# Patient Record
Sex: MALE | Race: WHITE | NOT HISPANIC OR LATINO | Employment: OTHER | URBAN - NONMETROPOLITAN AREA
[De-identification: names, ages, dates, MRNs, and addresses within clinical notes are randomized per-mention and may not be internally consistent; named-entity substitution may affect disease eponyms.]

---

## 2018-07-15 ENCOUNTER — HOSPITAL ENCOUNTER (EMERGENCY)
Facility: HOSPITAL | Age: 43
Discharge: HOME | End: 2018-07-15
Attending: STUDENT IN AN ORGANIZED HEALTH CARE EDUCATION/TRAINING PROGRAM | Admitting: STUDENT IN AN ORGANIZED HEALTH CARE EDUCATION/TRAINING PROGRAM

## 2018-07-15 VITALS
SYSTOLIC BLOOD PRESSURE: 122 MMHG | OXYGEN SATURATION: 100 % | RESPIRATION RATE: 18 BRPM | DIASTOLIC BLOOD PRESSURE: 66 MMHG | WEIGHT: 160.8 LBS | BODY MASS INDEX: 25.84 KG/M2 | TEMPERATURE: 98.7 F | HEIGHT: 66 IN | HEART RATE: 66 BPM

## 2018-07-15 DIAGNOSIS — M70.22 OLECRANON BURSITIS OF LEFT ELBOW: Primary | ICD-10-CM

## 2018-07-15 PROCEDURE — 99283 EMERGENCY DEPT VISIT LOW MDM: CPT

## 2018-07-15 RX ORDER — MELOXICAM 15 MG/1
15 TABLET ORAL DAILY
Qty: 20 TABLET | Refills: 0 | Status: SHIPPED | OUTPATIENT
Start: 2018-07-15 | End: 2019-10-19 | Stop reason: HOSPADM

## 2018-07-15 RX ORDER — CLINDAMYCIN HYDROCHLORIDE 300 MG/1
300 CAPSULE ORAL 4 TIMES DAILY
Qty: 40 CAPSULE | Refills: 0 | Status: SHIPPED | OUTPATIENT
Start: 2018-07-15 | End: 2019-10-19 | Stop reason: SDUPTHER

## 2018-07-15 RX ORDER — MELOXICAM 7.5 MG/1
15 TABLET ORAL DAILY
Status: DISCONTINUED | OUTPATIENT
Start: 2018-07-15 | End: 2018-07-15

## 2018-07-15 RX ORDER — MELOXICAM 7.5 MG/1
15 TABLET ORAL DAILY
Status: DISCONTINUED | OUTPATIENT
Start: 2018-07-15 | End: 2018-07-15 | Stop reason: HOSPADM

## 2018-07-15 RX ORDER — CLINDAMYCIN HYDROCHLORIDE 150 MG/1
600 CAPSULE ORAL ONCE
Status: COMPLETED | OUTPATIENT
Start: 2018-07-15 | End: 2018-07-15

## 2018-07-15 RX ADMIN — CLINDAMYCIN HYDROCHLORIDE 600 MG: 150 CAPSULE ORAL at 03:47

## 2018-07-15 RX ADMIN — MELOXICAM 15 MG: 7.5 TABLET ORAL at 03:47

## 2018-07-15 NOTE — ED PROVIDER NOTES
Subjective   42-year-old male that presents with 2 days of progressively worsening left elbow pain.  States it has redness and swelling since yesterday morning.  He is concerned it was bitten by some form of bug.  Pain is worse with straightening the arm.  Pain is severe, constant, aching.            Review of Systems   All other systems reviewed and are negative.      Past Medical History:   Diagnosis Date   • Lung disease        Allergies   Allergen Reactions   • Ultram [Tramadol Hcl] Hives       Past Surgical History:   Procedure Laterality Date   • LEG SURGERY      broken bone   • NECK SURGERY         History reviewed. No pertinent family history.    Social History     Social History   • Marital status: Single     Social History Main Topics   • Smoking status: Current Every Day Smoker     Packs/day: 0.50     Types: Cigarettes   • Alcohol use No   • Drug use: Yes      Comment: heroin   • Sexual activity: Defer     Other Topics Concern   • Not on file           Objective   Physical Exam   Nursing note and vitals reviewed.    GEN: No acute distress  Head: Normocephalic, atraumatic  Eyes: Pupils equal round reactive to light  ENT: Posterior pharynx normal in appearance, oral mucosa is moist  Chest: Nontender to palpation  Cardiovascular: Regular rate  Lungs: Clear to auscultation bilaterally  Abdomen: Soft, nontender, nondistended, no peritoneal signs  Extremities: Left elbow over the olecranon does seem to be inflamed.  Mildly tender to palpation.  Appearance is consistent with an olecranon bursitis   Neuro: GCS 15  Psych: Mood and affect are appropriate    Procedures           ED Course                  MDM  Number of Diagnoses or Management Options  Olecranon bursitis of left elbow:   Diagnosis management comments: Unsure this is a traumatic bursitis versus inflammatory bursitis versus infectious bursitis.  We will treat with antibiotics.  Given the patient instructions to follow-up with orthopedics in the next  few days for further evaluation.        Final diagnoses:   Olecranon bursitis of left elbow            Shashi Moreau MD  07/16/18 [id number]

## 2019-10-19 ENCOUNTER — HOSPITAL ENCOUNTER (EMERGENCY)
Facility: HOSPITAL | Age: 44
Discharge: HOME | End: 2019-10-19
Attending: STUDENT IN AN ORGANIZED HEALTH CARE EDUCATION/TRAINING PROGRAM | Admitting: STUDENT IN AN ORGANIZED HEALTH CARE EDUCATION/TRAINING PROGRAM

## 2019-10-19 VITALS
HEART RATE: 66 BPM | OXYGEN SATURATION: 96 % | RESPIRATION RATE: 16 BRPM | TEMPERATURE: 97.9 F | WEIGHT: 158.2 LBS | DIASTOLIC BLOOD PRESSURE: 68 MMHG | SYSTOLIC BLOOD PRESSURE: 116 MMHG | BODY MASS INDEX: 24.83 KG/M2 | HEIGHT: 67 IN

## 2019-10-19 DIAGNOSIS — L98.9 SKIN LESIONS: Primary | ICD-10-CM

## 2019-10-19 DIAGNOSIS — F19.10 IV DRUG ABUSE (HCC): ICD-10-CM

## 2019-10-19 PROCEDURE — 99282 EMERGENCY DEPT VISIT SF MDM: CPT

## 2019-10-19 RX ORDER — ALBUTEROL SULFATE 90 UG/1
2 AEROSOL, METERED RESPIRATORY (INHALATION) EVERY 4 HOURS PRN
COMMUNITY

## 2019-10-19 RX ORDER — CLINDAMYCIN HYDROCHLORIDE 300 MG/1
300 CAPSULE ORAL 4 TIMES DAILY
Qty: 40 CAPSULE | Refills: 0 | Status: SHIPPED | OUTPATIENT
Start: 2019-10-19 | End: 2019-11-12 | Stop reason: HOSPADM

## 2019-10-20 NOTE — DISCHARGE INSTRUCTIONS
Use the chlorhexidine wash as directed.  Please take antibiotics as directed.  Follow-up with your doctor in 1 week if not improving.

## 2019-10-20 NOTE — ED PROVIDER NOTES
Subjective   44-year-old male that presents to the hospital with complaints of sores all over his body.  Patient does admit to IV drug abuse.  States he does not have currently any abscesses but he has multiple scabs that are painful.  Patient states pain is severe, constant, aching and worse with touch.            Review of Systems   All other systems reviewed and are negative.      Past Medical History:   Diagnosis Date   • Lung disease        Allergies   Allergen Reactions   • Ultram [Tramadol Hcl] Hives       Past Surgical History:   Procedure Laterality Date   • LEG SURGERY      broken bone   • NECK SURGERY         History reviewed. No pertinent family history.    Social History     Socioeconomic History   • Marital status: Single     Spouse name: Not on file   • Number of children: Not on file   • Years of education: Not on file   • Highest education level: Not on file   Tobacco Use   • Smoking status: Current Every Day Smoker     Packs/day: 0.50     Types: Cigarettes   • Smokeless tobacco: Current User   Substance and Sexual Activity   • Alcohol use: No   • Drug use: Yes     Comment: heroin   • Sexual activity: Defer           Objective   Physical Exam   Nursing note and vitals reviewed.      GEN: No acute distress  Skin: Patient has multiple scabbed areas on upper extremities and lower extremities as well as some on his torso.  There are no abscesses or areas of fluctuance.  None of them are draining currently.  Head: Normocephalic, atraumatic  Eyes: Pupils equal round reactive to light  ENT: Posterior pharynx normal in appearance, oral mucosa is moist  Chest: Nontender to palpation  Cardiovascular: Regular rate  Lungs: Clear to auscultation bilaterally  Abdomen: Soft, nontender, nondistended, no peritoneal signs  Extremities: No edema, normal appearance  Neuro: GCS 15  Psych: Mood and affect are appropriate      Procedures           ED Course                  MDM  Number of Diagnoses or Management  Options  IV drug abuse (CMS/[facility]):   Skin lesions:   Diagnosis management comments: Took chlorhexidine to the patient to give my usual chlorhexidine bath instructions that he was asleep.  He had to wake the patient up to give him a medication.  Will prescribe clindamycin as he has tolerated this in the past.  Did explain to him that part of this was related to his IV drug abuse.       Amount and/or Complexity of Data Reviewed  Decide to obtain previous medical records or to obtain history from someone other than the patient: yes  Obtain history from someone other than the patient: yes  Review and summarize past medical records: yes        Final diagnoses:   Skin lesions   IV drug abuse (CMS/[facility])              Shashi Moreau MD  10/19/19 [id number]

## 2019-10-30 ENCOUNTER — APPOINTMENT (OUTPATIENT)
Dept: CT IMAGING | Facility: HOSPITAL | Age: 44
End: 2019-10-30

## 2019-10-30 ENCOUNTER — HOSPITAL ENCOUNTER (INPATIENT)
Facility: HOSPITAL | Age: 44
LOS: 13 days | Discharge: HOME | End: 2019-11-12
Attending: EMERGENCY MEDICINE | Admitting: INTERNAL MEDICINE

## 2019-10-30 ENCOUNTER — APPOINTMENT (OUTPATIENT)
Dept: GENERAL RADIOLOGY | Facility: HOSPITAL | Age: 44
End: 2019-10-30

## 2019-10-30 DIAGNOSIS — R79.89 ELEVATED PROCALCITONIN: ICD-10-CM

## 2019-10-30 DIAGNOSIS — A41.9 SEPSIS, DUE TO UNSPECIFIED ORGANISM, UNSPECIFIED WHETHER ACUTE ORGAN DYSFUNCTION PRESENT (HCC): ICD-10-CM

## 2019-10-30 DIAGNOSIS — Z78.9 IMPAIRED MOBILITY AND ADLS: ICD-10-CM

## 2019-10-30 DIAGNOSIS — T50.904A POLYSUBSTANCE OVERDOSE, UNDETERMINED INTENT, INITIAL ENCOUNTER: ICD-10-CM

## 2019-10-30 DIAGNOSIS — J96.01 ACUTE RESPIRATORY FAILURE WITH HYPOXIA AND HYPERCAPNIA (HCC): Primary | ICD-10-CM

## 2019-10-30 DIAGNOSIS — J96.02 ACUTE RESPIRATORY FAILURE WITH HYPOXIA AND HYPERCAPNIA (HCC): Primary | ICD-10-CM

## 2019-10-30 DIAGNOSIS — Z74.09 IMPAIRED MOBILITY AND ADLS: ICD-10-CM

## 2019-10-30 DIAGNOSIS — J44.9 CHRONIC OBSTRUCTIVE PULMONARY DISEASE, UNSPECIFIED COPD TYPE (HCC): ICD-10-CM

## 2019-10-30 DIAGNOSIS — J69.0 ASPIRATION PNEUMONIA, UNSPECIFIED ASPIRATION PNEUMONIA TYPE, UNSPECIFIED LATERALITY, UNSPECIFIED PART OF LUNG (HCC): ICD-10-CM

## 2019-10-30 PROBLEM — T50.901A ACCIDENTAL DRUG OVERDOSE: Status: ACTIVE | Noted: 2019-10-30

## 2019-10-30 PROBLEM — F17.200 TOBACCO DEPENDENCE: Status: ACTIVE | Noted: 2019-10-30

## 2019-10-30 PROBLEM — F19.10 DRUG ABUSE (HCC): Status: ACTIVE | Noted: 2019-10-30

## 2019-10-30 PROBLEM — R73.9 HYPERGLYCEMIA: Status: ACTIVE | Noted: 2019-10-30

## 2019-10-30 PROBLEM — G92.9 TOXIC ENCEPHALOPATHY: Status: ACTIVE | Noted: 2019-10-30

## 2019-10-30 LAB
A-A DO2: 443.1 MMHG
ALBUMIN SERPL-MCNC: 3.6 G/DL (ref 3.5–5.2)
ALBUMIN/GLOB SERPL: 0.7 G/DL
ALP SERPL-CCNC: 140 U/L (ref 39–117)
ALT SERPL W P-5'-P-CCNC: 39 U/L (ref 1–41)
AMPHET+METHAMPHET UR QL: POSITIVE
AMPHETAMINES UR QL: POSITIVE
ANION GAP SERPL CALCULATED.3IONS-SCNC: 21.2 MMOL/L (ref 5–15)
ARTERIAL PATENCY WRIST A: POSITIVE
AST SERPL-CCNC: 50 U/L (ref 1–40)
ATMOSPHERIC PRESS: 731 MMHG
BACTERIA UR QL AUTO: ABNORMAL /HPF
BARBITURATES UR QL SCN: NEGATIVE
BASE EXCESS BLDA CALC-SCNC: -4.9 MMOL/L (ref 0–2)
BDY SITE: ABNORMAL
BENZODIAZ UR QL SCN: NEGATIVE
BILIRUB SERPL-MCNC: 0.2 MG/DL (ref 0.2–1.2)
BILIRUB UR QL STRIP: NEGATIVE
BUN BLD-MCNC: 18 MG/DL (ref 6–20)
BUN/CREAT SERPL: 16.5 (ref 7–25)
BUPRENORPHINE SERPL-MCNC: NEGATIVE NG/ML
CALCIUM SPEC-SCNC: 9.3 MG/DL (ref 8.6–10.5)
CANNABINOIDS SERPL QL: NEGATIVE
CHLORIDE SERPL-SCNC: 99 MMOL/L (ref 98–107)
CLARITY UR: CLEAR
CO2 SERPL-SCNC: 18.8 MMOL/L (ref 22–29)
COCAINE UR QL: NEGATIVE
COHGB MFR BLD: 1.4 % (ref 0–2)
COLOR UR: YELLOW
CREAT BLD-MCNC: 1.09 MG/DL (ref 0.76–1.27)
D-LACTATE SERPL-SCNC: 6.6 MMOL/L (ref 0.5–2)
DEPRECATED RDW RBC AUTO: 45.8 FL (ref 37–54)
EOSINOPHIL # BLD MANUAL: 0.44 10*3/MM3 (ref 0–0.4)
EOSINOPHIL NFR BLD MANUAL: 2 % (ref 0.3–6.2)
ERYTHROCYTE [DISTWIDTH] IN BLOOD BY AUTOMATED COUNT: 14.5 % (ref 12.3–15.4)
ETHANOL BLD-MCNC: <10 MG/DL (ref 0–10)
ETHANOL UR QL: <0.01 %
GFR SERPL CREATININE-BSD FRML MDRD: 73 ML/MIN/1.73
GLOBULIN UR ELPH-MCNC: 5.1 GM/DL
GLUCOSE BLD-MCNC: 315 MG/DL (ref 65–99)
GLUCOSE UR STRIP-MCNC: ABNORMAL MG/DL
HBA1C MFR BLD: 6 % (ref 4.8–5.6)
HCO3 BLDA-SCNC: 25.6 MMOL/L (ref 22–28)
HCT VFR BLD AUTO: 42.3 % (ref 37.5–51)
HCT VFR BLD CALC: 39.5 %
HGB BLD-MCNC: 12.3 G/DL (ref 13–17.7)
HGB BLDA-MCNC: 12.9 G/DL (ref 12–18)
HGB UR QL STRIP.AUTO: ABNORMAL
HOROWITZ INDEX BLD+IHG-RTO: 100 %
HYALINE CASTS UR QL AUTO: ABNORMAL /LPF
KETONES UR QL STRIP: NEGATIVE
LEUKOCYTE ESTERASE UR QL STRIP.AUTO: NEGATIVE
LYMPHOCYTES # BLD MANUAL: 12.21 10*3/MM3 (ref 0.7–3.1)
LYMPHOCYTES NFR BLD MANUAL: 2 % (ref 5–12)
LYMPHOCYTES NFR BLD MANUAL: 56 % (ref 19.6–45.3)
MCH RBC QN AUTO: 25.1 PG (ref 26.6–33)
MCHC RBC AUTO-ENTMCNC: 29.1 G/DL (ref 31.5–35.7)
MCV RBC AUTO: 86.2 FL (ref 79–97)
METHADONE UR QL SCN: NEGATIVE
METHGB BLD QL: 1.2 % (ref 0–1.5)
MODALITY: ABNORMAL
MONOCYTES # BLD AUTO: 0.44 10*3/MM3 (ref 0.1–0.9)
MUCOUS THREADS URNS QL MICRO: ABNORMAL /HPF
NEUTROPHILS # BLD AUTO: 8.5 10*3/MM3 (ref 1.7–7)
NEUTROPHILS NFR BLD MANUAL: 35 % (ref 42.7–76)
NEUTS BAND NFR BLD MANUAL: 4 % (ref 0–5)
NITRITE UR QL STRIP: NEGATIVE
NOTE: ABNORMAL
NT-PROBNP SERPL-MCNC: 385.2 PG/ML (ref 5–450)
OPIATES UR QL: POSITIVE
OXYCODONE UR QL SCN: NEGATIVE
OXYHGB MFR BLDV: 95.7 % (ref 94–99)
PCO2 BLDA: 75.2 MM HG (ref 35–45)
PCO2 TEMP ADJ BLD: ABNORMAL MM[HG]
PCP UR QL SCN: NEGATIVE
PEEP RESPIRATORY: 5 CM[H2O]
PH BLDA: 7.14 PH UNITS (ref 7.3–7.5)
PH UR STRIP.AUTO: 6 [PH] (ref 5–8)
PH, TEMP CORRECTED: ABNORMAL
PLATELET # BLD AUTO: 460 10*3/MM3 (ref 140–450)
PMV BLD AUTO: 9.3 FL (ref 6–12)
PO2 BLDA: 166 MM HG (ref 75–100)
PO2 TEMP ADJ BLD: ABNORMAL MM[HG]
POTASSIUM BLD-SCNC: 3.9 MMOL/L (ref 3.5–5.2)
PROCALCITONIN SERPL-MCNC: 8.3 NG/ML (ref 0.1–0.25)
PROPOXYPH UR QL: NEGATIVE
PROT SERPL-MCNC: 8.7 G/DL (ref 6–8.5)
PROT UR QL STRIP: ABNORMAL
RBC # BLD AUTO: 4.91 10*6/MM3 (ref 4.14–5.8)
RBC # UR: ABNORMAL /HPF
RBC MORPH BLD: NORMAL
REF LAB TEST METHOD: ABNORMAL
SALICYLATES SERPL-MCNC: <0.3 MG/DL
SAO2 % BLDCOA: 98.3 % (ref 94–100)
SCAN SLIDE: NORMAL
SET MECH RESP RATE: 16
SMALL PLATELETS BLD QL SMEAR: ADEQUATE
SODIUM BLD-SCNC: 139 MMOL/L (ref 136–145)
SP GR UR STRIP: 1.02 (ref 1–1.03)
SPERM URNS QL MICRO: ABNORMAL /HPF
SQUAMOUS #/AREA URNS HPF: ABNORMAL /HPF
TRANS CELLS #/AREA URNS HPF: ABNORMAL /HPF
TRICYCLICS UR QL SCN: NEGATIVE
TROPONIN T SERPL-MCNC: <0.01 NG/ML (ref 0–0.03)
UROBILINOGEN UR QL STRIP: ABNORMAL
VARIANT LYMPHS NFR BLD MANUAL: 1 % (ref 0–5)
VENTILATOR MODE: AC
VT ON VENT VENT: 600 ML
WBC MORPH BLD: NORMAL
WBC NRBC COR # BLD: 21.8 10*3/MM3 (ref 3.4–10.8)
WBC UR QL AUTO: ABNORMAL /HPF

## 2019-10-30 PROCEDURE — 0BH17EZ INSERTION OF ENDOTRACHEAL AIRWAY INTO TRACHEA, VIA NATURAL OR ARTIFICIAL OPENING: ICD-10-PCS | Performed by: INTERNAL MEDICINE

## 2019-10-30 PROCEDURE — 82375 ASSAY CARBOXYHB QUANT: CPT

## 2019-10-30 PROCEDURE — 25010000002 PROPOFOL 10 MG/ML EMULSION: Performed by: EMERGENCY MEDICINE

## 2019-10-30 PROCEDURE — 87040 BLOOD CULTURE FOR BACTERIA: CPT | Performed by: EMERGENCY MEDICINE

## 2019-10-30 PROCEDURE — 94799 UNLISTED PULMONARY SVC/PX: CPT

## 2019-10-30 PROCEDURE — 93005 ELECTROCARDIOGRAM TRACING: CPT | Performed by: EMERGENCY MEDICINE

## 2019-10-30 PROCEDURE — 25010000002 VANCOMYCIN 5 G RECONSTITUTED SOLUTION 5,000 MG VIAL: Performed by: EMERGENCY MEDICINE

## 2019-10-30 PROCEDURE — 99223 1ST HOSP IP/OBS HIGH 75: CPT | Performed by: INTERNAL MEDICINE

## 2019-10-30 PROCEDURE — 71250 CT THORAX DX C-: CPT

## 2019-10-30 PROCEDURE — 80307 DRUG TEST PRSMV CHEM ANLYZR: CPT | Performed by: EMERGENCY MEDICINE

## 2019-10-30 PROCEDURE — 82805 BLOOD GASES W/O2 SATURATION: CPT

## 2019-10-30 PROCEDURE — 25010000002 MIDAZOLAM PER 1MG: Performed by: EMERGENCY MEDICINE

## 2019-10-30 PROCEDURE — 71045 X-RAY EXAM CHEST 1 VIEW: CPT

## 2019-10-30 PROCEDURE — 85025 COMPLETE CBC W/AUTO DIFF WBC: CPT | Performed by: EMERGENCY MEDICINE

## 2019-10-30 PROCEDURE — 85007 BL SMEAR W/DIFF WBC COUNT: CPT | Performed by: EMERGENCY MEDICINE

## 2019-10-30 PROCEDURE — 80053 COMPREHEN METABOLIC PANEL: CPT | Performed by: EMERGENCY MEDICINE

## 2019-10-30 PROCEDURE — 31500 INSERT EMERGENCY AIRWAY: CPT

## 2019-10-30 PROCEDURE — 5A1955Z RESPIRATORY VENTILATION, GREATER THAN 96 CONSECUTIVE HOURS: ICD-10-PCS | Performed by: INTERNAL MEDICINE

## 2019-10-30 PROCEDURE — 70450 CT HEAD/BRAIN W/O DYE: CPT

## 2019-10-30 PROCEDURE — 36600 WITHDRAWAL OF ARTERIAL BLOOD: CPT

## 2019-10-30 PROCEDURE — 83605 ASSAY OF LACTIC ACID: CPT | Performed by: EMERGENCY MEDICINE

## 2019-10-30 PROCEDURE — 84145 PROCALCITONIN (PCT): CPT | Performed by: EMERGENCY MEDICINE

## 2019-10-30 PROCEDURE — 83050 HGB METHEMOGLOBIN QUAN: CPT

## 2019-10-30 PROCEDURE — 25010000002 MORPHINE PER 10 MG: Performed by: INTERNAL MEDICINE

## 2019-10-30 PROCEDURE — 83880 ASSAY OF NATRIURETIC PEPTIDE: CPT | Performed by: EMERGENCY MEDICINE

## 2019-10-30 PROCEDURE — 25010000002 ENOXAPARIN PER 10 MG: Performed by: INTERNAL MEDICINE

## 2019-10-30 PROCEDURE — 82962 GLUCOSE BLOOD TEST: CPT

## 2019-10-30 PROCEDURE — 25010000002 PROPOFOL 1000 MG/100ML EMULSION

## 2019-10-30 PROCEDURE — 83036 HEMOGLOBIN GLYCOSYLATED A1C: CPT | Performed by: INTERNAL MEDICINE

## 2019-10-30 PROCEDURE — 81001 URINALYSIS AUTO W/SCOPE: CPT | Performed by: EMERGENCY MEDICINE

## 2019-10-30 PROCEDURE — 94002 VENT MGMT INPAT INIT DAY: CPT

## 2019-10-30 PROCEDURE — 99285 EMERGENCY DEPT VISIT HI MDM: CPT

## 2019-10-30 PROCEDURE — 25010000002 PIPERACILLIN SOD-TAZOBACTAM PER 1 G: Performed by: EMERGENCY MEDICINE

## 2019-10-30 PROCEDURE — 84484 ASSAY OF TROPONIN QUANT: CPT | Performed by: EMERGENCY MEDICINE

## 2019-10-30 RX ORDER — ETOMIDATE 2 MG/ML
20 INJECTION INTRAVENOUS ONCE
Status: COMPLETED | OUTPATIENT
Start: 2019-10-30 | End: 2019-10-30

## 2019-10-30 RX ORDER — NALOXONE HYDROCHLORIDE 1 MG/ML
2 INJECTION INTRAMUSCULAR; INTRAVENOUS; SUBCUTANEOUS ONCE
Status: COMPLETED | OUTPATIENT
Start: 2019-10-30 | End: 2019-10-30

## 2019-10-30 RX ORDER — SODIUM CHLORIDE 0.9 % (FLUSH) 0.9 %
10 SYRINGE (ML) INJECTION AS NEEDED
Status: DISCONTINUED | OUTPATIENT
Start: 2019-10-30 | End: 2019-11-12 | Stop reason: HOSPADM

## 2019-10-30 RX ORDER — ONDANSETRON 2 MG/ML
4 INJECTION INTRAMUSCULAR; INTRAVENOUS EVERY 6 HOURS PRN
Status: DISCONTINUED | OUTPATIENT
Start: 2019-10-30 | End: 2019-11-12 | Stop reason: HOSPADM

## 2019-10-30 RX ORDER — ACETAMINOPHEN 650 MG/1
650 SUPPOSITORY RECTAL EVERY 4 HOURS PRN
Status: DISCONTINUED | OUTPATIENT
Start: 2019-10-30 | End: 2019-11-12 | Stop reason: HOSPADM

## 2019-10-30 RX ORDER — MULTIVIT WITH IRON,MINERALS
15 LIQUID (ML) ORAL DAILY
Status: DISCONTINUED | OUTPATIENT
Start: 2019-10-31 | End: 2019-11-11

## 2019-10-30 RX ORDER — DEXTROSE MONOHYDRATE 25 G/50ML
25 INJECTION, SOLUTION INTRAVENOUS
Status: DISCONTINUED | OUTPATIENT
Start: 2019-10-30 | End: 2019-11-11

## 2019-10-30 RX ORDER — ACETAMINOPHEN 160 MG/5ML
650 SOLUTION ORAL EVERY 4 HOURS PRN
Status: DISCONTINUED | OUTPATIENT
Start: 2019-10-30 | End: 2019-11-12 | Stop reason: HOSPADM

## 2019-10-30 RX ORDER — SODIUM CHLORIDE 9 MG/ML
50 INJECTION, SOLUTION INTRAVENOUS CONTINUOUS
Status: DISCONTINUED | OUTPATIENT
Start: 2019-10-30 | End: 2019-11-03

## 2019-10-30 RX ORDER — NICOTINE POLACRILEX 4 MG
1 LOZENGE BUCCAL
Status: DISCONTINUED | OUTPATIENT
Start: 2019-10-30 | End: 2019-11-11

## 2019-10-30 RX ORDER — LABETALOL HYDROCHLORIDE 5 MG/ML
10 INJECTION, SOLUTION INTRAVENOUS EVERY 4 HOURS PRN
Status: DISCONTINUED | OUTPATIENT
Start: 2019-10-30 | End: 2019-11-12 | Stop reason: HOSPADM

## 2019-10-30 RX ORDER — MIDAZOLAM HYDROCHLORIDE 5 MG/ML
INJECTION INTRAMUSCULAR; INTRAVENOUS
Status: DISPENSED
Start: 2019-10-30 | End: 2019-10-31

## 2019-10-30 RX ORDER — L.ACID,PARA/B.BIFIDUM/S.THERM 8B CELL
1 CAPSULE ORAL 2 TIMES DAILY
Status: DISCONTINUED | OUTPATIENT
Start: 2019-10-30 | End: 2019-11-12 | Stop reason: HOSPADM

## 2019-10-30 RX ORDER — CHLORHEXIDINE GLUCONATE 0.12 MG/ML
15 RINSE ORAL EVERY 12 HOURS SCHEDULED
Status: DISCONTINUED | OUTPATIENT
Start: 2019-10-30 | End: 2019-11-07

## 2019-10-30 RX ORDER — MIDAZOLAM HYDROCHLORIDE 2 MG/2ML
2 INJECTION, SOLUTION INTRAMUSCULAR; INTRAVENOUS ONCE
Status: COMPLETED | OUTPATIENT
Start: 2019-10-30 | End: 2019-10-30

## 2019-10-30 RX ORDER — ROCURONIUM BROMIDE 10 MG/ML
100 INJECTION, SOLUTION INTRAVENOUS ONCE
Status: COMPLETED | OUTPATIENT
Start: 2019-10-30 | End: 2019-10-30

## 2019-10-30 RX ORDER — ACETAMINOPHEN 325 MG/1
650 TABLET ORAL EVERY 4 HOURS PRN
Status: DISCONTINUED | OUTPATIENT
Start: 2019-10-30 | End: 2019-11-12 | Stop reason: HOSPADM

## 2019-10-30 RX ORDER — SODIUM CHLORIDE 0.9 % (FLUSH) 0.9 %
10 SYRINGE (ML) INJECTION EVERY 12 HOURS SCHEDULED
Status: DISCONTINUED | OUTPATIENT
Start: 2019-10-30 | End: 2019-11-12 | Stop reason: HOSPADM

## 2019-10-30 RX ORDER — MORPHINE SULFATE 4 MG/ML
4 INJECTION, SOLUTION INTRAMUSCULAR; INTRAVENOUS
Status: DISCONTINUED | OUTPATIENT
Start: 2019-10-30 | End: 2019-11-07 | Stop reason: SDUPTHER

## 2019-10-30 RX ORDER — FAMOTIDINE 10 MG/ML
20 INJECTION, SOLUTION INTRAVENOUS EVERY 12 HOURS SCHEDULED
Status: DISCONTINUED | OUTPATIENT
Start: 2019-10-30 | End: 2019-11-07

## 2019-10-30 RX ORDER — PROPOFOL 10 MG/ML
INJECTION, EMULSION INTRAVENOUS
Status: COMPLETED
Start: 2019-10-30 | End: 2019-10-30

## 2019-10-30 RX ADMIN — TAZOBACTAM SODIUM AND PIPERACILLIN SODIUM 3.38 G: 375; 3 INJECTION, SOLUTION INTRAVENOUS at 21:48

## 2019-10-30 RX ADMIN — VANCOMYCIN HYDROCHLORIDE 1750 MG: 500 INJECTION, POWDER, LYOPHILIZED, FOR SOLUTION INTRAVENOUS at 22:26

## 2019-10-30 RX ADMIN — ENOXAPARIN SODIUM 40 MG: 40 INJECTION SUBCUTANEOUS at 23:58

## 2019-10-30 RX ADMIN — ROCURONIUM BROMIDE 100 MG: 10 INJECTION, SOLUTION INTRAVENOUS at 19:45

## 2019-10-30 RX ADMIN — SODIUM CHLORIDE 150 ML/HR: 9 INJECTION, SOLUTION INTRAVENOUS at 23:38

## 2019-10-30 RX ADMIN — Medication 1 CAPSULE: at 23:58

## 2019-10-30 RX ADMIN — SODIUM CHLORIDE 1000 ML: 9 INJECTION, SOLUTION INTRAVENOUS at 20:13

## 2019-10-30 RX ADMIN — MIDAZOLAM 4 MG/HR: 5 INJECTION INTRAMUSCULAR; INTRAVENOUS at 23:54

## 2019-10-30 RX ADMIN — ETOMIDATE 20 MG: 2 INJECTION, SOLUTION INTRAVENOUS at 19:40

## 2019-10-30 RX ADMIN — NALOXONE HYDROCHLORIDE 2 MG: 1 INJECTION PARENTERAL at 19:39

## 2019-10-30 RX ADMIN — MIDAZOLAM HYDROCHLORIDE 2 MG: 1 INJECTION, SOLUTION INTRAMUSCULAR; INTRAVENOUS at 22:37

## 2019-10-30 RX ADMIN — MIDAZOLAM HYDROCHLORIDE 2 MG: 1 INJECTION, SOLUTION INTRAMUSCULAR; INTRAVENOUS at 19:51

## 2019-10-30 RX ADMIN — PROPOFOL 5 MCG/KG/MIN: 10 INJECTION, EMULSION INTRAVENOUS at 20:04

## 2019-10-30 RX ADMIN — CHLORHEXIDINE GLUCONATE 0.12% ORAL RINSE 15 ML: 1.2 LIQUID ORAL at 23:58

## 2019-10-30 RX ADMIN — SODIUM CHLORIDE 1000 ML: 9 INJECTION, SOLUTION INTRAVENOUS at 21:54

## 2019-10-30 RX ADMIN — FAMOTIDINE 20 MG: 10 INJECTION, SOLUTION INTRAVENOUS at 23:58

## 2019-10-30 RX ADMIN — PROPOFOL 50 MCG/KG/MIN: 10 INJECTION, EMULSION INTRAVENOUS at 23:42

## 2019-10-30 RX ADMIN — MORPHINE SULFATE 4 MG: 4 INJECTION INTRAVENOUS at 23:25

## 2019-10-31 ENCOUNTER — APPOINTMENT (OUTPATIENT)
Dept: GENERAL RADIOLOGY | Facility: HOSPITAL | Age: 44
End: 2019-10-31

## 2019-10-31 LAB
A-A DO2: 446 MMHG
ALBUMIN SERPL-MCNC: 2.6 G/DL (ref 3.5–5.2)
ALBUMIN/GLOB SERPL: 0.7 G/DL
ALP SERPL-CCNC: 100 U/L (ref 39–117)
ALT SERPL W P-5'-P-CCNC: 32 U/L (ref 1–41)
ANION GAP SERPL CALCULATED.3IONS-SCNC: 8.7 MMOL/L (ref 5–15)
ARTERIAL PATENCY WRIST A: ABNORMAL
AST SERPL-CCNC: 38 U/L (ref 1–40)
ATMOSPHERIC PRESS: 728 MMHG
BASE EXCESS BLDA CALC-SCNC: -3.3 MMOL/L (ref 0–2)
BDY SITE: ABNORMAL
BILIRUB SERPL-MCNC: 0.4 MG/DL (ref 0.2–1.2)
BUN BLD-MCNC: 19 MG/DL (ref 6–20)
BUN/CREAT SERPL: 19.4 (ref 7–25)
CALCIUM SPEC-SCNC: 8 MG/DL (ref 8.6–10.5)
CHLORIDE SERPL-SCNC: 109 MMOL/L (ref 98–107)
CO2 SERPL-SCNC: 23.3 MMOL/L (ref 22–29)
COHGB MFR BLD: <0.7 % (ref 0–2)
CREAT BLD-MCNC: 0.98 MG/DL (ref 0.76–1.27)
D-LACTATE SERPL-SCNC: 1.7 MMOL/L (ref 0.5–2)
DEPRECATED RDW RBC AUTO: 44.4 FL (ref 37–54)
EOSINOPHIL # BLD MANUAL: 0.08 10*3/MM3 (ref 0–0.4)
EOSINOPHIL NFR BLD MANUAL: 3 % (ref 0.3–6.2)
ERYTHROCYTE [DISTWIDTH] IN BLOOD BY AUTOMATED COUNT: 14.6 % (ref 12.3–15.4)
GFR SERPL CREATININE-BSD FRML MDRD: 83 ML/MIN/1.73
GLOBULIN UR ELPH-MCNC: 3.9 GM/DL
GLUCOSE BLD-MCNC: 117 MG/DL (ref 65–99)
GLUCOSE BLDC GLUCOMTR-MCNC: 105 MG/DL (ref 70–130)
GLUCOSE BLDC GLUCOMTR-MCNC: 123 MG/DL (ref 70–130)
GLUCOSE BLDC GLUCOMTR-MCNC: 89 MG/DL (ref 70–130)
GLUCOSE BLDC GLUCOMTR-MCNC: 98 MG/DL (ref 70–130)
HCO3 BLDA-SCNC: 24.3 MMOL/L (ref 22–28)
HCT VFR BLD AUTO: 40.3 % (ref 37.5–51)
HCT VFR BLD CALC: 37.1 %
HGB BLD-MCNC: 12 G/DL (ref 13–17.7)
HGB BLDA-MCNC: 12.1 G/DL (ref 12–18)
HOLD SPECIMEN: NORMAL
HOROWITZ INDEX BLD+IHG-RTO: 90 %
LYMPHOCYTES # BLD MANUAL: 0.48 10*3/MM3 (ref 0.7–3.1)
LYMPHOCYTES NFR BLD MANUAL: 11 % (ref 5–12)
LYMPHOCYTES NFR BLD MANUAL: 18 % (ref 19.6–45.3)
MAGNESIUM SERPL-MCNC: 1.7 MG/DL (ref 1.6–2.6)
MCH RBC QN AUTO: 24.8 PG (ref 26.6–33)
MCHC RBC AUTO-ENTMCNC: 29.8 G/DL (ref 31.5–35.7)
MCV RBC AUTO: 83.3 FL (ref 79–97)
METAMYELOCYTES NFR BLD MANUAL: 2 % (ref 0–0)
METHGB BLD QL: 0.9 % (ref 0–1.5)
MODALITY: ABNORMAL
MONOCYTES # BLD AUTO: 0.29 10*3/MM3 (ref 0.1–0.9)
NEUTROPHILS # BLD AUTO: 1.74 10*3/MM3 (ref 1.7–7)
NEUTROPHILS NFR BLD MANUAL: 45 % (ref 42.7–76)
NEUTS BAND NFR BLD MANUAL: 21 % (ref 0–5)
NOTE: ABNORMAL
OXYHGB MFR BLDV: 96.8 % (ref 94–99)
PCO2 BLDA: 54.5 MM HG (ref 35–45)
PCO2 TEMP ADJ BLD: ABNORMAL MM[HG]
PH BLDA: 7.26 PH UNITS (ref 7.3–7.5)
PH, TEMP CORRECTED: ABNORMAL
PHOSPHATE SERPL-MCNC: 2.2 MG/DL (ref 2.5–4.5)
PLATELET # BLD AUTO: 267 10*3/MM3 (ref 140–450)
PMV BLD AUTO: 9.1 FL (ref 6–12)
PO2 BLDA: 111 MM HG (ref 75–100)
PO2 TEMP ADJ BLD: ABNORMAL MM[HG]
POTASSIUM BLD-SCNC: 3.6 MMOL/L (ref 3.5–5.2)
PROT SERPL-MCNC: 6.5 G/DL (ref 6–8.5)
RBC # BLD AUTO: 4.84 10*6/MM3 (ref 4.14–5.8)
RBC MORPH BLD: NORMAL
SAO2 % BLDCOA: 98.2 % (ref 94–100)
SCAN SLIDE: NORMAL
SMALL PLATELETS BLD QL SMEAR: ADEQUATE
SODIUM BLD-SCNC: 141 MMOL/L (ref 136–145)
TROPONIN T SERPL-MCNC: <0.01 NG/ML (ref 0–0.03)
TSH SERPL DL<=0.05 MIU/L-ACNC: 1.01 UIU/ML (ref 0.27–4.2)
VENTILATOR MODE: ABNORMAL
WBC MORPH BLD: NORMAL
WBC NRBC COR # BLD: 2.64 10*3/MM3 (ref 3.4–10.8)

## 2019-10-31 PROCEDURE — 83050 HGB METHEMOGLOBIN QUAN: CPT

## 2019-10-31 PROCEDURE — 94799 UNLISTED PULMONARY SVC/PX: CPT

## 2019-10-31 PROCEDURE — 05HN33Z INSERTION OF INFUSION DEVICE INTO LEFT INTERNAL JUGULAR VEIN, PERCUTANEOUS APPROACH: ICD-10-PCS | Performed by: INTERNAL MEDICINE

## 2019-10-31 PROCEDURE — 85025 COMPLETE CBC W/AUTO DIFF WBC: CPT | Performed by: INTERNAL MEDICINE

## 2019-10-31 PROCEDURE — 25010000002 PROPOFOL 10 MG/ML EMULSION: Performed by: EMERGENCY MEDICINE

## 2019-10-31 PROCEDURE — 25010000002 VANCOMYCIN 5 G RECONSTITUTED SOLUTION 5,000 MG VIAL: Performed by: INTERNAL MEDICINE

## 2019-10-31 PROCEDURE — 25010000002 PIPERACILLIN SOD-TAZOBACTAM PER 1 G: Performed by: INTERNAL MEDICINE

## 2019-10-31 PROCEDURE — 99255 IP/OBS CONSLTJ NEW/EST HI 80: CPT | Performed by: INTERNAL MEDICINE

## 2019-10-31 PROCEDURE — 80053 COMPREHEN METABOLIC PANEL: CPT | Performed by: INTERNAL MEDICINE

## 2019-10-31 PROCEDURE — 84484 ASSAY OF TROPONIN QUANT: CPT | Performed by: INTERNAL MEDICINE

## 2019-10-31 PROCEDURE — 83735 ASSAY OF MAGNESIUM: CPT | Performed by: INTERNAL MEDICINE

## 2019-10-31 PROCEDURE — 82375 ASSAY CARBOXYHB QUANT: CPT

## 2019-10-31 PROCEDURE — 71045 X-RAY EXAM CHEST 1 VIEW: CPT

## 2019-10-31 PROCEDURE — 94003 VENT MGMT INPAT SUBQ DAY: CPT

## 2019-10-31 PROCEDURE — 82962 GLUCOSE BLOOD TEST: CPT

## 2019-10-31 PROCEDURE — 25010000002 MIDAZOLAM 50 MG/10ML SOLUTION 10 ML VIAL: Performed by: INTERNAL MEDICINE

## 2019-10-31 PROCEDURE — 84100 ASSAY OF PHOSPHORUS: CPT | Performed by: INTERNAL MEDICINE

## 2019-10-31 PROCEDURE — 82805 BLOOD GASES W/O2 SATURATION: CPT

## 2019-10-31 PROCEDURE — 36556 INSERT NON-TUNNEL CV CATH: CPT | Performed by: INTERNAL MEDICINE

## 2019-10-31 PROCEDURE — 36600 WITHDRAWAL OF ARTERIAL BLOOD: CPT

## 2019-10-31 PROCEDURE — 87081 CULTURE SCREEN ONLY: CPT | Performed by: INTERNAL MEDICINE

## 2019-10-31 PROCEDURE — 84443 ASSAY THYROID STIM HORMONE: CPT | Performed by: INTERNAL MEDICINE

## 2019-10-31 PROCEDURE — 94770: CPT

## 2019-10-31 PROCEDURE — 99232 SBSQ HOSP IP/OBS MODERATE 35: CPT | Performed by: INTERNAL MEDICINE

## 2019-10-31 PROCEDURE — 76937 US GUIDE VASCULAR ACCESS: CPT | Performed by: INTERNAL MEDICINE

## 2019-10-31 PROCEDURE — 83605 ASSAY OF LACTIC ACID: CPT | Performed by: EMERGENCY MEDICINE

## 2019-10-31 RX ORDER — SODIUM CHLORIDE 0.9 % (FLUSH) 0.9 %
10 SYRINGE (ML) INJECTION EVERY 12 HOURS SCHEDULED
Status: DISCONTINUED | OUTPATIENT
Start: 2019-10-31 | End: 2019-11-10

## 2019-10-31 RX ORDER — SODIUM CHLORIDE 0.9 % (FLUSH) 0.9 %
20 SYRINGE (ML) INJECTION AS NEEDED
Status: DISCONTINUED | OUTPATIENT
Start: 2019-10-31 | End: 2019-11-10

## 2019-10-31 RX ORDER — SODIUM CHLORIDE 0.9 % (FLUSH) 0.9 %
10 SYRINGE (ML) INJECTION AS NEEDED
Status: DISCONTINUED | OUTPATIENT
Start: 2019-10-31 | End: 2019-11-10

## 2019-10-31 RX ORDER — PHENYLEPHRINE HCL IN 0.9% NACL 0.5 MG/5ML
.5-3 SYRINGE (ML) INTRAVENOUS
Status: DISCONTINUED | OUTPATIENT
Start: 2019-10-31 | End: 2019-11-02

## 2019-10-31 RX ADMIN — TAZOBACTAM SODIUM AND PIPERACILLIN SODIUM 3.38 G: 375; 3 INJECTION, SOLUTION INTRAVENOUS at 12:36

## 2019-10-31 RX ADMIN — SODIUM CHLORIDE, PRESERVATIVE FREE 10 ML: 5 INJECTION INTRAVENOUS at 09:58

## 2019-10-31 RX ADMIN — SODIUM CHLORIDE 150 ML/HR: 9 INJECTION, SOLUTION INTRAVENOUS at 10:09

## 2019-10-31 RX ADMIN — SODIUM CHLORIDE, PRESERVATIVE FREE 10 ML: 5 INJECTION INTRAVENOUS at 10:06

## 2019-10-31 RX ADMIN — SODIUM CHLORIDE, PRESERVATIVE FREE 10 ML: 5 INJECTION INTRAVENOUS at 20:57

## 2019-10-31 RX ADMIN — VANCOMYCIN HYDROCHLORIDE 1250 MG: 500 INJECTION, POWDER, LYOPHILIZED, FOR SOLUTION INTRAVENOUS at 21:50

## 2019-10-31 RX ADMIN — MULTIVITAMIN 15 ML: LIQUID ORAL at 10:26

## 2019-10-31 RX ADMIN — Medication 1 CAPSULE: at 10:26

## 2019-10-31 RX ADMIN — Medication 1 CAPSULE: at 20:57

## 2019-10-31 RX ADMIN — PROPOFOL 50 MCG/KG/MIN: 10 INJECTION, EMULSION INTRAVENOUS at 13:49

## 2019-10-31 RX ADMIN — Medication 0.5 MCG/KG/MIN: at 10:58

## 2019-10-31 RX ADMIN — PROPOFOL 50 MCG/KG/MIN: 10 INJECTION, EMULSION INTRAVENOUS at 06:40

## 2019-10-31 RX ADMIN — SODIUM CHLORIDE, PRESERVATIVE FREE 10 ML: 5 INJECTION INTRAVENOUS at 10:05

## 2019-10-31 RX ADMIN — Medication 2 MCG/KG/MIN: at 22:50

## 2019-10-31 RX ADMIN — SODIUM CHLORIDE 150 ML/HR: 9 INJECTION, SOLUTION INTRAVENOUS at 05:44

## 2019-10-31 RX ADMIN — SODIUM CHLORIDE 150 ML/HR: 9 INJECTION, SOLUTION INTRAVENOUS at 17:54

## 2019-10-31 RX ADMIN — FAMOTIDINE 20 MG: 10 INJECTION, SOLUTION INTRAVENOUS at 20:57

## 2019-10-31 RX ADMIN — VANCOMYCIN HYDROCHLORIDE 1250 MG: 500 INJECTION, POWDER, LYOPHILIZED, FOR SOLUTION INTRAVENOUS at 11:00

## 2019-10-31 RX ADMIN — TAZOBACTAM SODIUM AND PIPERACILLIN SODIUM 3.38 G: 375; 3 INJECTION, SOLUTION INTRAVENOUS at 20:56

## 2019-10-31 RX ADMIN — CHLORHEXIDINE GLUCONATE 0.12% ORAL RINSE 15 ML: 1.2 LIQUID ORAL at 20:57

## 2019-10-31 RX ADMIN — CHLORHEXIDINE GLUCONATE 0.12% ORAL RINSE 15 ML: 1.2 LIQUID ORAL at 10:25

## 2019-10-31 RX ADMIN — MIDAZOLAM 3 MG/HR: 5 INJECTION INTRAMUSCULAR; INTRAVENOUS at 10:12

## 2019-10-31 RX ADMIN — Medication 2 MCG/KG/MIN: at 16:43

## 2019-10-31 RX ADMIN — PROPOFOL 50 MCG/KG/MIN: 10 INJECTION, EMULSION INTRAVENOUS at 17:54

## 2019-10-31 RX ADMIN — FAMOTIDINE 20 MG: 10 INJECTION, SOLUTION INTRAVENOUS at 10:26

## 2019-10-31 RX ADMIN — TAZOBACTAM SODIUM AND PIPERACILLIN SODIUM 3.38 G: 375; 3 INJECTION, SOLUTION INTRAVENOUS at 04:42

## 2019-10-31 RX ADMIN — SODIUM CHLORIDE, PRESERVATIVE FREE 10 ML: 5 INJECTION INTRAVENOUS at 04:36

## 2019-10-31 RX ADMIN — PROPOFOL 50 MCG/KG/MIN: 10 INJECTION, EMULSION INTRAVENOUS at 09:44

## 2019-10-31 RX ADMIN — PROPOFOL 50 MCG/KG/MIN: 10 INJECTION, EMULSION INTRAVENOUS at 23:23

## 2019-10-31 RX ADMIN — PROPOFOL 50 MCG/KG/MIN: 10 INJECTION, EMULSION INTRAVENOUS at 02:05

## 2019-11-01 ENCOUNTER — APPOINTMENT (OUTPATIENT)
Dept: GENERAL RADIOLOGY | Facility: HOSPITAL | Age: 44
End: 2019-11-01

## 2019-11-01 LAB
A-A DO2: 200.2 MMHG
A-A DO2: 287.8 MMHG
AMORPH URATE CRY URNS QL MICRO: ABNORMAL /HPF
ANION GAP SERPL CALCULATED.3IONS-SCNC: 6.6 MMOL/L (ref 5–15)
ANION GAP SERPL CALCULATED.3IONS-SCNC: 6.6 MMOL/L (ref 5–15)
ANISOCYTOSIS BLD QL: ABNORMAL
ARTERIAL PATENCY WRIST A: ABNORMAL
ARTERIAL PATENCY WRIST A: POSITIVE
ATMOSPHERIC PRESS: 741 MMHG
ATMOSPHERIC PRESS: 742 MMHG
BACTERIA UR QL AUTO: ABNORMAL /HPF
BASE EXCESS BLDA CALC-SCNC: -5.8 MMOL/L (ref 0–2)
BASE EXCESS BLDA CALC-SCNC: -6.7 MMOL/L (ref 0–2)
BASOPHILS # BLD MANUAL: 0.16 10*3/MM3 (ref 0–0.2)
BASOPHILS NFR BLD AUTO: 1 % (ref 0–1.5)
BDY SITE: ABNORMAL
BDY SITE: ABNORMAL
BILIRUB UR QL STRIP: NEGATIVE
BUN BLD-MCNC: 30 MG/DL (ref 6–20)
BUN BLD-MCNC: 32 MG/DL (ref 6–20)
BUN/CREAT SERPL: 23.9 (ref 7–25)
BUN/CREAT SERPL: 26.8 (ref 7–25)
CALCIUM SPEC-SCNC: 7.7 MG/DL (ref 8.6–10.5)
CALCIUM SPEC-SCNC: 7.8 MG/DL (ref 8.6–10.5)
CHLORIDE SERPL-SCNC: 112 MMOL/L (ref 98–107)
CHLORIDE SERPL-SCNC: 113 MMOL/L (ref 98–107)
CLARITY UR: ABNORMAL
CO2 SERPL-SCNC: 21.4 MMOL/L (ref 22–29)
CO2 SERPL-SCNC: 22.4 MMOL/L (ref 22–29)
COHGB MFR BLD: <0.6 % (ref 0–2)
COHGB MFR BLD: <0.6 % (ref 0–2)
COLOR UR: YELLOW
CREAT BLD-MCNC: 1.12 MG/DL (ref 0.76–1.27)
CREAT BLD-MCNC: 1.34 MG/DL (ref 0.76–1.27)
DEPRECATED RDW RBC AUTO: 50.4 FL (ref 37–54)
EOSINOPHIL # BLD MANUAL: 0.33 10*3/MM3 (ref 0–0.4)
EOSINOPHIL NFR BLD MANUAL: 2 % (ref 0.3–6.2)
ERYTHROCYTE [DISTWIDTH] IN BLOOD BY AUTOMATED COUNT: 15.7 % (ref 12.3–15.4)
GFR SERPL CREATININE-BSD FRML MDRD: 58 ML/MIN/1.73
GFR SERPL CREATININE-BSD FRML MDRD: 71 ML/MIN/1.73
GLUCOSE BLD-MCNC: 101 MG/DL (ref 65–99)
GLUCOSE BLD-MCNC: 113 MG/DL (ref 65–99)
GLUCOSE BLDC GLUCOMTR-MCNC: 118 MG/DL (ref 70–130)
GLUCOSE BLDC GLUCOMTR-MCNC: 121 MG/DL (ref 70–130)
GLUCOSE BLDC GLUCOMTR-MCNC: 94 MG/DL (ref 70–130)
GLUCOSE BLDC GLUCOMTR-MCNC: 98 MG/DL (ref 70–130)
GLUCOSE UR STRIP-MCNC: NEGATIVE MG/DL
HCO3 BLDA-SCNC: 22.1 MMOL/L (ref 22–28)
HCO3 BLDA-SCNC: 22.7 MMOL/L (ref 22–28)
HCT VFR BLD AUTO: 37.5 % (ref 37.5–51)
HCT VFR BLD CALC: 35.3 %
HCT VFR BLD CALC: 35.5 %
HGB BLD-MCNC: 10.8 G/DL (ref 13–17.7)
HGB BLDA-MCNC: 11.5 G/DL (ref 12–18)
HGB BLDA-MCNC: 11.6 G/DL (ref 12–18)
HGB UR QL STRIP.AUTO: NEGATIVE
HOROWITZ INDEX BLD+IHG-RTO: 60 %
HOROWITZ INDEX BLD+IHG-RTO: 80 %
HYALINE CASTS UR QL AUTO: ABNORMAL /LPF
HYPOCHROMIA BLD QL: ABNORMAL
KETONES UR QL STRIP: NEGATIVE
LEUKOCYTE ESTERASE UR QL STRIP.AUTO: NEGATIVE
LYMPHOCYTES # BLD MANUAL: 1.96 10*3/MM3 (ref 0.7–3.1)
LYMPHOCYTES NFR BLD MANUAL: 12 % (ref 19.6–45.3)
LYMPHOCYTES NFR BLD MANUAL: 2 % (ref 5–12)
MCH RBC QN AUTO: 25.1 PG (ref 26.6–33)
MCHC RBC AUTO-ENTMCNC: 28.8 G/DL (ref 31.5–35.7)
MCV RBC AUTO: 87 FL (ref 79–97)
METAMYELOCYTES NFR BLD MANUAL: 1 % (ref 0–0)
METHGB BLD QL: 1.4 % (ref 0–1.5)
METHGB BLD QL: 1.6 % (ref 0–1.5)
MODALITY: ABNORMAL
MODALITY: ABNORMAL
MONOCYTES # BLD AUTO: 0.33 10*3/MM3 (ref 0.1–0.9)
MRSA SPEC QL CULT: NORMAL
MUCOUS THREADS URNS QL MICRO: ABNORMAL /HPF
NEUTROPHILS # BLD AUTO: 13.25 10*3/MM3 (ref 1.7–7)
NEUTROPHILS NFR BLD MANUAL: 44 % (ref 42.7–76)
NEUTS BAND NFR BLD MANUAL: 37 % (ref 0–5)
NITRITE UR QL STRIP: NEGATIVE
NOTE: ABNORMAL
NOTE: ABNORMAL
OXYHGB MFR BLDV: 97.6 % (ref 94–99)
OXYHGB MFR BLDV: 97.8 % (ref 94–99)
PCO2 BLDA: 53 MM HG (ref 35–45)
PCO2 BLDA: 64.4 MM HG (ref 35–45)
PCO2 TEMP ADJ BLD: ABNORMAL MM[HG]
PCO2 TEMP ADJ BLD: ABNORMAL MM[HG]
PEEP RESPIRATORY: 6 CM[H2O]
PEEP RESPIRATORY: 6 CM[H2O]
PH BLDA: 7.16 PH UNITS (ref 7.3–7.5)
PH BLDA: 7.23 PH UNITS (ref 7.3–7.5)
PH UR STRIP.AUTO: <=5 [PH] (ref 5–8)
PH, TEMP CORRECTED: ABNORMAL
PH, TEMP CORRECTED: ABNORMAL
PLATELET # BLD AUTO: 308 10*3/MM3 (ref 140–450)
PMV BLD AUTO: 9.9 FL (ref 6–12)
PO2 BLDA: 160 MM HG (ref 75–100)
PO2 BLDA: 201 MM HG (ref 75–100)
PO2 TEMP ADJ BLD: ABNORMAL MM[HG]
PO2 TEMP ADJ BLD: ABNORMAL MM[HG]
POTASSIUM BLD-SCNC: 4.4 MMOL/L (ref 3.5–5.2)
POTASSIUM BLD-SCNC: 5.6 MMOL/L (ref 3.5–5.2)
PROT UR QL STRIP: ABNORMAL
RBC # BLD AUTO: 4.31 10*6/MM3 (ref 4.14–5.8)
RBC # UR: ABNORMAL /HPF
REF LAB TEST METHOD: ABNORMAL
SAO2 % BLDCOA: 99.2 % (ref 94–100)
SAO2 % BLDCOA: 99.2 % (ref 94–100)
SCAN SLIDE: NORMAL
SET MECH RESP RATE: 20
SET MECH RESP RATE: 24
SMALL PLATELETS BLD QL SMEAR: ADEQUATE
SODIUM BLD-SCNC: 140 MMOL/L (ref 136–145)
SODIUM BLD-SCNC: 142 MMOL/L (ref 136–145)
SODIUM UR-SCNC: 25 MMOL/L
SP GR UR STRIP: 1.02 (ref 1–1.03)
SQUAMOUS #/AREA URNS HPF: ABNORMAL /HPF
URATE CRY URNS QL MICRO: ABNORMAL /HPF
UROBILINOGEN UR QL STRIP: ABNORMAL
VANCOMYCIN TROUGH SERPL-MCNC: 16 MCG/ML (ref 5–20)
VARIANT LYMPHS NFR BLD MANUAL: 1 % (ref 0–5)
VENTILATOR MODE: AC
VENTILATOR MODE: AC
VT ON VENT VENT: 550 ML
VT ON VENT VENT: 550 ML
WBC MORPH BLD: NORMAL
WBC NRBC COR # BLD: 16.36 10*3/MM3 (ref 3.4–10.8)
WBC UR QL AUTO: ABNORMAL /HPF

## 2019-11-01 PROCEDURE — 25010000002 VANCOMYCIN 5 G RECONSTITUTED SOLUTION 5,000 MG VIAL: Performed by: INTERNAL MEDICINE

## 2019-11-01 PROCEDURE — 25010000002 PROPOFOL 10 MG/ML EMULSION: Performed by: EMERGENCY MEDICINE

## 2019-11-01 PROCEDURE — 74018 RADEX ABDOMEN 1 VIEW: CPT

## 2019-11-01 PROCEDURE — 94003 VENT MGMT INPAT SUBQ DAY: CPT

## 2019-11-01 PROCEDURE — 94799 UNLISTED PULMONARY SVC/PX: CPT

## 2019-11-01 PROCEDURE — 82375 ASSAY CARBOXYHB QUANT: CPT

## 2019-11-01 PROCEDURE — 81001 URINALYSIS AUTO W/SCOPE: CPT | Performed by: INTERNAL MEDICINE

## 2019-11-01 PROCEDURE — 80202 ASSAY OF VANCOMYCIN: CPT | Performed by: INTERNAL MEDICINE

## 2019-11-01 PROCEDURE — 99291 CRITICAL CARE FIRST HOUR: CPT | Performed by: INTERNAL MEDICINE

## 2019-11-01 PROCEDURE — 80048 BASIC METABOLIC PNL TOTAL CA: CPT | Performed by: INTERNAL MEDICINE

## 2019-11-01 PROCEDURE — 82805 BLOOD GASES W/O2 SATURATION: CPT

## 2019-11-01 PROCEDURE — 36600 WITHDRAWAL OF ARTERIAL BLOOD: CPT

## 2019-11-01 PROCEDURE — 84300 ASSAY OF URINE SODIUM: CPT | Performed by: INTERNAL MEDICINE

## 2019-11-01 PROCEDURE — 99232 SBSQ HOSP IP/OBS MODERATE 35: CPT | Performed by: INTERNAL MEDICINE

## 2019-11-01 PROCEDURE — 82962 GLUCOSE BLOOD TEST: CPT

## 2019-11-01 PROCEDURE — 25010000002 ENOXAPARIN PER 10 MG: Performed by: INTERNAL MEDICINE

## 2019-11-01 PROCEDURE — 85025 COMPLETE CBC W/AUTO DIFF WBC: CPT | Performed by: INTERNAL MEDICINE

## 2019-11-01 PROCEDURE — 85007 BL SMEAR W/DIFF WBC COUNT: CPT | Performed by: INTERNAL MEDICINE

## 2019-11-01 PROCEDURE — 71045 X-RAY EXAM CHEST 1 VIEW: CPT

## 2019-11-01 PROCEDURE — 25010000002 MIDAZOLAM 50 MG/10ML SOLUTION 10 ML VIAL: Performed by: INTERNAL MEDICINE

## 2019-11-01 PROCEDURE — 83050 HGB METHEMOGLOBIN QUAN: CPT

## 2019-11-01 PROCEDURE — 25010000002 PIPERACILLIN SOD-TAZOBACTAM PER 1 G: Performed by: INTERNAL MEDICINE

## 2019-11-01 RX ADMIN — Medication 1 CAPSULE: at 20:32

## 2019-11-01 RX ADMIN — TAZOBACTAM SODIUM AND PIPERACILLIN SODIUM 3.38 G: 375; 3 INJECTION, SOLUTION INTRAVENOUS at 11:13

## 2019-11-01 RX ADMIN — SODIUM CHLORIDE, PRESERVATIVE FREE 10 ML: 5 INJECTION INTRAVENOUS at 10:21

## 2019-11-01 RX ADMIN — SODIUM CHLORIDE, PRESERVATIVE FREE 10 ML: 5 INJECTION INTRAVENOUS at 21:00

## 2019-11-01 RX ADMIN — SODIUM CHLORIDE, PRESERVATIVE FREE 10 ML: 5 INJECTION INTRAVENOUS at 20:32

## 2019-11-01 RX ADMIN — CHLORHEXIDINE GLUCONATE 0.12% ORAL RINSE 15 ML: 1.2 LIQUID ORAL at 09:03

## 2019-11-01 RX ADMIN — SODIUM CHLORIDE, PRESERVATIVE FREE 10 ML: 5 INJECTION INTRAVENOUS at 00:26

## 2019-11-01 RX ADMIN — FAMOTIDINE 20 MG: 10 INJECTION, SOLUTION INTRAVENOUS at 09:04

## 2019-11-01 RX ADMIN — FAMOTIDINE 20 MG: 10 INJECTION, SOLUTION INTRAVENOUS at 20:32

## 2019-11-01 RX ADMIN — SODIUM CHLORIDE, PRESERVATIVE FREE 10 ML: 5 INJECTION INTRAVENOUS at 10:20

## 2019-11-01 RX ADMIN — SODIUM CHLORIDE, PRESERVATIVE FREE 10 ML: 5 INJECTION INTRAVENOUS at 00:25

## 2019-11-01 RX ADMIN — PROPOFOL 40 MCG/KG/MIN: 10 INJECTION, EMULSION INTRAVENOUS at 10:17

## 2019-11-01 RX ADMIN — MIDAZOLAM 1 MG/HR: 5 INJECTION INTRAMUSCULAR; INTRAVENOUS at 18:38

## 2019-11-01 RX ADMIN — CHLORHEXIDINE GLUCONATE 0.12% ORAL RINSE 15 ML: 1.2 LIQUID ORAL at 20:33

## 2019-11-01 RX ADMIN — SODIUM CHLORIDE 150 ML/HR: 9 INJECTION, SOLUTION INTRAVENOUS at 08:52

## 2019-11-01 RX ADMIN — SODIUM CHLORIDE 150 ML/HR: 9 INJECTION, SOLUTION INTRAVENOUS at 01:39

## 2019-11-01 RX ADMIN — ENOXAPARIN SODIUM 40 MG: 40 INJECTION SUBCUTANEOUS at 00:25

## 2019-11-01 RX ADMIN — VANCOMYCIN HYDROCHLORIDE 1250 MG: 500 INJECTION, POWDER, LYOPHILIZED, FOR SOLUTION INTRAVENOUS at 22:51

## 2019-11-01 RX ADMIN — PROPOFOL 50 MCG/KG/MIN: 10 INJECTION, EMULSION INTRAVENOUS at 02:54

## 2019-11-01 RX ADMIN — TAZOBACTAM SODIUM AND PIPERACILLIN SODIUM 3.38 G: 375; 3 INJECTION, SOLUTION INTRAVENOUS at 20:32

## 2019-11-01 RX ADMIN — MULTIVITAMIN 15 ML: LIQUID ORAL at 09:04

## 2019-11-01 RX ADMIN — TAZOBACTAM SODIUM AND PIPERACILLIN SODIUM 3.38 G: 375; 3 INJECTION, SOLUTION INTRAVENOUS at 04:53

## 2019-11-01 RX ADMIN — VANCOMYCIN HYDROCHLORIDE 1250 MG: 500 INJECTION, POWDER, LYOPHILIZED, FOR SOLUTION INTRAVENOUS at 11:13

## 2019-11-01 RX ADMIN — Medication 2 MCG/KG/MIN: at 04:52

## 2019-11-01 RX ADMIN — PROPOFOL 35 MCG/KG/MIN: 10 INJECTION, EMULSION INTRAVENOUS at 22:22

## 2019-11-01 RX ADMIN — Medication 1 CAPSULE: at 09:04

## 2019-11-01 RX ADMIN — SODIUM CHLORIDE, PRESERVATIVE FREE 10 ML: 5 INJECTION INTRAVENOUS at 10:22

## 2019-11-01 RX ADMIN — SODIUM CHLORIDE, PRESERVATIVE FREE 10 ML: 5 INJECTION INTRAVENOUS at 09:04

## 2019-11-01 RX ADMIN — PROPOFOL 40 MCG/KG/MIN: 10 INJECTION, EMULSION INTRAVENOUS at 15:33

## 2019-11-01 NOTE — PROGRESS NOTES
[facility]    Name:  Eddie Prieto   Age:  44 y.o.  Sex:  male  :  1975  MRN:  [id number]   Visit Number:  [id number]  Admission Date:  10/30/2019  Date Of Service:  19  Primary Care Physician:  Shivani Canales APRN     LOS: 2 days :  Patient Care Team:  Shivani Canales APRN as PCP - General (Family Medicine):    Chief Complaint:      Patient seen today in follow-up for septic shock and drug overdose    Subjective / Interval History:     Patient remains intubated and sedated this morning.  Family was present at bedside.  No events reported from nursing overnight.  Have been able to discontinue phenylephrine for blood pressure support.  No subjective history was able to be obtained due to sedation.  Leg was updated and questions answered.    Review of Systems:     Unable to obtain    Vital Signs:    Temp:  [98.4 °F (36.9 °C)-99.7 °F (37.6 °C)] 98.8 °F (37.1 °C)  Heart Rate:  [89-97] 94  Resp:  [20-24] 24  BP: ()/(51-75) 118/65  FiO2 (%):  [60 %-80 %] 60 %    Intake and output:    I/O last 3 completed shifts:  In: 7943 [I.V.:6062; Other:200; IV Piggyback:1681]  Out:  [Urine:]  No intake/output data recorded.    Physical Examination:    General Appearance:   Intubated and sedated   Head:  Atraumatic and normocephalic, without obvious abnormality.   Eyes:          Pinpoint pupils, conjunctivae and sclerae normal, no Icterus. No pallor.    Neck: Supple, trachea midline,    Lungs:   Chest shape is normal. Breath sounds heard bilaterally equally.  No crackles or wheezing.    Heart:  Normal S1 and S2, no murmur,  No JVD   Abdomen:   Normal bowel sounds, Soft, non-tender, non-distended,    Extremities: no edema, no cyanosis, no clubbing.   Skin: No bleeding, multiple skin wounds from IV drug use.   Neurologic:  Sedated and unable to follow commands.     Laboratory results:    Results from last 7 days   Lab Units 19  0414  10/31/19  0500 10/30/19  1957   SODIUM mmol/L 142 141 139   POTASSIUM mmol/L 5.6* 3.6 3.9   CHLORIDE mmol/L 113* 109* 99   CO2 mmol/L 22.4 23.3 18.8*   BUN mg/dL 32* 19 18   CREATININE mg/dL 1.34* 0.98 1.09   CALCIUM mg/dL 7.7* 8.0* 9.3   BILIRUBIN mg/dL  --  0.4 0.2   ALK PHOS U/L  --  100 140*   ALT (SGPT) U/L  --  32 39   AST (SGOT) U/L  --  38 50*   GLUCOSE mg/dL 101* 117* 315*     Results from last 7 days   Lab Units 11/01/19  0411 10/31/19  0500 10/30/19  1957   WBC 10*3/mm3 16.36* 2.64* 21.80*   HEMOGLOBIN g/dL 10.8* 12.0* 12.3*   HEMATOCRIT % 37.5 40.3 42.3   PLATELETS 10*3/mm3 308 267 460*         Results from last 7 days   Lab Units 10/31/19  0500 10/30/19  1957   TROPONIN T ng/mL <0.010 <0.010     Results from last 7 days   Lab Units 10/30/19  2137 10/30/19  2127   BLOODCX  No growth at 24 hours No growth at 24 hours       I have reviewed the patient's laboratory results.    Radiology results:    Imaging Results (Last 24 Hours)     Procedure Component Value Units Date/Time    XR Chest 1 View [[id number]] Collected:  11/01/19 0836     Updated:  11/01/19 0839    Narrative:       PROCEDURE: XR CHEST 1 VW-     HISTORY: Resp Failure.; J96.01-Acute respiratory failure with hypoxia;  J96.02-Acute respiratory failure with hypercapnia; R79.89-Other  specified abnormal findings of blood chemistry; J69.0-Pneumonitis due to  inhalation of food and vomit; T50.904A-Poisoning by unspecified drugs,  medicaments and biological substances, undetermined, initial encounter;  A41.9-Sepsis, unspecified organism     COMPARISON: 10/31/2019.     FINDINGS: The support tubes and lines are appropriately positioned. The  heart is normal in size. The mediastinum is unremarkable. There is  continued worsening of the patient's bilateral airspace disease. No  significant effusions are evident. There is no pneumothorax.  There are  no acute osseous abnormalities.       Impression:       Worsening bilateral airspace disease with the  differential  including ARDS and pneumonia.     Continued followup is recommended.     This report was finalized on 11/1/2019 8:37 AM by Chris Patricio M.D..          I have reviewed the patient's radiology reports.    Medication Review:     I have reviewed the patients active and prn medications.       Acute respiratory failure with hypoxia and hypercapnia (CMS/HCC)    Accidental drug overdose    Toxic encephalopathy    Sepsis (CMS/HCC)    Aspiration pneumonia of both lower lobes due to vomit (CMS/HCC)    Tobacco dependence    Drug abuse (CMS/HCC)    Hyperglycemia      Assessment:    Acute toxic encephalopathy, present on admission.  Acute hypoxic and hypercapnic respiratory failure secondary to #2, present on admission.  Acute drug overdose with opiates and methamphetamine, present on admission.  Septic Shock secondary to pna, present on admission.  Bilateral lower lobe aspiration pneumonia, present on admission.  Acute kidney injury likely secondary to ATN from hypotension due to septic shock  Hyperglycemia, likely secondary to diabetes, present on admission. - resolved  Hypertensive urgency, present on admission - resolved  Twitching of all 4 extremities, suspect anoxic encephalopathy.  Nicotine dependence.  Recreational drug abuse.  Multiple skin ulcers, present on admission.    Plan:    He remains intubated and sedated with propofol and Versed.  FiO2 of 60%.  Chest x-ray done this morning showed worsening bilateral airspace disease.  Will not attempt weaning trial today.  Dr. Tamayo consulted and appreciate his recommendations.  Was able to successfully titrate off of phenylephrine.  Blood pressure has remained stable.  Goal map greater than 65.  If pressors are needed will prefer levophed now that sinus tachycardia has resolved.  Renal function significantly worsened today.  Creatinine yesterday was 0.98 and increased to 1.34 today.  SPECT this is all secondary to hypotension and septic shock now causing  ATN.  Will continue with maintenance fluids at current rate of 150 mL's per hour normal saline.  Will repeat BMP in the morning.  Dr. Arora has been consulted and appreciate his recommendations.  Continue with vancomycin and Zosyn.  MRSA screen still pending.  Blood cultures are no growth at 24 hours.  NG tube placed and awaiting KUB for confirmation.  Will start tube feeds today.  Family present this morning and have been updated regarding patient's critical condition.      Stephan Parker DO  11/01/19  11:16 AM    Dictated utilizing Dragon dictation.

## 2019-11-01 NOTE — PLAN OF CARE
Problem: Patient Care Overview  Goal: Plan of Care Review  Outcome: Ongoing (interventions implemented as appropriate)   11/01/19 0152   Plan of Care Review   Progress no change   Coping/Psychosocial   Plan of Care Reviewed With patient;family       Problem: Skin Injury Risk (Adult)  Goal: Identify Related Risk Factors and Signs and Symptoms  Outcome: Outcome(s) achieved Date Met: 11/01/19 11/01/19 0152   Skin Injury Risk (Adult)   Related Risk Factors (Skin Injury Risk) cognitive impairment;critical care admission;fluid intake inadequate;infection;mechanical forces;medical devices;medication;mobility impaired;moisture;nutritional deficiencies;skeletal deformities;tissue perfusion altered       Problem: Pneumonia (Adult)  Goal: Signs and Symptoms of Listed Potential Problems Will be Absent, Minimized or Managed (Pneumonia)  Outcome: Ongoing (interventions implemented as appropriate)   11/01/19 0152   Goal/Outcome Evaluation   Problems Assessed (Pneumonia) all   Problems Present (Pneumonia) fluid/electrolyte imbalance;infection progression;respiratory compromise  (Continues with mod. ETT secretions. FIO2 80 %)       Problem: Restraint, Nonbehavioral (Nonviolent)  Goal: Rationale and Justification  Outcome: Ongoing (interventions implemented as appropriate)   11/01/19 0152   Restraint, Nonbehavioral (Nonviolent)   Rationale and Justification prevent line/tube removal     Goal: Nonbehavioral (Nonviolent) Restraint: Absence of Injury/Harm  Outcome: Ongoing (interventions implemented as appropriate)   11/01/19 0152   Restraint, Nonbehavioral (Nonviolent)   Nonbehavioral (Nonviolent) Restraint: Absence of Injury/Harm met  (no injury seen )     Goal: Nonbehavioral (Nonviolent) Restraint: Achievement of Discontinuation Criteria   11/01/19 0152   Restraint, Nonbehavioral (Nonviolent)   Nonbehavioral (Nonviolent) Restraint: Achievement of Discontinuation Criteria not met  (Continues to rewuire restraints to maintain  airway.)     Goal: Nonbehavioral (Nonviolent) Restraint: Preservation of Dignity and Wellbeing  Outcome: Ongoing (interventions implemented as appropriate)   11/01/19 0152   Restraint, Nonbehavioral (Nonviolent)   Nonbehavioral (Nonviolent) Restraint: Preservation of Dignity and Wellbeing met  (Care provided preserving pts dignity and well being)       Problem: Ventilation, Mechanical Invasive (Adult)  Goal: Signs and Symptoms of Listed Potential Problems Will be Absent, Minimized or Managed (Ventilation, Mechanical Invasive)  Outcome: Ongoing (interventions implemented as appropriate)   11/01/19 0152   Goal/Outcome Evaluation   Problems Assessed (Mechanical Ventilation, Invasive) all   Problems Present (Mech Vent, Invasive) immobility;inability to wean  (continues to require 80 % FIO2.)

## 2019-11-01 NOTE — PLAN OF CARE
Problem: Ventilation, Mechanical Invasive (Adult)  Goal: Signs and Symptoms of Listed Potential Problems Will be Absent, Minimized or Managed (Ventilation, Mechanical Invasive)  Outcome: Ongoing (interventions implemented as appropriate)   11/01/19 1371   Goal/Outcome Evaluation   Problems Assessed (Mechanical Ventilation, Invasive) all   Problems Present (Mech Vent, Invasive) none

## 2019-11-01 NOTE — PROGRESS NOTES
"  CC: Acute Respiratory Failure.     S: Intubated and sedated.     ROS: Could not be obtained as the patient is on mechanical ventilator.    O:Vital signs reviewed.  FiO2: 60 %. CVP Line. Day # 2. Vent Day # 2  /65   Pulse 90   Temp 98.8 °F (37.1 °C) (Oral)   Resp 24   Ht 170.2 cm (67.01\")   Wt 73 kg (161 lb)   SpO2 99%   BMI 25.21 kg/m²     Temp (24hrs), Av.1 °F (37.3 °C), Min:98.4 °F (36.9 °C), Max:99.7 °F (37.6 °C)      I & Os reviewed.   Intake/Output       10/31/19 0700 - 19 0659    Intake (ml) 5352    Output (ml) 525    Net (ml) 4827    Last Weight  73 kg (161 lb)          General/Constitutional: Intubated. Sedated  Eyes: PERRL.   Neck: Supple without JVD. No obvious masses noted. Left IJ CVP line in place.   Cardiovascular: S1 + S2. Regular.    Lungs/Respiratory: Transmitted Breath sounds noted. No wheezing heard. Right > Left crackles noted.  GI/Abdomen: Soft. Bowel sounds positive. No organomegaly noted.  Musculoskeletal/Extremities: Trace edema noted. Gait could not be assessed at this time, as the patient was laying in bed.   Neurologic: Sedated. Detailed exam couldn't be performed due to sedation. As per RN, he became agitated on sedation vacation.    Psych: Could not be performed as he is currently sedated.   Skin: Appeared with diffuse scattered ulcerations over upper and lower extremities.     Labs: Reviewed.   Results from last 7 days   Lab Units 19  0411 10/31/19  0500 10/30/19  1957   SODIUM mmol/L 142 141 139   POTASSIUM mmol/L 5.6* 3.6 3.9   CHLORIDE mmol/L 113* 109* 99   CO2 mmol/L 22.4 23.3 18.8*   BUN mg/dL 32* 19 18   CREATININE mg/dL 1.34* 0.98 1.09   CALCIUM mg/dL 7.7* 8.0* 9.3   BILIRUBIN mg/dL  --  0.4 0.2   ALK PHOS U/L  --  100 140*   ALT (SGPT) U/L  --  32 39   AST (SGOT) U/L  --  38 50*   GLUCOSE mg/dL 101* 117* 315*       Results from last 7 days   Lab Units 10/31/19  0500   MAGNESIUM mg/dL 1.7   PHOSPHORUS mg/dL 2.2*       Results from last 7 days   Lab " Units 11/01/19  0411 10/31/19  0500 10/30/19  1957   WBC 10*3/mm3 16.36* 2.64* 21.80*   HEMOGLOBIN g/dL 10.8* 12.0* 12.3*   HEMATOCRIT % 37.5 40.3 42.3   PLATELETS 10*3/mm3 308 267 460*             Lab Results   Component Value Date    PROCALCITO 8.30 (H) 10/30/2019       Lab Results   Component Value Date    PROBNP 385.2 10/30/2019         midazolam (VERSED) infusion 1 mg/hr Last Rate: Stopped (11/01/19 0729)   Pharmacy to dose vancomycin     Pharmacy to Dose Zosyn     phenylephrine 0.5-3 mcg/kg/min Last Rate: 0.5 mcg/kg/min (11/01/19 0810)   propofol 5-50 mcg/kg/min Last Rate: 50 mcg/kg/min (11/01/19 0810)   sodium chloride 150 mL/hr Last Rate: 150 mL/hr (11/01/19 0852)       ABG: Reviewed.  Lab Results   Component Value Date    PHART 7.228 (C) 11/01/2019    PDH8RSP 53.0 (H) 11/01/2019    PO2ART 160.0 (H) 11/01/2019    HGBBG 11.6 (L) 11/01/2019    N6YRVVBU 99.2 11/01/2019    CARBOXYHGB <0.6 11/01/2019         CXRay: Latest imaging study was reviewed personally.   Imaging Results (Last 24 Hours)     Procedure Component Value Units Date/Time    XR Chest 1 View [[id number]] Collected:  11/01/19 0836     Updated:  11/01/19 0839    Narrative:       PROCEDURE: XR CHEST 1 VW-     HISTORY: Resp Failure.; J96.01-Acute respiratory failure with hypoxia;  J96.02-Acute respiratory failure with hypercapnia; R79.89-Other  specified abnormal findings of blood chemistry; J69.0-Pneumonitis due to  inhalation of food and vomit; T50.904A-Poisoning by unspecified drugs,  medicaments and biological substances, undetermined, initial encounter;  A41.9-Sepsis, unspecified organism     COMPARISON: 10/31/2019.     FINDINGS: The support tubes and lines are appropriately positioned. The  heart is normal in size. The mediastinum is unremarkable. There is  continued worsening of the patient's bilateral airspace disease. No  significant effusions are evident. There is no pneumothorax.  There are  no acute osseous abnormalities.       Impression:        Worsening bilateral airspace disease with the differential  including ARDS and pneumonia.     Continued followup is recommended.     This report was finalized on 11/1/2019 8:37 AM by Chris Patricio M.D..    XR Chest 1 View [[id number]] Collected:  10/31/19 1043     Updated:  10/31/19 1047    Narrative:       PROCEDURE: XR CHEST 1 VW-     HISTORY: Central line placement; J96.01-Acute respiratory failure with  hypoxia; J96.02-Acute respiratory failure with hypercapnia; R79.89-Other  specified abnormal findings of blood chemistry; J69.0-Pneumonitis due to  inhalation of food and vomit; T50.904A-Poisoning by unspecified drugs,  medicaments and biological substances, undetermined, initial encounter;  A41.9-Sepsis, unspecified organism     COMPARISON: 10/30/2019.     FINDINGS: There has been interval placement of a left internal jugular  central venous catheter with the tip in the SVC. Endotracheal and  nasogastric tubes are well-positioned. The heart is normal in size. The  mediastinum is unremarkable. There has been interval worsening of the  patient's perihilar airspace disease. There is no pneumothorax.  There  are no acute osseous abnormalities.       Impression:       1. Placement of a left internal jugular central venous catheter with no  evidence of pneumothorax.  2. Worsening bilateral airspace disease.     Continued followup is recommended.     This report was finalized on 10/31/2019 10:45 AM by Chris Patricio M.D..            Assessment & Recommendations/Plan:   1.  Acute Respiratory Failure  Continue mechanical ventilation  Adjustments were made due to continued respiratory acidosis  We will repeat chest x-ray and ABG in the morning  Due to extensive pulmonary opacities and FiO2 of around 60%, we will continue mechanical ventilation for another 24-48 hours.  I recommended continued sedation vacation attempts.  He may need to be started on other infusions, such as fentanyl, given his history of  heroin and other opiate overdose.  Extubation may be somewhat challenging in this patient because of drug overdose.    2.  Left sided ?aspiration? Pneumonia.   Worsening bilateral airspace disease likely from extensive pneumonia  ARDS remains in the differential diagnosis although his oxygenation has improved significantly which goes against this possibility.  We will repeat procalcitonin level on 3 November.    3.  Respiratory acidosis  Improved, after adjustments were made to the ventilator.    4.  Septic Shock.  Currently off pressors.    5.  Drug overdose  Toxicology screen was positive for multiple agents.    6.  Acute kidney injury  Likely from septic shock.  Urine output extremely poor over the past 24 hours.  Will advise close follow-up.  May consider nephrology consultation    7.  Nutrition  Will start Jevity.     Critical Care time spent in direct patient care: 40 minutes including high complexity decision making to assess, manipulate, and support vital organ system failure in this individual who has impairment of one or more vital organ systems such that there is a high probability of imminent or life threatening deterioration in the patient’s condition. This time excludes other billable procedures. Time does include preparation of documents, review of old records, and direct bedside care.    We have reviewed patient's current orders and changes, if any, have been suggested to primary care team. Plan was also discussed with nursing staff, as necessary.     We have updated the admitting attending and nursing staff, as appropriate, on the patient's current status and plan. I will be going off shift tonight and will be unavailable.     This document was electronically signed by Bria Tamayo MD on 11/01/19 at 9:30 AM      Dictated utilizing Dragon dictation.

## 2019-11-01 NOTE — CONSULTS
[facility]      Nephrology Consultation    Referring Provider:   No ref. provider found    Reason for Consultation:  Acute Kidney Injury and associated problems.    Subjective:  Chief complaint   Chief Complaint   Patient presents with   • Drug Overdose     History of present illness:    Patient is 44-year-old  male was brought into the emergency room by the EMS after they were called by someone who was driving the patient around that he became unresponsive.  Patient was given 2 mg of IV Narcan with some improvement in his mental status he was agitated and confused.  He was brought into the emergency department and subsequently intubated, it appears that he vomited and aspirated.  His lactic acid was 6.6 and procalcitonin was 8.3 noted to be in septic shock requiring IV fluids and pressors.  He is still intubated and sedated.  He has worsening of his renal function and I was consulted for further evaluation and treatment.  During all this process the lowest blood pressure recorded is 70 systolic.  I have reviewed labs/imaging/records from this hospitalization, including ER staff and admitting/attending physicians H/P's and progress notes to establish a comprehensive understanding of this patient's clinical hospital course, as well as to establish plan of care appropriately.   Past Medical History:   Diagnosis Date   • Lung disease        Past Surgical History:   Procedure Laterality Date   • LEG SURGERY      broken bone   • NECK SURGERY       History reviewed. No pertinent family history.      Social History     Tobacco Use   • Smoking status: Current Every Day Smoker     Packs/day: 0.50     Types: Cigarettes   • Smokeless tobacco: Current User   Substance Use Topics   • Alcohol use: No   • Drug use: Yes     Comment: heroin     Home medications:   Prior to Admission Medications     Prescriptions Last Dose Informant Patient Reported? Taking?    albuterol sulfate  (90 Base) MCG/ACT  inhaler Unknown  Yes No    Inhale 2 puffs Every 4 (Four) Hours As Needed for Wheezing.    clindamycin (CLEOCIN) 300 MG capsule Unknown  No No    Take 1 capsule by mouth 4 (Four) Times a Day.    Patient not taking:  Reported on 10/31/2019        Emergency department medications: Medications   propofol (DIPRIVAN) infusion 10 mg/mL 100 mL (50 mcg/kg/min × 71.8 kg Intravenous Restarted 11/1/19 0810)   dextrose (GLUTOSE) oral gel 1 tube (not administered)   dextrose (D50W) 25 g/ 50mL Intravenous Solution 25 g (not administered)   glucagon (human recombinant) (GLUCAGEN DIAGNOSTIC) injection 1 mg (not administered)   chlorhexidine (PERIDEX) 0.12 % solution 15 mL (15 mL Mouth/Throat Given 11/1/19 0903)   sodium chloride 0.9 % flush 10 mL (10 mL Intravenous Given 11/1/19 0026)   sodium chloride 0.9 % flush 10 mL (not administered)   acetaminophen (TYLENOL) tablet 650 mg (not administered)     Or   acetaminophen (TYLENOL) 160 MG/5ML solution 650 mg (not administered)     Or   acetaminophen (TYLENOL) suppository 650 mg (not administered)   ondansetron (ZOFRAN) injection 4 mg (not administered)   enoxaparin (LOVENOX) syringe 40 mg (40 mg Subcutaneous Given 11/1/19 0025)   famotidine (PEPCID) injection 20 mg (20 mg Intravenous Given 11/1/19 0904)   sodium chloride 0.9 % infusion (150 mL/hr Intravenous New Bag 11/1/19 0852)   insulin regular (humuLIN R,novoLIN R) injection 0-7 Units (0 Units Subcutaneous Not Given 11/1/19 0629)   multivitamin with iron-minerals liquid 15 mL (15 mL Oral Given 11/1/19 0904)   Pharmacy to Dose Zosyn (not administered)   Pharmacy to dose vancomycin (not administered)   lactobacillus acidophilus (RISAQUAD) capsule 1 capsule (1 capsule Oral Given 11/1/19 0904)   labetalol (NORMODYNE,TRANDATE) injection 10 mg (not administered)   midazolam (VERSED) 5 MG/ML injection  - ADS Override Pull (  Not Given 10/30/19 7207)   Morphine sulfate (PF) injection 4 mg (4 mg Intravenous Given 10/30/19 6245)   midazolam  (VERSED) 0.5 mg/mL in sodium chloride 0.9 % 100 mL infusion (0 mg/hr Intravenous Stopped 11/1/19 0729)   piperacillin-tazobactam (ZOSYN) 3.375 g in iso-osmotic dextrose 50 ml (premix) (3.375 g Intravenous New Bag 11/1/19 0453)   vancomycin 1250 mg in sodium chloride 0.9% 250 mL IVPB (1,250 mg Intravenous New Bag 10/31/19 2150)   Vancomycin trough 11/1/2019 @ 0930; hold next dose for level greater than 20 mcg/ml (not administered)   sodium chloride 0.9 % flush 10 mL (10 mL Intravenous Given 11/1/19 0025)   sodium chloride 0.9 % flush 10 mL (10 mL Intravenous Given 11/1/19 0025)   sodium chloride 0.9 % flush 10 mL (10 mL Intravenous Given 11/1/19 0904)   sodium chloride 0.9 % flush 10 mL (not administered)   sodium chloride 0.9 % flush 20 mL (not administered)   phenylephrine (VIOLET-SYNEPHRINE) 50 mg/250 mL (0.2 mg/mL) in 0.9% NS  infusion (0.5 mcg/kg/min × 71.4 kg Intravenous Rate/Dose Change 11/1/19 0810)   etomidate (AMIDATE) injection 20 mg (20 mg Intravenous Given 10/30/19 1940)   rocuronium (ZEMURON) injection 100 mg (100 mg Intravenous Given 10/30/19 1945)   Midazolam HCl (PF) (VERSED) injection 2 mg (2 mg Intravenous Given 10/30/19 1951)   Naloxone HCl (NARCAN) injection 2 mg (2 mg Intravenous Given 10/30/19 1939)   sodium chloride 0.9 % bolus 1,000 mL (0 mL Intravenous Stopped 10/30/19 2153)   vancomycin 1750 mg in sodium chloride 0.9% 500 mL IVPB (1,750 mg Intravenous New Bag 10/30/19 2226)   piperacillin-tazobactam (ZOSYN) 3.375 g in iso-osmotic dextrose 50 ml (premix) (0 g Intravenous Stopped 10/30/19 2224)   sodium chloride 0.9 % bolus 1,000 mL (1,000 mL Intravenous New Bag 10/30/19 2154)   Midazolam HCl (PF) (VERSED) injection 2 mg (2 mg Intravenous Given 10/30/19 2237)       Allergies:  Ultram [tramadol hcl]    Review of Systems  1. No meaningful review of system possible, patient is intubated and sedated.    Objective:  Vital Signs  /65   Pulse 90   Temp 98.8 °F (37.1 °C) (Oral)   Resp 24   Ht  "170.2 cm (67.01\")   Wt 73 kg (161 lb)   SpO2 99%   BMI 25.21 kg/m²          No intake/output data recorded.    Intake/Output Summary (Last 24 hours) at 11/1/2019 0932  Last data filed at 11/1/2019 0500  Gross per 24 hour   Intake 5352 ml   Output 525 ml   Net 4827 ml       Physical Exam:  General Appearance:   In no acute distress.     Head:   Normocephalic, without obvious abnormality, atraumatic.     Eyes:      Normal, conjunctivae and sclerae, no icterus, no pallor, corneas clear, PERRLA        Throat:   Oral mucosa dry      Neck:  No adenopathy, supple, trachea midline, no thyromegaly, no carotid bruit, no JVD      Back:   No CVA tenderness on Percussion.     Lungs:    Clear to auscultation and fair air movement noted with occasional coarse breath sounds.      Heart::   Regular rhythm and normal rate, normal S1 and S2.       Abdomen:   Obese. Normal bowel sounds, no masses, no organomegaly, soft non-tender, non-distended, no guarding, no rebound tenderness      Genital urinary:   No urinary bladder palpable      Extremities:  Moves all extremities, no edema, no cyanosis, no redness.     Pulses:  Pulses palpable and equal bilaterally but weak.     Skin:  No bleeding, bruising or rash        Neurologic:   Intubated and sedated           Lab Results (last 7 days)     Procedure Component Value Units Date/Time    Blood Gas, Arterial With Co-Ox [[id number]]  (Abnormal) Collected:  11/01/19 0738    Specimen:  Arterial Blood Updated:  11/01/19 0740     Site Right Radial     Fran's Test Positive     pH, Arterial 7.228 pH units      Comment: 84 Value below reference range        pCO2, Arterial 53.0 mm Hg      Comment: 83 Value above reference range        pO2, Arterial 160.0 mm Hg      Comment: 83 Value above reference range        HCO3, Arterial 22.1 mmol/L      Base Excess, Arterial -5.8 mmol/L      Comment: 84 Value below reference range        O2 Saturation, Arterial 99.2 %      Comment: 83 Value above reference " range        Hemoglobin, Blood Gas 11.6 g/dL      Comment: 84 Value below reference range        Hematocrit, Blood Gas 35.5 %      Comment: 84 Value below reference range        Oxyhemoglobin 97.8 %      Methemoglobin 1.40 %      Carboxyhemoglobin <0.6 %      Comment: 94 Value below reportable range < 0.6        A-a Gradiant 200.2 mmHg      Barometric Pressure for Blood Gas 742 mmHg      Modality Ventilator     FIO2 60 %      Ventilator Mode AC     Set Tidal Volume 550     Set Mech Resp Rate 24.0     Comment: Meter: [id number]     :  [id number]        PEEP 6.0     Note --     pH, Temp Corrected --     pCO2, Temperature Corrected --     pO2, Temperature Corrected --    POC Glucose Once [[id number]]  (Normal) Collected:  11/01/19 0609    Specimen:  Blood Updated:  11/01/19 0615     Glucose 98 mg/dL      Comment: Serial Number: [id number]:  [id number]       Basic Metabolic Panel [[id number]]  (Abnormal) Collected:  11/01/19 0411    Specimen:  Blood Updated:  11/01/19 0534     Glucose 101 mg/dL      BUN 32 mg/dL      Creatinine 1.34 mg/dL      Sodium 142 mmol/L      Potassium 5.6 mmol/L      Chloride 113 mmol/L      CO2 22.4 mmol/L      Calcium 7.7 mg/dL      eGFR Non African Amer 58 mL/min/1.73      BUN/Creatinine Ratio 23.9     Anion Gap 6.6 mmol/L     Narrative:       GFR Normal >60  Chronic Kidney Disease <60  Kidney Failure <15    CBC & Differential [[id number]] Collected:  11/01/19 0411    Specimen:  Blood Updated:  11/01/19 0534    Narrative:       The following orders were created for panel order CBC & Differential.  Procedure                               Abnormality         Status                     ---------                               -----------         ------                     CBC Auto Differential[[id number]]        Abnormal            Final result                 Please view results for these tests on the individual orders.    CBC Auto Differential [[id number]]  (Abnormal) Collected:   11/01/19 0411    Specimen:  Blood Updated:  11/01/19 0534     WBC 16.36 10*3/mm3      RBC 4.31 10*6/mm3      Hemoglobin 10.8 g/dL      Hematocrit 37.5 %      MCV 87.0 fL      MCH 25.1 pg      MCHC 28.8 g/dL      RDW 15.7 %      RDW-SD 50.4 fl      MPV 9.9 fL      Platelets 308 10*3/mm3     Scan Slide [[id number]] Collected:  11/01/19 0411    Specimen:  Blood Updated:  11/01/19 0534     Scan Slide --     Comment: See Manual Differential Results       Manual Differential [[id number]]  (Abnormal) Collected:  11/01/19 0411    Specimen:  Blood Updated:  11/01/19 0534     Neutrophil % 44.0 %      Lymphocyte % 12.0 %      Monocyte % 2.0 %      Eosinophil % 2.0 %      Basophil % 1.0 %      Bands %  37.0 %      Metamyelocyte % 1.0 %      Atypical Lymphocyte % 1.0 %      Neutrophils Absolute 13.25 10*3/mm3      Lymphocytes Absolute 1.96 10*3/mm3      Monocytes Absolute 0.33 10*3/mm3      Eosinophils Absolute 0.33 10*3/mm3      Basophils Absolute 0.16 10*3/mm3      Anisocytosis Slight/1+     Hypochromia Slight/1+     WBC Morphology Normal     Platelet Estimate Adequate    Blood Gas, Arterial With Co-Ox [[id number]]  (Abnormal) Collected:  11/01/19 0504    Specimen:  Arterial Blood Updated:  11/01/19 0504     Site Right Brachial     Fran's Test N/A     pH, Arterial 7.157 pH units      Comment: 85 Value below critical limit        pCO2, Arterial 64.4 mm Hg      Comment: 83 Value above reference range        pO2, Arterial 201.0 mm Hg      Comment: 83 Value above reference range        HCO3, Arterial 22.7 mmol/L      Base Excess, Arterial -6.7 mmol/L      Comment: 84 Value below reference range        O2 Saturation, Arterial 99.2 %      Comment: 83 Value above reference range        Hemoglobin, Blood Gas 11.5 g/dL      Comment: 84 Value below reference range        Hematocrit, Blood Gas 35.3 %      Comment: 84 Value below reference range        Oxyhemoglobin 97.6 %      Methemoglobin 1.60 %      Carboxyhemoglobin <0.6 %       Comment: 94 Value below reportable range < 0.6        A-a Gradiant 287.8 mmHg      Barometric Pressure for Blood Gas 741 mmHg      Modality Ventilator     FIO2 80 %      Ventilator Mode AC     Set Tidal Volume 550     Set Mech Resp Rate 20.0     Comment: Meter: [id number]     :  [id number]        PEEP 6.0     Note --     pH, Temp Corrected --     pCO2, Temperature Corrected --     pO2, Temperature Corrected --    POC Glucose Once [[id number]]  (Normal) Collected:  11/01/19 0017    Specimen:  Blood Updated:  11/01/19 0020     Glucose 94 mg/dL      Comment: Serial Number: [id number]:  [id number]       Blood Culture With JEANNINE - Blood, Arm, Left [[id number]] Collected:  10/30/19 2137    Specimen:  Blood from Arm, Left Updated:  10/31/19 2209     Blood Culture No growth at 24 hours    Blood Culture With JEANNINE - Blood, Arm, Left [[id number]] Collected:  10/30/19 2127    Specimen:  Blood from Arm, Left Updated:  10/31/19 2209     Blood Culture No growth at 24 hours    POC Glucose Once [[id number]]  (Normal) Collected:  10/31/19 1740    Specimen:  Blood Updated:  10/31/19 1745     Glucose 89 mg/dL      Comment: Serial Number: [id number]:  [id number]       MRSA Screen Culture - Swab, Nares [[id number]] Collected:  10/31/19 1027    Specimen:  Swab from Nares Updated:  10/31/19 1330    Blood Gas, Arterial With Co-Ox [[id number]]  (Abnormal) Collected:  10/31/19 1211    Specimen:  Arterial Blood Updated:  10/31/19 1214     Site Left Brachial     Fran's Test N/A     pH, Arterial 7.258 pH units      Comment: 84 Value below reference range        pCO2, Arterial 54.5 mm Hg      Comment: 83 Value above reference range        pO2, Arterial 111.0 mm Hg      Comment: 83 Value above reference range        HCO3, Arterial 24.3 mmol/L      Base Excess, Arterial -3.3 mmol/L      Comment: 84 Value below reference range        O2 Saturation, Arterial 98.2 %      Hemoglobin, Blood Gas 12.1 g/dL      Comment: 84 Value below  reference range        Hematocrit, Blood Gas 37.1 %      Comment: 84 Value below reference range        Oxyhemoglobin 96.8 %      Methemoglobin 0.90 %      Carboxyhemoglobin <0.7 %      Comment: 94 Value below reportable range < 0.7        A-a Gradiant 446.0 mmHg      Barometric Pressure for Blood Gas 728 mmHg      Modality Ventilator     FIO2 90 %      Ventilator Mode NA     Comment: Meter: [id number]     :  [id number]        Note --     pH, Temp Corrected --     pCO2, Temperature Corrected --     pO2, Temperature Corrected --    POC Glucose Once [[id number]]  (Normal) Collected:  10/31/19 1148    Specimen:  Blood Updated:  10/31/19 1200     Glucose 105 mg/dL      Comment: Serial Number: [id number]:  [id number]       Comprehensive Metabolic Panel [[id number]]  (Abnormal) Collected:  10/31/19 0500    Specimen:  Blood Updated:  10/31/19 0614     Glucose 117 mg/dL      BUN 19 mg/dL      Creatinine 0.98 mg/dL      Sodium 141 mmol/L      Potassium 3.6 mmol/L      Chloride 109 mmol/L      CO2 23.3 mmol/L      Calcium 8.0 mg/dL      Total Protein 6.5 g/dL      Albumin 2.60 g/dL      ALT (SGPT) 32 U/L      AST (SGOT) 38 U/L      Alkaline Phosphatase 100 U/L      Total Bilirubin 0.4 mg/dL      eGFR Non African Amer 83 mL/min/1.73      Globulin 3.9 gm/dL      A/G Ratio 0.7 g/dL      BUN/Creatinine Ratio 19.4     Anion Gap 8.7 mmol/L     Narrative:       GFR Normal >60  Chronic Kidney Disease <60  Kidney Failure <15    Scan Slide [[id number]] Collected:  10/31/19 0500    Specimen:  Blood Updated:  10/31/19 0607     Scan Slide --     Comment: See Manual Differential Results       CBC Auto Differential [[id number]]  (Abnormal) Collected:  10/31/19 0500    Specimen:  Blood Updated:  10/31/19 0607     WBC 2.64 10*3/mm3      RBC 4.84 10*6/mm3      Hemoglobin 12.0 g/dL      Hematocrit 40.3 %      MCV 83.3 fL      MCH 24.8 pg      MCHC 29.8 g/dL      RDW 14.6 %      RDW-SD 44.4 fl      MPV 9.1 fL      Platelets 267  10*3/mm3     Manual Differential [[id number]]  (Abnormal) Collected:  10/31/19 0500    Specimen:  Blood Updated:  10/31/19 0607     Neutrophil % 45.0 %      Lymphocyte % 18.0 %      Monocyte % 11.0 %      Eosinophil % 3.0 %      Bands %  21.0 %      Metamyelocyte % 2.0 %      Neutrophils Absolute 1.74 10*3/mm3      Lymphocytes Absolute 0.48 10*3/mm3      Monocytes Absolute 0.29 10*3/mm3      Eosinophils Absolute 0.08 10*3/mm3      RBC Morphology Normal     WBC Morphology Normal     Platelet Estimate Adequate    POC Glucose Once [[id number]]  (Normal) Collected:  10/31/19 0558    Specimen:  Blood Updated:  10/31/19 0601     Glucose 98 mg/dL      Comment: Serial Number: [id number]:  [id number]       Troponin [[id number]]  (Normal) Collected:  10/31/19 0500    Specimen:  Blood Updated:  10/31/19 0600     Troponin T <0.010 ng/mL     Narrative:       Troponin T Reference Range:  <= 0.03 ng/mL-   Negative for AMI  >0.03 ng/mL-     Abnormal for myocardial necrosis.  Clinicians would have to utilize clinical acumen, EKG, Troponin and serial changes to determine if it is an Acute Myocardial Infarction or myocardial injury due to an underlying chronic condition.     TSH [[id number]]  (Normal) Collected:  10/31/19 0500    Specimen:  Blood Updated:  10/31/19 0600     TSH 1.010 uIU/mL     Magnesium [[id number]]  (Normal) Collected:  10/31/19 0500    Specimen:  Blood Updated:  10/31/19 0548     Magnesium 1.7 mg/dL     Phosphorus [[id number]]  (Abnormal) Collected:  10/31/19 0500    Specimen:  Blood Updated:  10/31/19 0548     Phosphorus 2.2 mg/dL     Lactic Acid, Reflex [[id number]]  (Normal) Collected:  10/31/19 0112    Specimen:  Blood Updated:  10/31/19 0157     Lactate 1.7 mmol/L     Lactic Acid, Reflex Timer (This will reflex a repeat order 3-3:15 hours after ordered.) [[id number]] Collected:  10/30/19 1957    Specimen:  Blood Updated:  10/31/19 0015     Extra Tube Hold for add-ons.     Comment: Auto resulted.       POC Glucose  Once [[id number]]  (Normal) Collected:  10/30/19 2353    Specimen:  Blood Updated:  10/31/19 0000     Glucose 123 mg/dL      Comment: Serial Number: [id number]:  [id number]       Hemoglobin A1c [[id number]]  (Abnormal) Collected:  10/30/19 1957    Specimen:  Blood Updated:  10/30/19 2214     Hemoglobin A1C 6.00 %     Narrative:       Hemoglobin A1C Ranges:    Increased Risk for Diabetes  5.7% to 6.4%  Diabetes                     >= 6.5%  Diabetic Goal                < 7.0%    Lactic Acid, Plasma [[id number]]  (Abnormal) Collected:  10/30/19 1957    Specimen:  Blood Updated:  10/30/19 2103     Lactate 6.6 mmol/L     Blood Gas, Arterial With Co-Ox [[id number]]  (Abnormal) Collected:  10/30/19 2045    Specimen:  Arterial Blood Updated:  10/30/19 2047     Site Right Radial     Fran's Test Positive     pH, Arterial 7.141 pH units      Comment: 85 Value below critical limit        pCO2, Arterial 75.2 mm Hg      Comment: 86 Value above critical limit        pO2, Arterial 166.0 mm Hg      Comment: 83 Value above reference range        HCO3, Arterial 25.6 mmol/L      Base Excess, Arterial -4.9 mmol/L      Comment: 84 Value below reference range        O2 Saturation, Arterial 98.3 %      Hemoglobin, Blood Gas 12.9 g/dL      Comment: 84 Value below reference range        Hematocrit, Blood Gas 39.5 %      Oxyhemoglobin 95.7 %      Methemoglobin 1.20 %      Carboxyhemoglobin 1.4 %      A-a Gradiant 443.1 mmHg      Barometric Pressure for Blood Gas 731 mmHg      Modality Ventilator     FIO2 100 %      Ventilator Mode AC     Set Tidal Volume 600     Set Mec Resp Rate 16.0     Comment: Meter: [id number]     :  [id number]        PEEP 5.0     Note --     pH, Temp Corrected --     pCO2, Temperature Corrected --     pO2, Temperature Corrected --    Salicylate Level [[id number]]  (Normal) Collected:  10/30/19 1957    Specimen:  Blood Updated:  10/30/19 2044     Salicylate <0.3 mg/dL     Comprehensive Metabolic Panel  [[id number]]  (Abnormal) Collected:  10/30/19 1957    Specimen:  Blood Updated:  10/30/19 2044     Glucose 315 mg/dL      Comment: Glucose >180, Hemoglobin A1C recommended.        BUN 18 mg/dL      Creatinine 1.09 mg/dL      Sodium 139 mmol/L      Potassium 3.9 mmol/L      Chloride 99 mmol/L      CO2 18.8 mmol/L      Calcium 9.3 mg/dL      Total Protein 8.7 g/dL      Albumin 3.60 g/dL      ALT (SGPT) 39 U/L      AST (SGOT) 50 U/L      Alkaline Phosphatase 140 U/L      Total Bilirubin 0.2 mg/dL      eGFR Non African Amer 73 mL/min/1.73      Globulin 5.1 gm/dL      A/G Ratio 0.7 g/dL      BUN/Creatinine Ratio 16.5     Anion Gap 21.2 mmol/L     Narrative:       GFR Normal >60  Chronic Kidney Disease <60  Kidney Failure <15    CBC & Differential [[id number]] Collected:  10/30/19 1957    Specimen:  Blood Updated:  10/30/19 2039    Narrative:       The following orders were created for panel order CBC & Differential.  Procedure                               Abnormality         Status                     ---------                               -----------         ------                     CBC Auto Differential[[id number]]        Abnormal            Final result                 Please view results for these tests on the individual orders.    CBC Auto Differential [[id number]]  (Abnormal) Collected:  10/30/19 1957    Specimen:  Blood Updated:  10/30/19 2039     WBC 21.80 10*3/mm3      RBC 4.91 10*6/mm3      Hemoglobin 12.3 g/dL      Hematocrit 42.3 %      MCV 86.2 fL      MCH 25.1 pg      MCHC 29.1 g/dL      RDW 14.5 %      RDW-SD 45.8 fl      MPV 9.3 fL      Platelets 460 10*3/mm3     Scan Slide [[id number]] Collected:  10/30/19 1957    Specimen:  Blood Updated:  10/30/19 2039     Scan Slide --     Comment: See Manual Differential Results       Manual Differential [[id number]]  (Abnormal) Collected:  10/30/19 1957    Specimen:  Blood Updated:  10/30/19 2039     Neutrophil % 35.0 %      Lymphocyte % 56.0 %      Monocyte % 2.0 %   "    Eosinophil % 2.0 %      Bands %  4.0 %      Atypical Lymphocyte % 1.0 %      Neutrophils Absolute 8.50 10*3/mm3      Lymphocytes Absolute 12.21 10*3/mm3      Monocytes Absolute 0.44 10*3/mm3      Eosinophils Absolute 0.44 10*3/mm3      RBC Morphology Normal     WBC Morphology Normal     Platelet Estimate Adequate    Troponin [[id number]]  (Normal) Collected:  10/30/19 1957    Specimen:  Blood Updated:  10/30/19 2035     Troponin T <0.010 ng/mL     Narrative:       Troponin T Reference Range:  <= 0.03 ng/mL-   Negative for AMI  >0.03 ng/mL-     Abnormal for myocardial necrosis.  Clinicians would have to utilize clinical acumen, EKG, Troponin and serial changes to determine if it is an Acute Myocardial Infarction or myocardial injury due to an underlying chronic condition.     Procalcitonin [[id number]]  (Abnormal) Collected:  10/30/19 1957    Specimen:  Blood Updated:  10/30/19 2033     Procalcitonin 8.30 ng/mL     Narrative:       As a Marker for Sepsis (Non-Neonates):   1. <0.5 ng/mL represents a low risk of severe sepsis and/or septic shock.  1. >2 ng/mL represents a high risk of severe sepsis and/or septic shock.    As a Marker for Lower Respiratory Tract Infections that require antibiotic therapy:  PCT on Admission     Antibiotic Therapy             6-12 Hrs later  > 0.5                Strongly Recommended            >0.25 - <0.5         Recommended  0.1 - 0.25           Discouraged                   Remeasure/reassess PCT  <0.1                 Strongly Discouraged          Remeasure/reassess PCT      As 28 day mortality risk marker: \"Change in Procalcitonin Result\" (> 80 % or <=80 %) if Day 0 (or Day 1) and Day 4 values are available. Refer to http://[email or web address]-pct-calculator.com/   Change in PCT <=80 %   A decrease of PCT levels below or equal to 80 % defines a positive change in PCT test result representing a higher risk for 28-day all-cause mortality of patients diagnosed with severe sepsis or septic " shock.  Change in PCT > 80 %   A decrease of PCT levels of more than 80 % defines a negative change in PCT result representing a lower risk for 28-day all-cause mortality of patients diagnosed with severe sepsis or septic shock.                BNP [[id number]]  (Normal) Collected:  10/30/19 1957    Specimen:  Blood Updated:  10/30/19 2033     proBNP 385.2 pg/mL     Narrative:       Among patients with dyspnea, NT-proBNP is highly sensitive for the detection of acute congestive heart failure. In addition NT-proBNP of <300 pg/ml effectively rules out acute congestive heart failure with 99% negative predictive value.    Urinalysis, Microscopic Only - Urine, Clean Catch [[id number]]  (Abnormal) Collected:  10/30/19 2011    Specimen:  Urine, Clean Catch Updated:  10/30/19 2031     RBC, UA 3-5 /HPF      WBC, UA 3-5 /HPF      Bacteria, UA Trace /HPF      Squamous Epithelial Cells, UA None Seen /HPF      Transitional Epithelial Cells, UA 0-2 /HPF      Hyaline Casts, UA None Seen /LPF      Sperm, UA Moderate/2+ /HPF      Mucus, UA Trace /HPF      Methodology Manual Light Microscopy    Urine Drug Screen - Urine, Clean Catch [[id number]]  (Abnormal) Collected:  10/30/19 2011    Specimen:  Urine, Clean Catch Updated:  10/30/19 2030     THC, Screen, Urine Negative     Phencyclidine (PCP), Urine Negative     Cocaine Screen, Urine Negative     Methamphetamine, Ur Positive     Opiate Screen Positive     Amphetamine Screen, Urine Positive     Benzodiazepine Screen, Urine Negative     Tricyclic Antidepressants Screen Negative     Methadone Screen, Urine Negative     Barbiturates Screen, Urine Negative     Oxycodone Screen, Urine Negative     Propoxyphene Screen Negative     Buprenorphine, Screen, Urine Negative    Narrative:       Limitations of this procedure include the possibility of false positives due to interfering substances in the urine sample. Clinical data should be correlated with any questionable result. Positive results  should be considered Presumptive Positive until results are confirmed with another methodology such as HPLC or GCMS.    Urinalysis With Microscopic If Indicated (No Culture) - Urine, Clean Catch [[id number]]  (Abnormal) Collected:  10/30/19 2011    Specimen:  Urine, Clean Catch Updated:  10/30/19 2019     Color, UA Yellow     Appearance, UA Clear     pH, UA 6.0     Specific Gravity, UA 1.018     Glucose,  mg/dL (2+)     Ketones, UA Negative     Bilirubin, UA Negative     Blood, UA Trace     Protein,  mg/dL (2+)     Leuk Esterase, UA Negative     Nitrite, UA Negative     Urobilinogen, UA 0.2 E.U./dL    Ethanol [[id number]] Collected:  10/30/19 1957    Specimen:  Blood Updated:  10/30/19 2019     Ethanol <10 mg/dL      Ethanol % <0.010 %     Narrative:       This result is for medical use only and should not be used for forensic purposes.        Imaging Results (Last 72 Hours)     Procedure Component Value Units Date/Time    XR Chest 1 View [[id number]] Collected:  11/01/19 0836     Updated:  11/01/19 0839    Narrative:       PROCEDURE: XR CHEST 1 VW-     HISTORY: Resp Failure.; J96.01-Acute respiratory failure with hypoxia;  J96.02-Acute respiratory failure with hypercapnia; R79.89-Other  specified abnormal findings of blood chemistry; J69.0-Pneumonitis due to  inhalation of food and vomit; T50.904A-Poisoning by unspecified drugs,  medicaments and biological substances, undetermined, initial encounter;  A41.9-Sepsis, unspecified organism     COMPARISON: 10/31/2019.     FINDINGS: The support tubes and lines are appropriately positioned. The  heart is normal in size. The mediastinum is unremarkable. There is  continued worsening of the patient's bilateral airspace disease. No  significant effusions are evident. There is no pneumothorax.  There are  no acute osseous abnormalities.       Impression:       Worsening bilateral airspace disease with the differential  including ARDS and pneumonia.     Continued  followup is recommended.     This report was finalized on 11/1/2019 8:37 AM by Chris Patricio M.D..    XR Chest 1 View [[id number]] Collected:  10/31/19 1043     Updated:  10/31/19 1047    Narrative:       PROCEDURE: XR CHEST 1 VW-     HISTORY: Central line placement; J96.01-Acute respiratory failure with  hypoxia; J96.02-Acute respiratory failure with hypercapnia; R79.89-Other  specified abnormal findings of blood chemistry; J69.0-Pneumonitis due to  inhalation of food and vomit; T50.904A-Poisoning by unspecified drugs,  medicaments and biological substances, undetermined, initial encounter;  A41.9-Sepsis, unspecified organism     COMPARISON: 10/30/2019.     FINDINGS: There has been interval placement of a left internal jugular  central venous catheter with the tip in the SVC. Endotracheal and  nasogastric tubes are well-positioned. The heart is normal in size. The  mediastinum is unremarkable. There has been interval worsening of the  patient's perihilar airspace disease. There is no pneumothorax.  There  are no acute osseous abnormalities.       Impression:       1. Placement of a left internal jugular central venous catheter with no  evidence of pneumothorax.  2. Worsening bilateral airspace disease.     Continued followup is recommended.     This report was finalized on 10/31/2019 10:45 AM by Crhis Patricio M.D..    XR Chest 1 View [[id number]] Collected:  10/31/19 0725     Updated:  10/31/19 0728    Narrative:       PROCEDURE: XR CHEST 1 VW-     HISTORY: post intubation     COMPARISON: 07/01/2011.     FINDINGS: Endotracheal and nasogastric tubes are in place and are  well-positioned. The heart is normal in size. The mediastinum is  unremarkable. There is left greater than right perihilar opacities  likely reflecting a pneumonia. No significant effusions are evident.  There is no pneumothorax.  There are no acute osseous abnormalities.       Impression:       1. Support tubes appropriately  positioned.  2. Left greater than right perihilar airspace disease consistent with a  pneumonia.     Continued followup is recommended.     This report was finalized on 10/31/2019 7:26 AM by Chris Patricio M.D..    CT Chest Without Contrast [[id number]] Collected:  10/30/19 2208     Updated:  10/30/19 2209    Narrative:       FINAL REPORT    TECHNIQUE:  Routine axial images were obtained from the lung apices to below  the diaphragm without contrast.    CLINICAL HISTORY:  acute resp failure, concern for aspiration/ards    FINDINGS:  There is moderate upper lobe bullous emphysema.  There is  airspace consolidation in the left greater than right lower  lobes, consistent with pneumonia.  Patchy groundglass airspace  opacities are seen elsewhere, consistent with pneumonia as well.  There is no convincing pleural effusion.  There is no  pneumothorax.      Impression:       Bilateral lung opacities, worrisome for pneumonia    Authenticated by Nasir Bañuelos M.D. on 10/30/2019 10:08:54 PM    CT Head Without Contrast [[id number]] Collected:  10/30/19 2208     Updated:  10/30/19 2209    Narrative:       FINAL REPORT    TECHNIQUE:  Routine axial images through the head were obtained without  contrast.    CLINICAL HISTORY:  suspect anoxic brain injury    FINDINGS:  The ventricles are normal.  There is no mass or other abnormal  hypodensity.  There is no shift of midline structures.  There is  no intracranial hemorrhage.  No acute sinus or osseous  abnormality is seen.      Impression:       Unremarkable.    Authenticated by Nasir Bañuelos M.D. on 10/30/2019 10:08:52 PM        No results found for: UTPCR    chlorhexidine 15 mL Mouth/Throat Q12H   enoxaparin 40 mg Subcutaneous Q24H   famotidine 20 mg Intravenous Q12H   insulin regular 0-7 Units Subcutaneous Q6H   lactobacillus acidophilus 1 capsule Oral BID   multivitamin with iron-minerals 15 mL Oral Daily   piperacillin-tazobactam 3.375 g Intravenous Q8H   sodium chloride 10 mL  Intravenous Q12H   sodium chloride 10 mL Intravenous Q12H   sodium chloride 10 mL Intravenous Q12H   sodium chloride 10 mL Intravenous Q12H   vancomycin 1,250 mg Intravenous Q12H   Pharmacy Consult  Does not apply Once       midazolam (VERSED) infusion 1 mg/hr Last Rate: Stopped (11/01/19 0729)   Pharmacy to dose vancomycin     Pharmacy to Dose Zosyn     phenylephrine 0.5-3 mcg/kg/min Last Rate: 0.5 mcg/kg/min (11/01/19 0810)   propofol 5-50 mcg/kg/min Last Rate: 50 mcg/kg/min (11/01/19 0810)   sodium chloride 150 mL/hr Last Rate: 150 mL/hr (11/01/19 0852)       Assessment/Plan:    1. Acute renal failure:  2. Hyperkalemia:  3. Acute respiratory failure with hypoxia and hypercapnia (CMS/[facility])  4. Accidental drug overdose  5. Toxic encephalopathy  6. Sepsis (CMS/[facility])  7. Aspiration pneumonia of both lower lobes due to vomit (CMS/[facility])  8. Tobacco dependence  9. Drug abuse (CMS/[facility])  10. Hyperglycemia      Plan:  · His renal function has been normal, UA does not show any signs of ATN likely a prerenal component.  I will go ahead and recheck a UA and a urine sodium.  · He has fairly good urine output and likely will recover from acute kidney injury as well as hyperkalemia as a volume status is already better and increased urine output.  · Since he has been on pressors blood pressure has been much better and stable.  · Continue with the current treatment plan.  · Recheck BMP around 4 PM.  · Surveillance labs.  · Details were also discussed with the hospitalist service.   · Further recommendations will depend on clinical course of the patient during the current hospitalization.    · I also discussed the details with the nursing staff.  · Rest as ordered.    In closing, I sincerely appreciate opportunity to participate in care of this patient. If I can be of any further assistance with the management of this patient, please don’t hesitate to contact me.    Alex Arora MD, FASRICHARD  11/01/19  9:32 AM    Dictated using  Ct.           No

## 2019-11-02 LAB
ALBUMIN SERPL-MCNC: 2 G/DL (ref 3.5–5.2)
ALBUMIN/GLOB SERPL: 0.5 G/DL
ALP SERPL-CCNC: 135 U/L (ref 39–117)
ALT SERPL W P-5'-P-CCNC: 17 U/L (ref 1–41)
ANION GAP SERPL CALCULATED.3IONS-SCNC: 5.4 MMOL/L (ref 5–15)
ANISOCYTOSIS BLD QL: ABNORMAL
AST SERPL-CCNC: 21 U/L (ref 1–40)
BILIRUB SERPL-MCNC: 0.3 MG/DL (ref 0.2–1.2)
BUN BLD-MCNC: 27 MG/DL (ref 6–20)
BUN/CREAT SERPL: 32.5 (ref 7–25)
CALCIUM SPEC-SCNC: 8.2 MG/DL (ref 8.6–10.5)
CHLORIDE SERPL-SCNC: 114 MMOL/L (ref 98–107)
CO2 SERPL-SCNC: 22.6 MMOL/L (ref 22–29)
CREAT BLD-MCNC: 0.83 MG/DL (ref 0.76–1.27)
DEPRECATED RDW RBC AUTO: 47.8 FL (ref 37–54)
EOSINOPHIL # BLD MANUAL: 0.21 10*3/MM3 (ref 0–0.4)
EOSINOPHIL NFR BLD MANUAL: 2 % (ref 0.3–6.2)
ERYTHROCYTE [DISTWIDTH] IN BLOOD BY AUTOMATED COUNT: 15.8 % (ref 12.3–15.4)
GFR SERPL CREATININE-BSD FRML MDRD: 100 ML/MIN/1.73
GLOBULIN UR ELPH-MCNC: 3.8 GM/DL
GLUCOSE BLD-MCNC: 128 MG/DL (ref 65–99)
GLUCOSE BLDC GLUCOMTR-MCNC: 112 MG/DL (ref 70–130)
GLUCOSE BLDC GLUCOMTR-MCNC: 117 MG/DL (ref 70–130)
GLUCOSE BLDC GLUCOMTR-MCNC: 122 MG/DL (ref 70–130)
HCT VFR BLD AUTO: 30.6 % (ref 37.5–51)
HGB BLD-MCNC: 9.1 G/DL (ref 13–17.7)
LYMPHOCYTES # BLD MANUAL: 1.35 10*3/MM3 (ref 0.7–3.1)
LYMPHOCYTES NFR BLD MANUAL: 13 % (ref 19.6–45.3)
LYMPHOCYTES NFR BLD MANUAL: 4 % (ref 5–12)
MCH RBC QN AUTO: 24.5 PG (ref 26.6–33)
MCHC RBC AUTO-ENTMCNC: 29.7 G/DL (ref 31.5–35.7)
MCV RBC AUTO: 82.5 FL (ref 79–97)
MONOCYTES # BLD AUTO: 0.42 10*3/MM3 (ref 0.1–0.9)
NEUTROPHILS # BLD AUTO: 8.41 10*3/MM3 (ref 1.7–7)
NEUTROPHILS NFR BLD MANUAL: 70 % (ref 42.7–76)
NEUTS BAND NFR BLD MANUAL: 11 % (ref 0–5)
PLATELET # BLD AUTO: 235 10*3/MM3 (ref 140–450)
PMV BLD AUTO: 9.7 FL (ref 6–12)
POTASSIUM BLD-SCNC: 3.9 MMOL/L (ref 3.5–5.2)
PROT SERPL-MCNC: 5.8 G/DL (ref 6–8.5)
RBC # BLD AUTO: 3.71 10*6/MM3 (ref 4.14–5.8)
SCAN SLIDE: NORMAL
SMALL PLATELETS BLD QL SMEAR: ADEQUATE
SODIUM BLD-SCNC: 142 MMOL/L (ref 136–145)
SODIUM UR-SCNC: 57 MMOL/L
WBC MORPH BLD: NORMAL
WBC NRBC COR # BLD: 10.38 10*3/MM3 (ref 3.4–10.8)

## 2019-11-02 PROCEDURE — 94003 VENT MGMT INPAT SUBQ DAY: CPT

## 2019-11-02 PROCEDURE — 99232 SBSQ HOSP IP/OBS MODERATE 35: CPT | Performed by: INTERNAL MEDICINE

## 2019-11-02 PROCEDURE — 25010000002 PROPOFOL 10 MG/ML EMULSION: Performed by: EMERGENCY MEDICINE

## 2019-11-02 PROCEDURE — 80053 COMPREHEN METABOLIC PANEL: CPT | Performed by: INTERNAL MEDICINE

## 2019-11-02 PROCEDURE — 25010000002 ENOXAPARIN PER 10 MG: Performed by: INTERNAL MEDICINE

## 2019-11-02 PROCEDURE — 82962 GLUCOSE BLOOD TEST: CPT

## 2019-11-02 PROCEDURE — 25010000002 PIPERACILLIN SOD-TAZOBACTAM PER 1 G: Performed by: INTERNAL MEDICINE

## 2019-11-02 PROCEDURE — 85007 BL SMEAR W/DIFF WBC COUNT: CPT | Performed by: INTERNAL MEDICINE

## 2019-11-02 PROCEDURE — 84300 ASSAY OF URINE SODIUM: CPT | Performed by: INTERNAL MEDICINE

## 2019-11-02 PROCEDURE — 85025 COMPLETE CBC W/AUTO DIFF WBC: CPT | Performed by: INTERNAL MEDICINE

## 2019-11-02 PROCEDURE — 94799 UNLISTED PULMONARY SVC/PX: CPT

## 2019-11-02 RX ADMIN — ACETAMINOPHEN 650 MG: 650 SOLUTION ORAL at 00:02

## 2019-11-02 RX ADMIN — SODIUM CHLORIDE 150 ML/HR: 9 INJECTION, SOLUTION INTRAVENOUS at 02:21

## 2019-11-02 RX ADMIN — PROPOFOL 30 MCG/KG/MIN: 10 INJECTION, EMULSION INTRAVENOUS at 11:18

## 2019-11-02 RX ADMIN — SODIUM CHLORIDE, PRESERVATIVE FREE 10 ML: 5 INJECTION INTRAVENOUS at 20:47

## 2019-11-02 RX ADMIN — SODIUM CHLORIDE, PRESERVATIVE FREE 10 ML: 5 INJECTION INTRAVENOUS at 21:01

## 2019-11-02 RX ADMIN — SODIUM CHLORIDE, PRESERVATIVE FREE 10 ML: 5 INJECTION INTRAVENOUS at 08:46

## 2019-11-02 RX ADMIN — CHLORHEXIDINE GLUCONATE 0.12% ORAL RINSE 15 ML: 1.2 LIQUID ORAL at 08:44

## 2019-11-02 RX ADMIN — CHLORHEXIDINE GLUCONATE 0.12% ORAL RINSE 15 ML: 1.2 LIQUID ORAL at 20:46

## 2019-11-02 RX ADMIN — PROPOFOL 30 MCG/KG/MIN: 10 INJECTION, EMULSION INTRAVENOUS at 17:01

## 2019-11-02 RX ADMIN — FAMOTIDINE 20 MG: 10 INJECTION, SOLUTION INTRAVENOUS at 08:45

## 2019-11-02 RX ADMIN — SODIUM CHLORIDE, PRESERVATIVE FREE 10 ML: 5 INJECTION INTRAVENOUS at 08:47

## 2019-11-02 RX ADMIN — PROPOFOL 35 MCG/KG/MIN: 10 INJECTION, EMULSION INTRAVENOUS at 04:49

## 2019-11-02 RX ADMIN — SODIUM CHLORIDE, PRESERVATIVE FREE 10 ML: 5 INJECTION INTRAVENOUS at 21:00

## 2019-11-02 RX ADMIN — ENOXAPARIN SODIUM 40 MG: 40 INJECTION SUBCUTANEOUS at 00:13

## 2019-11-02 RX ADMIN — Medication 1 CAPSULE: at 20:46

## 2019-11-02 RX ADMIN — TAZOBACTAM SODIUM AND PIPERACILLIN SODIUM 3.38 G: 375; 3 INJECTION, SOLUTION INTRAVENOUS at 20:46

## 2019-11-02 RX ADMIN — FAMOTIDINE 20 MG: 10 INJECTION, SOLUTION INTRAVENOUS at 20:46

## 2019-11-02 RX ADMIN — Medication 1 CAPSULE: at 08:45

## 2019-11-02 RX ADMIN — TAZOBACTAM SODIUM AND PIPERACILLIN SODIUM 3.38 G: 375; 3 INJECTION, SOLUTION INTRAVENOUS at 11:17

## 2019-11-02 RX ADMIN — TAZOBACTAM SODIUM AND PIPERACILLIN SODIUM 3.38 G: 375; 3 INJECTION, SOLUTION INTRAVENOUS at 03:31

## 2019-11-02 RX ADMIN — MULTIVITAMIN 15 ML: LIQUID ORAL at 08:45

## 2019-11-02 NOTE — PLAN OF CARE
Problem: Skin Injury Risk (Adult)  Goal: Skin Health and Integrity  Outcome: Ongoing (interventions implemented as appropriate)      Problem: Fall Risk (Adult)  Goal: Absence of Fall  Outcome: Outcome(s) achieved Date Met: 11/02/19

## 2019-11-02 NOTE — PROGRESS NOTES
"Nephrology Progress Note.    LOS: 3 days    Patient Care Team:  Shivani Canales APRN as PCP - General (Family Medicine)    Chief Complaint:    Chief Complaint   Patient presents with   • Drug Overdose       Subjective:   Follow up for HOLLY and related issues.  Interval History:   Patient Complaints: none  Patient seen and examined this morning.  Events from last night noted.  Patient still intubated and sedated, increased urine output noted.  Patient's mother is in the room details discussed with her as well.    Objective:    Vital Signs  /76 (BP Location: Left arm, Patient Position: Lying)   Pulse 87   Temp 98.6 °F (37 °C) (Oral)   Resp 24   Ht 170.2 cm (67.01\")   Wt 77.9 kg (171 lb 12.8 oz)   SpO2 95%   BMI 26.90 kg/m²     No intake/output data recorded.    Intake/Output Summary (Last 24 hours) at 11/2/2019 0819  Last data filed at 11/2/2019 0556  Gross per 24 hour   Intake 4532 ml   Output 1800 ml   Net 2732 ml       Physical Exam:  General Appearance: Intubated, no acute distress,   HEENT: Oral mucosa dry, extra occular movements intact. Sclera clear.  Skin: Warm and dry  Neck: supple, no JVD, trachea midline  Lungs:Chest shape is normal. Breath sounds heard bilaterally equally. No crackles, No wheezing.   Heart: regular rate and rhythm. normal S1 and S2, no S3, no rub, peripheral pulses weak but palpable.  Abdomen: Obese, soft, non-tender,  present bowel sounds to auscultation  : no palpable bladder.  Extremities: Trace edema, no cyanosis or clubbing.   Neuro: grossly non focal.     Results Review:   Results from last 7 days   Lab Units 11/02/19  0421 11/01/19  1559 11/01/19  0411 10/31/19  0500 10/30/19  1957   SODIUM mmol/L 142 140 142 141 139   POTASSIUM mmol/L 3.9 4.4 5.6* 3.6 3.9   CHLORIDE mmol/L 114* 112* 113* 109* 99   CO2 mmol/L 22.6 21.4* 22.4 23.3 18.8*   BUN mg/dL 27* 30* 32* 19 18   CREATININE mg/dL 0.83 1.12 1.34* 0.98 1.09   CALCIUM mg/dL 8.2* 7.8* 7.7* 8.0* 9.3   ALBUMIN g/dL " 2.00*  --   --  2.60* 3.60   BILIRUBIN mg/dL 0.3  --   --  0.4 0.2   ALK PHOS U/L 135*  --   --  100 140*   ALT (SGPT) U/L 17  --   --  32 39   AST (SGOT) U/L 21  --   --  38 50*   GLUCOSE mg/dL 128* 113* 101* 117* 315*     Estimated Creatinine Clearance: 123.8 mL/min (by C-G formula based on SCr of 0.83 mg/dL).  Results from last 7 days   Lab Units 10/31/19  0500   MAGNESIUM mg/dL 1.7   PHOSPHORUS mg/dL 2.2*         Results from last 7 days   Lab Units 11/02/19  0421 11/01/19  0411 10/31/19  0500 10/30/19  1957   WBC 10*3/mm3 10.38 16.36* 2.64* 21.80*   HEMOGLOBIN g/dL 9.1* 10.8* 12.0* 12.3*   PLATELETS 10*3/mm3 235 308 267 460*         Brief Urine Lab Results  (Last result in the past 365 days)      Color   Clarity   Blood   Leuk Est   Nitrite   Protein   CREAT   Urine HCG        11/01/19 1522 Yellow Cloudy Negative Negative Negative 30 mg/dL (1+)             No results found for: UTPCR  Imaging Results (Last 24 Hours)     Procedure Component Value Units Date/Time    XR Abdomen KUB [[id number]] Collected:  11/01/19 1239     Updated:  11/01/19 1243    Narrative:       PROCEDURE: XR ABDOMEN KUB-     HISTORY: NG verification so can start tube feedings; J96.01-Acute  respiratory failure with hypoxia; J96.02-Acute respiratory failure with  hypercapnia; R79.89-Other specified abnormal findings of blood  chemistry; J69.0-Pneumonitis due to inhalation of food and vomit;  T50.904A-Poisoning by unspecified drugs, medicaments and biological  substances, undetermined, initial encounter; A41.9-Sepsis, unspecified  or     COMPARISON: None.     FINDINGS: An AP view of the abdomen and pelvis demonstrates a  nasogastric tube with the tip in the antrum of the stomach. The bowel  gas pattern is unremarkable with no evidence of obstruction.       Impression:       Nasogastric tube with the tip in the antrum of the stomach.           This report was finalized on 11/1/2019 12:41 PM by Chris Patricio M.D..    XR Chest 1 View  [[id number]] Collected:  11/01/19 0836     Updated:  11/01/19 0839    Narrative:       PROCEDURE: XR CHEST 1 VW-     HISTORY: Resp Failure.; J96.01-Acute respiratory failure with hypoxia;  J96.02-Acute respiratory failure with hypercapnia; R79.89-Other  specified abnormal findings of blood chemistry; J69.0-Pneumonitis due to  inhalation of food and vomit; T50.904A-Poisoning by unspecified drugs,  medicaments and biological substances, undetermined, initial encounter;  A41.9-Sepsis, unspecified organism     COMPARISON: 10/31/2019.     FINDINGS: The support tubes and lines are appropriately positioned. The  heart is normal in size. The mediastinum is unremarkable. There is  continued worsening of the patient's bilateral airspace disease. No  significant effusions are evident. There is no pneumothorax.  There are  no acute osseous abnormalities.       Impression:       Worsening bilateral airspace disease with the differential  including ARDS and pneumonia.     Continued followup is recommended.     This report was finalized on 11/1/2019 8:37 AM by Chris Patricio M.D..          chlorhexidine 15 mL Mouth/Throat Q12H   enoxaparin 40 mg Subcutaneous Q24H   famotidine 20 mg Intravenous Q12H   insulin regular 0-7 Units Subcutaneous Q6H   lactobacillus acidophilus 1 capsule Oral BID   multivitamin with iron-minerals 15 mL Oral Daily   piperacillin-tazobactam 3.375 g Intravenous Q8H   sodium chloride 10 mL Intravenous Q12H   sodium chloride 10 mL Intravenous Q12H   sodium chloride 10 mL Intravenous Q12H   sodium chloride 10 mL Intravenous Q12H       midazolam (VERSED) infusion 1 mg/hr Last Rate: 1 mg/hr (11/01/19 1838)   Pharmacy to Dose Zosyn     propofol 5-50 mcg/kg/min Last Rate: 35 mcg/kg/min (11/02/19 0449)   sodium chloride 100 mL/hr Last Rate: 150 mL/hr (11/02/19 0221)         Medication Review:   Current Facility-Administered Medications   Medication Dose Route Frequency Provider Last Rate Last Dose   •  acetaminophen (TYLENOL) tablet 650 mg  650 mg Oral Q4H PRN Prasanna Segal MD        Or   • acetaminophen (TYLENOL) 160 MG/5ML solution 650 mg  650 mg Oral Q4H PRN Prasanna Segal MD   650 mg at 11/02/19 0002    Or   • acetaminophen (TYLENOL) suppository 650 mg  650 mg Rectal Q4H PRN Prasanna Segal MD       • chlorhexidine (PERIDEX) 0.12 % solution 15 mL  15 mL Mouth/Throat Q12H Prasanna Segal MD   15 mL at 11/01/19 2033   • dextrose (D50W) 25 g/ 50mL Intravenous Solution 25 g  25 g Intravenous Q15 Min PRN Prasanna Segal MD       • dextrose (GLUTOSE) oral gel 1 tube  1 tube Oral Q15 Min PRN Prasanna Segal MD       • enoxaparin (LOVENOX) syringe 40 mg  40 mg Subcutaneous Q24H Prasanna Segal MD   40 mg at 11/02/19 0013   • famotidine (PEPCID) injection 20 mg  20 mg Intravenous Q12H Prasanna Segal MD   20 mg at 11/01/19 2032   • glucagon (human recombinant) (GLUCAGEN DIAGNOSTIC) injection 1 mg  1 mg Subcutaneous PRN Prasanna Segal MD       • insulin regular (humuLIN R,novoLIN R) injection 0-7 Units  0-7 Units Subcutaneous Q6H Prasanna Segal MD       • labetalol (NORMODYNE,TRANDATE) injection 10 mg  10 mg Intravenous Q4H PRN Prasanna Segal MD       • lactobacillus acidophilus (RISAQUAD) capsule 1 capsule  1 capsule Oral BID Prasanna Segal MD   1 capsule at 11/01/19 2032   • midazolam (VERSED) 0.5 mg/mL in sodium chloride 0.9 % 100 mL infusion  1 mg/hr Intravenous Titrated Prasanna Segal MD 2 mL/hr at 11/01/19 1838 1 mg/hr at 11/01/19 1838   • Morphine sulfate (PF) injection 4 mg  4 mg Intravenous Q2H PRN Prasanna Segal MD   4 mg at 10/30/19 2325   • multivitamin with iron-minerals liquid 15 mL  15 mL Oral Daily Prasanna Segal MD   15 mL at 11/01/19 0904   • ondansetron (ZOFRAN) injection 4 mg  4 mg Intravenous Q6H PRN Prasanna Segal MD       • Pharmacy to Dose Zosyn   Does not apply Continuous PRN Prasanna Segal MD       • piperacillin-tazobactam (ZOSYN) 3.375 g in iso-osmotic dextrose 50 ml (premix)  3.375 g Intravenous Q8H Prasanna Segal,  MD   3.375 g at 11/02/19 0331   • propofol (DIPRIVAN) infusion 10 mg/mL 100 mL  5-50 mcg/kg/min Intravenous Titrated Smooth Powers, DO 15.08 mL/hr at 11/02/19 0449 35 mcg/kg/min at 11/02/19 0449   • sodium chloride 0.9 % flush 10 mL  10 mL Intravenous Q12H Prasanna Segal MD   10 mL at 11/01/19 2100   • sodium chloride 0.9 % flush 10 mL  10 mL Intravenous PRN Prasanna Segal MD       • sodium chloride 0.9 % flush 10 mL  10 mL Intravenous Q12H Bria Tamayo MD   10 mL at 11/01/19 2100   • sodium chloride 0.9 % flush 10 mL  10 mL Intravenous Q12H Bria Tamayo MD   10 mL at 11/01/19 2100   • sodium chloride 0.9 % flush 10 mL  10 mL Intravenous Q12H Bria Tamayo MD   10 mL at 11/01/19 2032   • sodium chloride 0.9 % flush 10 mL  10 mL Intravenous PRN Bria Tamayo MD       • sodium chloride 0.9 % flush 20 mL  20 mL Intravenous PRN Bira Tamayo MD       • sodium chloride 0.9 % infusion  100 mL/hr Intravenous Continuous Stephan Parker,  mL/hr at 11/02/19 0221 150 mL/hr at 11/02/19 0221       Assessment/Plan:    1. Acute renal failure:  2. Hyperkalemia:  3. Acute respiratory failure with hypoxia and hypercapnia (CMS/HCC)  4. Accidental drug overdose  5. Toxic encephalopathy  6. Sepsis (CMS/HCC)  7. Aspiration pneumonia of both lower lobes due to vomit (CMS/HCC)  8. Tobacco dependence  9. Drug abuse (CMS/HCC)  10. Hyperglycemia    Plan:  · Continue with the current treatment plan.  · I will go ahead and decrease the IV fluids to 50 cc an hour blood pressure is much better.  · He has been off the pressors, likely extubation planned in the next 24 hours.  · Details were discussed with the  family in the room.    · Details were also discussed with the hospitalist service.   · Surveillance labs.  · Further recommendations will depend on clinical course of the patient during the current hospitalization.    · I also discussed the details with the nursing staff.  · Rest as ordered.    Alex  MD Ulysses, TOMMYN  11/02/19  8:19 AM    Dictated utilizing Dragon dictation.

## 2019-11-02 NOTE — PROGRESS NOTES
[facility]    [facility]    Name:  Eddie Prieto   Age:  44 y.o.  Sex:  male  :  1975  MRN:  [id number]   Visit Number:  [id number]  Admission Date:  10/30/2019  Date Of Service:  19  Primary Care Physician:  Shivani Canales APRN     LOS: 3 days :  Patient Care Team:  Shivani Canales APRN as PCP - General (Family Medicine):    Chief Complaint:      Patient seen in follow-up for septic shock and drug overdose    Subjective / Interval History:     Patient remains intubated and sedated in the ICU.  He is currently on Versed and propofol.  No pressors.  Sister is at bedside.  No acute events reported from nursing overnight.  Unable to obtain subjective history as patient is sedated.  Sister was updated on patient's condition.  Questions answered.    Review of Systems:     Unable to contribute    Vital Signs:    Temp:  [98.6 °F (37 °C)-100.5 °F (38.1 °C)] 98.6 °F (37 °C)  Heart Rate:  [] 87  Resp:  [24] 24  BP: ()/(50-87) 114/76  FiO2 (%):  [35 %-60 %] 35 %    Intake and output:    I/O last 3 completed shifts:  In: 6991 [I.V.:6031; Other:227; NG/GT:102; IV Piggyback:631]  Out: 1925 [Urine:1925]  No intake/output data recorded.    Physical Examination:    General Appearance:   Intubated and sedated no acute distress   Head:  Atraumatic and normocephalic, without obvious abnormality.   Eyes:          Pinpoint pupils, conjunctivae and sclerae normal, no Icterus. No pallor.    Neck: Supple, trachea midline, no thyromegaly, no carotid bruit.   Lungs:   Chest shape is normal. Breath sounds heard bilaterally equally.  No crackles or wheezing.    Heart:  Normal S1 and S2, no murmur. No JVD   Abdomen:   Normal bowel sounds, no masses, no organomegaly. Soft, non-tender, non-distended, no guarding, no rebound tenderness.   Extremities: no edema, no cyanosis, no clubbing.   Skin:  Multiple chronic sores and ulcers on lower extremities likely from IV drug use    Neurologic:  Intubated and sedated.  Unable to follow commands     Laboratory results:    Results from last 7 days   Lab Units 11/02/19  0421 11/01/19  1559 11/01/19 0411 10/31/19  0500 10/30/19  1957   SODIUM mmol/L 142 140 142 141 139   POTASSIUM mmol/L 3.9 4.4 5.6* 3.6 3.9   CHLORIDE mmol/L 114* 112* 113* 109* 99   CO2 mmol/L 22.6 21.4* 22.4 23.3 18.8*   BUN mg/dL 27* 30* 32* 19 18   CREATININE mg/dL 0.83 1.12 1.34* 0.98 1.09   CALCIUM mg/dL 8.2* 7.8* 7.7* 8.0* 9.3   BILIRUBIN mg/dL 0.3  --   --  0.4 0.2   ALK PHOS U/L 135*  --   --  100 140*   ALT (SGPT) U/L 17  --   --  32 39   AST (SGOT) U/L 21  --   --  38 50*   GLUCOSE mg/dL 128* 113* 101* 117* 315*     Results from last 7 days   Lab Units 11/02/19  0421 11/01/19  0411 10/31/19  0500   WBC 10*3/mm3 10.38 16.36* 2.64*   HEMOGLOBIN g/dL 9.1* 10.8* 12.0*   HEMATOCRIT % 30.6* 37.5 40.3   PLATELETS 10*3/mm3 235 308 267         Results from last 7 days   Lab Units 10/31/19  0500 10/30/19  1957   TROPONIN T ng/mL <0.010 <0.010     Results from last 7 days   Lab Units 10/31/19  1027 10/30/19  2137 10/30/19  2127   BLOODCX   --  No growth at 2 days No growth at 2 days   MRSA SCREEN CX  No Methicillin Resistant Staphylococcus aureus isolated  --   --        I have reviewed the patient's laboratory results.    Radiology results:    Imaging Results (Last 24 Hours)     Procedure Component Value Units Date/Time    XR Abdomen KUB [[id number]] Collected:  11/01/19 1239     Updated:  11/01/19 1243    Narrative:       PROCEDURE: XR ABDOMEN KUB-     HISTORY: NG verification so can start tube feedings; J96.01-Acute  respiratory failure with hypoxia; J96.02-Acute respiratory failure with  hypercapnia; R79.89-Other specified abnormal findings of blood  chemistry; J69.0-Pneumonitis due to inhalation of food and vomit;  T50.904A-Poisoning by unspecified drugs, medicaments and biological  substances, undetermined, initial encounter; A41.9-Sepsis, unspecified  or     COMPARISON:  None.     FINDINGS: An AP view of the abdomen and pelvis demonstrates a  nasogastric tube with the tip in the antrum of the stomach. The bowel  gas pattern is unremarkable with no evidence of obstruction.       Impression:       Nasogastric tube with the tip in the antrum of the stomach.           This report was finalized on 11/1/2019 12:41 PM by Chris Patricio M.D..    XR Chest 1 View [[id number]] Collected:  11/01/19 0836     Updated:  11/01/19 0839    Narrative:       PROCEDURE: XR CHEST 1 VW-     HISTORY: Resp Failure.; J96.01-Acute respiratory failure with hypoxia;  J96.02-Acute respiratory failure with hypercapnia; R79.89-Other  specified abnormal findings of blood chemistry; J69.0-Pneumonitis due to  inhalation of food and vomit; T50.904A-Poisoning by unspecified drugs,  medicaments and biological substances, undetermined, initial encounter;  A41.9-Sepsis, unspecified organism     COMPARISON: 10/31/2019.     FINDINGS: The support tubes and lines are appropriately positioned. The  heart is normal in size. The mediastinum is unremarkable. There is  continued worsening of the patient's bilateral airspace disease. No  significant effusions are evident. There is no pneumothorax.  There are  no acute osseous abnormalities.       Impression:       Worsening bilateral airspace disease with the differential  including ARDS and pneumonia.     Continued followup is recommended.     This report was finalized on 11/1/2019 8:37 AM by Chris Patricio M.D..          I have reviewed the patient's radiology reports.    Medication Review:     I have reviewed the patients active and prn medications.       Acute respiratory failure with hypoxia and hypercapnia (CMS/HCC)    Accidental drug overdose    Toxic encephalopathy    Sepsis (CMS/HCC)    Aspiration pneumonia of both lower lobes due to vomit (CMS/HCC)    Tobacco dependence    Drug abuse (CMS/HCC)    Hyperglycemia      Assessment:    Acute toxic encephalopathy,  present on admission.  Acute hypoxic and hypercapnic respiratory failure secondary to #2, present on admission.  Acute drug overdose with opiates and methamphetamine, present on admission.  Septic Shock secondary to pna, present on admission.  Bilateral lower lobe aspiration pneumonia, present on admission.  Acute kidney injury likely secondary to ATN from hypotension due to septic shock  Hyperglycemia, likely secondary to diabetes, present on admission. - resolved  Hypertensive urgency, present on admission - resolved  Twitching of all 4 extremities, suspect anoxic encephalopathy.  Nicotine dependence.  Recreational drug abuse.  Multiple skin ulcers, present on admission.    Plan:    He remains intubated and sedated with propofol and Versed.  FiO2 has been able to be titrated down significantly to 35%.  Dr. Tamayo consulted and following.  Attempt weaning trial tomorrow.  MRSA surveillance screen was negative.  Have discontinued vancomycin.  We will continue with Zosyn.  Blood cultures have been negative at 48 hours.  Patient has remained off of vasopressors.  BP stable. Goal map greater than 65.  If pressors are needed will prefer levophed now that sinus tachycardia has resolved.  Creatinine has trended down to within normal limits at 0.83.  I suspected I think that this was secondary to ATN from hypotension and septic shock.  We will continue to monitor.  Will decrease maintenance fluids to rate of 100 mL/hour of normal saline.   Continue with tube feeds.    Stephan Parker DO  11/02/19  8:00 AM    Dictated utilizing Dragon dictation.

## 2019-11-02 NOTE — PROGRESS NOTES
Adult Nutrition  Assessment/PES    Patient Name:  Eddie Prieto  YOB: 1975  MRN: [id number]  Admit Date:  10/30/2019    Assessment Date:  11/2/2019    Comments:    Recommend:  1. Continue current NPO diet order while pt is receiving EN.  2. MD managing EN. Pt currently receiving EN formula Isosource 1.5 via NG. Pt is at goal rate of 40 mL/hr providing 1320 kcals, 59.8 gm protein, and 672 mL tube feeding water. Free water flushes 100 mL Q4 hours or six times daily. Pt receiving propofol at 15.08 mL/hr to provide an additional ~398 kcals.   3. Continue with MVI with Iron.     RD to follow pt and available PRN.        Reason for Assessment     Row Name 11/02/19 1058          Reason for Assessment    Reason For Assessment  diagnosis/disease state;TF/PN     Diagnosis  substance use/abuse;metabolic state;infection/sepsis;pulmonary disease;other (see comments) Recreational drug use, Toxic encephalopathy, Sepsis, PNA, Multiple ulcers           Anthropometrics     Row Name 11/02/19 0400          Anthropometrics    Weight  77.9 kg (171 lb 12.8 oz)         Labs/Tests/Procedures/Meds     Row Name 11/02/19 1058          Labs/Procedures/Meds    Lab Results Reviewed  reviewed, pertinent     Lab Results Comments  Low: Alb, Phos High: Cl-, BUN        Medications    Pertinent Medications Reviewed  reviewed             Nutrition Prescription Ordered     Row Name 11/02/19 1059          Nutrition Prescription PO    Current PO Diet  NPO Pt on vent and receiving EN        Nutrition Prescription EN    Enteral Route  NG     Product  Isosource 1.5 (Jevity 1.5)     TF Delivery Method  Continuous     Continuous TF Goal Rate (mL/hr)  40 mL/hr     Continuous TF Current Rate (mL/hr)  20 mL/hr     Continuous TF Goal Volume (mL)  880 mL     Continuous TF Current Volume (mL)  440 mL     Water flush (mL)   100 mL     Water Flush Frequency  Every 4 hours        Propofol Considerations    Propofol (mL/hr)  15.08 mL/hr     Propofol  (Kcal/day)  398.11 Kcal/day         Evaluation of Received Nutrient/Fluid Intake     Row Name 11/02/19 1101          PO Evaluation    Number of Days PO Intake Evaluated  Insufficient Data Pt NPO while receiving EN and on vent        EN Evaluation    Number of Days EN Intake Evaluated  2 days     EN Average Volume Delivered (mL/day)  102 mL/day     % Goal Volume   11.6 %               Problem/Interventions:  Problem 1     Row Name 11/02/19 1102          Nutrition Diagnoses Problem 1    Problem 1  Increased Nutrient Needs     Macronutrient  Kcal;Fluid;Protein     Etiology (related to)  Medical Diagnosis     Infectious Disease  Sepsis     Signs/Symptoms (evidenced by)  Other (comment) Infection         Problem 2     Row Name 11/02/19 1102          Nutrition Diagnoses Problem 2    Problem 2  Inadequate Intake/Infusion     Inadequate Intake Type  Oral     Macronutrient  Kcal;Fluid;Fiber;Protein;Fat;Carbohydrate     Micronutrient  Vitamin;Mineral     Etiology (related to)  MNT for Treatment/Condition     Signs/Symptoms (evidenced by)  NPO     Resolved?  Yes EN started 11/1             Intervention Goal     Row Name 11/02/19 1103          Intervention Goal    General  Meet nutritional needs for age/condition     PO  Other (comment) Continue NPO diet order while pt receives EN and is on vent     TF/PN  Other (comment) MD to manage     Transition  TF to PO     Weight  Maintain weight         Nutrition Intervention     Row Name 11/02/19 1103          Nutrition Intervention    RD/Tech Action  Follow Tx progress;Await begin PO         Nutrition Prescription     Row Name 11/02/19 1103          Nutrition Prescription PO    PO Prescription  Other (comment) Continue NPO diet order while pt receives EN     New PO Prescription Ordered?  No, recommended        Other Orders    Obtain Weight  Daily     Obtain Weight Ordered?  No, recommended     Supplement  Vitamin mineral supplement Continue with MVI     Supplement Ordered?  No,  recommended         Education/Evaluation     Row Name 11/02/19 1104          Education    Education  Education not appropriate at this time     Please explain  Patient intubated        Monitor/Evaluation    Monitor  Per protocol;I&O;Pertinent labs;TF delivery/tolerance;Weight;Skin status           Electronically signed by:  Vida Hill RD  11/02/19 11:04 AM

## 2019-11-03 ENCOUNTER — APPOINTMENT (OUTPATIENT)
Dept: GENERAL RADIOLOGY | Facility: HOSPITAL | Age: 44
End: 2019-11-03

## 2019-11-03 LAB
A-A DO2: 119.3 MMHG
ANION GAP SERPL CALCULATED.3IONS-SCNC: 6.7 MMOL/L (ref 5–15)
ANISOCYTOSIS BLD QL: NORMAL
ARTERIAL PATENCY WRIST A: ABNORMAL
ATMOSPHERIC PRESS: 740 MMHG
BASE EXCESS BLDA CALC-SCNC: 2.5 MMOL/L (ref 0–2)
BASOPHILS # BLD AUTO: 0.07 10*3/MM3 (ref 0–0.2)
BASOPHILS NFR BLD AUTO: 0.5 % (ref 0–1.5)
BDY SITE: ABNORMAL
BUN BLD-MCNC: 21 MG/DL (ref 6–20)
BUN/CREAT SERPL: 31.8 (ref 7–25)
CALCIUM SPEC-SCNC: 8.7 MG/DL (ref 8.6–10.5)
CHLORIDE SERPL-SCNC: 113 MMOL/L (ref 98–107)
CO2 SERPL-SCNC: 25.3 MMOL/L (ref 22–29)
COHGB MFR BLD: 0.7 % (ref 0–2)
CREAT BLD-MCNC: 0.66 MG/DL (ref 0.76–1.27)
DEPRECATED RDW RBC AUTO: 46.1 FL (ref 37–54)
EOSINOPHIL # BLD AUTO: 0.18 10*3/MM3 (ref 0–0.4)
EOSINOPHIL NFR BLD AUTO: 1.2 % (ref 0.3–6.2)
ERYTHROCYTE [DISTWIDTH] IN BLOOD BY AUTOMATED COUNT: 15.6 % (ref 12.3–15.4)
GFR SERPL CREATININE-BSD FRML MDRD: 131 ML/MIN/1.73
GLUCOSE BLD-MCNC: 130 MG/DL (ref 65–99)
GLUCOSE BLDC GLUCOMTR-MCNC: 100 MG/DL (ref 70–130)
GLUCOSE BLDC GLUCOMTR-MCNC: 110 MG/DL (ref 70–130)
GLUCOSE BLDC GLUCOMTR-MCNC: 124 MG/DL (ref 70–130)
GLUCOSE BLDC GLUCOMTR-MCNC: 128 MG/DL (ref 70–130)
HCO3 BLDA-SCNC: 27.7 MMOL/L (ref 22–28)
HCT VFR BLD AUTO: 31.2 % (ref 37.5–51)
HCT VFR BLD CALC: 31 %
HGB BLD-MCNC: 9.6 G/DL (ref 13–17.7)
HGB BLDA-MCNC: 10.1 G/DL (ref 12–18)
HOROWITZ INDEX BLD+IHG-RTO: 35 %
IMM GRANULOCYTES # BLD AUTO: 0.04 10*3/MM3 (ref 0–0.05)
IMM GRANULOCYTES NFR BLD AUTO: 0.3 % (ref 0–0.5)
LYMPHOCYTES # BLD AUTO: 1.45 10*3/MM3 (ref 0.7–3.1)
LYMPHOCYTES NFR BLD AUTO: 9.9 % (ref 19.6–45.3)
MCH RBC QN AUTO: 24.8 PG (ref 26.6–33)
MCHC RBC AUTO-ENTMCNC: 30.8 G/DL (ref 31.5–35.7)
MCV RBC AUTO: 80.6 FL (ref 79–97)
METHGB BLD QL: 0 % (ref 0–1.5)
MODALITY: ABNORMAL
MONOCYTES # BLD AUTO: 0.79 10*3/MM3 (ref 0.1–0.9)
MONOCYTES NFR BLD AUTO: 5.4 % (ref 5–12)
NEUTROPHILS # BLD AUTO: 12.09 10*3/MM3 (ref 1.7–7)
NEUTROPHILS NFR BLD AUTO: 82.7 % (ref 42.7–76)
NOTE: ABNORMAL
NRBC BLD AUTO-RTO: 0 /100 WBC (ref 0–0.2)
OXYHGB MFR BLDV: 95.6 % (ref 94–99)
PCO2 BLDA: 44.7 MM HG (ref 35–45)
PCO2 TEMP ADJ BLD: ABNORMAL MM[HG]
PEEP RESPIRATORY: 6 CM[H2O]
PH BLDA: 7.4 PH UNITS (ref 7.3–7.5)
PH, TEMP CORRECTED: ABNORMAL
PLATELET # BLD AUTO: 273 10*3/MM3 (ref 140–450)
PMV BLD AUTO: 9.7 FL (ref 6–12)
PO2 BLDA: 73.6 MM HG (ref 75–100)
PO2 TEMP ADJ BLD: ABNORMAL MM[HG]
POTASSIUM BLD-SCNC: 3.4 MMOL/L (ref 3.5–5.2)
PROCALCITONIN SERPL-MCNC: 7.46 NG/ML (ref 0.1–0.25)
RBC # BLD AUTO: 3.87 10*6/MM3 (ref 4.14–5.8)
SAO2 % BLDCOA: 96.3 % (ref 94–100)
SET MECH RESP RATE: 24
SMALL PLATELETS BLD QL SMEAR: ADEQUATE
SODIUM BLD-SCNC: 145 MMOL/L (ref 136–145)
SODIUM UR-SCNC: 73 MMOL/L
VENTILATOR MODE: AC
VT ON VENT VENT: 550 ML
WBC MORPH BLD: NORMAL
WBC NRBC COR # BLD: 14.62 10*3/MM3 (ref 3.4–10.8)

## 2019-11-03 PROCEDURE — 94003 VENT MGMT INPAT SUBQ DAY: CPT

## 2019-11-03 PROCEDURE — 85007 BL SMEAR W/DIFF WBC COUNT: CPT | Performed by: INTERNAL MEDICINE

## 2019-11-03 PROCEDURE — 87070 CULTURE OTHR SPECIMN AEROBIC: CPT | Performed by: INTERNAL MEDICINE

## 2019-11-03 PROCEDURE — 36600 WITHDRAWAL OF ARTERIAL BLOOD: CPT

## 2019-11-03 PROCEDURE — 25010000003 POTASSIUM CHLORIDE PER 2 MEQ: Performed by: INTERNAL MEDICINE

## 2019-11-03 PROCEDURE — 25010000002 MORPHINE PER 10 MG: Performed by: INTERNAL MEDICINE

## 2019-11-03 PROCEDURE — 82962 GLUCOSE BLOOD TEST: CPT

## 2019-11-03 PROCEDURE — 99232 SBSQ HOSP IP/OBS MODERATE 35: CPT | Performed by: INTERNAL MEDICINE

## 2019-11-03 PROCEDURE — 85025 COMPLETE CBC W/AUTO DIFF WBC: CPT | Performed by: INTERNAL MEDICINE

## 2019-11-03 PROCEDURE — 25010000002 PROPOFOL 10 MG/ML EMULSION: Performed by: EMERGENCY MEDICINE

## 2019-11-03 PROCEDURE — 25010000002 MIDAZOLAM 50 MG/10ML SOLUTION 10 ML VIAL: Performed by: INTERNAL MEDICINE

## 2019-11-03 PROCEDURE — 87205 SMEAR GRAM STAIN: CPT | Performed by: INTERNAL MEDICINE

## 2019-11-03 PROCEDURE — 84145 PROCALCITONIN (PCT): CPT | Performed by: INTERNAL MEDICINE

## 2019-11-03 PROCEDURE — 83050 HGB METHEMOGLOBIN QUAN: CPT

## 2019-11-03 PROCEDURE — 87077 CULTURE AEROBIC IDENTIFY: CPT | Performed by: INTERNAL MEDICINE

## 2019-11-03 PROCEDURE — 82375 ASSAY CARBOXYHB QUANT: CPT

## 2019-11-03 PROCEDURE — 25010000002 PIPERACILLIN SOD-TAZOBACTAM PER 1 G: Performed by: INTERNAL MEDICINE

## 2019-11-03 PROCEDURE — 87186 SC STD MICRODIL/AGAR DIL: CPT | Performed by: INTERNAL MEDICINE

## 2019-11-03 PROCEDURE — 71045 X-RAY EXAM CHEST 1 VIEW: CPT

## 2019-11-03 PROCEDURE — 80048 BASIC METABOLIC PNL TOTAL CA: CPT | Performed by: INTERNAL MEDICINE

## 2019-11-03 PROCEDURE — 84300 ASSAY OF URINE SODIUM: CPT | Performed by: INTERNAL MEDICINE

## 2019-11-03 PROCEDURE — 94799 UNLISTED PULMONARY SVC/PX: CPT

## 2019-11-03 PROCEDURE — 25010000002 ENOXAPARIN PER 10 MG: Performed by: INTERNAL MEDICINE

## 2019-11-03 PROCEDURE — 82805 BLOOD GASES W/O2 SATURATION: CPT

## 2019-11-03 PROCEDURE — 99291 CRITICAL CARE FIRST HOUR: CPT | Performed by: INTERNAL MEDICINE

## 2019-11-03 RX ORDER — SPIRONOLACTONE 25 MG/1
25 TABLET ORAL ONCE
Status: COMPLETED | OUTPATIENT
Start: 2019-11-03 | End: 2019-11-03

## 2019-11-03 RX ORDER — MORPHINE SULFATE 4 MG/ML
4 INJECTION, SOLUTION INTRAMUSCULAR; INTRAVENOUS EVERY 4 HOURS PRN
Status: DISCONTINUED | OUTPATIENT
Start: 2019-11-03 | End: 2019-11-05

## 2019-11-03 RX ORDER — POTASSIUM CHLORIDE 400 MEQ/1000ML
20 INJECTION, SOLUTION INTRAVENOUS ONCE
Status: COMPLETED | OUTPATIENT
Start: 2019-11-03 | End: 2019-11-03

## 2019-11-03 RX ADMIN — MIDAZOLAM 1 MG/HR: 5 INJECTION INTRAMUSCULAR; INTRAVENOUS at 22:30

## 2019-11-03 RX ADMIN — PROPOFOL 35 MCG/KG/MIN: 10 INJECTION, EMULSION INTRAVENOUS at 00:22

## 2019-11-03 RX ADMIN — Medication 1 CAPSULE: at 20:32

## 2019-11-03 RX ADMIN — MORPHINE SULFATE 4 MG: 4 INJECTION INTRAVENOUS at 11:21

## 2019-11-03 RX ADMIN — ENOXAPARIN SODIUM 40 MG: 40 INJECTION SUBCUTANEOUS at 23:37

## 2019-11-03 RX ADMIN — FAMOTIDINE 20 MG: 10 INJECTION, SOLUTION INTRAVENOUS at 08:02

## 2019-11-03 RX ADMIN — SODIUM CHLORIDE, PRESERVATIVE FREE 10 ML: 5 INJECTION INTRAVENOUS at 20:33

## 2019-11-03 RX ADMIN — MORPHINE SULFATE 4 MG: 4 INJECTION INTRAVENOUS at 20:24

## 2019-11-03 RX ADMIN — SODIUM CHLORIDE, PRESERVATIVE FREE 10 ML: 5 INJECTION INTRAVENOUS at 20:32

## 2019-11-03 RX ADMIN — SODIUM CHLORIDE 50 ML/HR: 9 INJECTION, SOLUTION INTRAVENOUS at 00:29

## 2019-11-03 RX ADMIN — TAZOBACTAM SODIUM AND PIPERACILLIN SODIUM 3.38 G: 375; 3 INJECTION, SOLUTION INTRAVENOUS at 21:00

## 2019-11-03 RX ADMIN — MORPHINE SULFATE 4 MG: 4 INJECTION INTRAVENOUS at 16:43

## 2019-11-03 RX ADMIN — PROPOFOL 30 MCG/KG/MIN: 10 INJECTION, EMULSION INTRAVENOUS at 16:43

## 2019-11-03 RX ADMIN — CHLORHEXIDINE GLUCONATE 0.12% ORAL RINSE 15 ML: 1.2 LIQUID ORAL at 08:02

## 2019-11-03 RX ADMIN — SPIRONOLACTONE 25 MG: 25 TABLET ORAL at 12:29

## 2019-11-03 RX ADMIN — PROPOFOL 50 MCG/KG/MIN: 10 INJECTION, EMULSION INTRAVENOUS at 23:35

## 2019-11-03 RX ADMIN — ACETAMINOPHEN 650 MG: 650 SOLUTION ORAL at 17:11

## 2019-11-03 RX ADMIN — ACETAMINOPHEN 649.6 MG: 650 SOLUTION ORAL at 23:34

## 2019-11-03 RX ADMIN — POTASSIUM CHLORIDE 20 MEQ: 29.8 INJECTION, SOLUTION INTRAVENOUS at 10:14

## 2019-11-03 RX ADMIN — PROPOFOL 30 MCG/KG/MIN: 10 INJECTION, EMULSION INTRAVENOUS at 05:11

## 2019-11-03 RX ADMIN — FAMOTIDINE 20 MG: 10 INJECTION, SOLUTION INTRAVENOUS at 20:32

## 2019-11-03 RX ADMIN — CHLORHEXIDINE GLUCONATE 0.12% ORAL RINSE 15 ML: 1.2 LIQUID ORAL at 20:32

## 2019-11-03 RX ADMIN — Medication 1 CAPSULE: at 08:02

## 2019-11-03 RX ADMIN — PROPOFOL 25 MCG/KG/MIN: 10 INJECTION, EMULSION INTRAVENOUS at 12:23

## 2019-11-03 RX ADMIN — ENOXAPARIN SODIUM 40 MG: 40 INJECTION SUBCUTANEOUS at 00:18

## 2019-11-03 RX ADMIN — TAZOBACTAM SODIUM AND PIPERACILLIN SODIUM 3.38 G: 375; 3 INJECTION, SOLUTION INTRAVENOUS at 11:23

## 2019-11-03 RX ADMIN — MULTIVITAMIN 15 ML: LIQUID ORAL at 08:01

## 2019-11-03 RX ADMIN — TAZOBACTAM SODIUM AND PIPERACILLIN SODIUM 3.38 G: 375; 3 INJECTION, SOLUTION INTRAVENOUS at 04:12

## 2019-11-03 NOTE — PLAN OF CARE
Problem: Patient Care Overview  Goal: Plan of Care Review  Outcome: Ongoing (interventions implemented as appropriate)   11/03/19 0098   Plan of Care Review   Progress no change   Coping/Psychosocial   Plan of Care Reviewed With caregiver   OTHER   Outcome Summary Patient remains on the ventilator in critical condition. Versed gtt was turned off today and propofol with minimal response. Pt. did not tolerate weaning trial. He was very tachypneic. He has never followed commands. A good cough reflex and pupil response noted. Morphine added for pain control. Patient grimaces and withdraws. Bilateral soft wrist restraints remain in place to protect him from pulling out his protective airway. Tube feeding is tolerated well. Urine output is good and Dr. Arora has signed off the case. Pt. continues to have a large amount of secretions coming from tracheal tube. He has been given tylenol for a temp of 101.5 this evening and feels warm to touch. VSS. No ectopy noted. Will continue to monitor.

## 2019-11-03 NOTE — PLAN OF CARE
Problem: Ventilation, Mechanical Invasive (Adult)  Goal: Signs and Symptoms of Listed Potential Problems Will be Absent, Minimized or Managed (Ventilation, Mechanical Invasive)  Outcome: Ongoing (interventions implemented as appropriate)   11/03/19 0958   Goal/Outcome Evaluation   Problems Assessed (Mechanical Ventilation, Invasive) all   Problems Present (Mech Vent, Invasive) immobility;inability to wean;situational response

## 2019-11-03 NOTE — PLAN OF CARE
Problem: Fall Risk (Adult)  Goal: Identify Related Risk Factors and Signs and Symptoms  Outcome: Ongoing (interventions implemented as appropriate)   11/03/19 6878   Fall Risk (Adult)   Related Risk Factors (Fall Risk) environment unfamiliar   Signs and Symptoms (Fall Risk) presence of risk factors

## 2019-11-03 NOTE — PLAN OF CARE
Problem: Pneumonia (Adult)  Goal: Signs and Symptoms of Listed Potential Problems Will be Absent, Minimized or Managed (Pneumonia)  Outcome: Ongoing (interventions implemented as appropriate)   11/03/19 4602   Goal/Outcome Evaluation   Problems Assessed (Pneumonia) all   Problems Present (Pneumonia) fluid/electrolyte imbalance;infection progression;respiratory compromise

## 2019-11-03 NOTE — PLAN OF CARE
Problem: Skin Injury Risk (Adult)  Goal: Skin Health and Integrity  Outcome: Ongoing (interventions implemented as appropriate)   11/03/19 2779   Skin Injury Risk (Adult)   Skin Health and Integrity making progress toward outcome

## 2019-11-03 NOTE — PROGRESS NOTES
"Nephrology Progress Note.    LOS: 4 days    Patient Care Team:  Shivani Canales APRN as PCP - General (Family Medicine)    Chief Complaint:    Chief Complaint   Patient presents with   • Drug Overdose       Subjective:   Follow up for HOLLY and related issues.  Interval History:   Patient Complaints: none  Patient seen and examined this morning.  Events from last night noted.  Patient still intubated and sedated, increased urine output noted.  Patient's mother is in the room details discussed with her as well.    Objective:    Vital Signs  /85 (BP Location: Left arm, Patient Position: Lying)   Pulse 95   Temp 99.2 °F (37.3 °C) (Oral)   Resp 24   Ht 170.2 cm (67.01\")   Wt 78.1 kg (172 lb 3.2 oz)   SpO2 93%   BMI 26.96 kg/m²     I/O this shift:  In: 218 [Other:100; NG/GT:118]  Out: 210 [Urine:210]    Intake/Output Summary (Last 24 hours) at 11/3/2019 0851  Last data filed at 11/3/2019 0800  Gross per 24 hour   Intake 4258 ml   Output 1610 ml   Net 2648 ml       Physical Exam:  General Appearance: Intubated, no acute distress,   HEENT: Oral mucosa dry, extra occular movements intact. Sclera clear.  Skin: Warm and dry  Neck: supple, no JVD, trachea midline  Lungs:Chest shape is normal. Breath sounds heard bilaterally equally. No crackles, No wheezing.   Heart: regular rate and rhythm. normal S1 and S2, no S3, no rub, peripheral pulses weak but palpable.  Abdomen: Obese, soft, non-tender,  present bowel sounds to auscultation  : no palpable bladder.  Extremities: 1+ edema, no cyanosis or clubbing.   Neuro: grossly non focal.     Results Review:   Results from last 7 days   Lab Units 11/03/19  0359 11/02/19  0421 11/01/19  1559  10/31/19  0500 10/30/19  1957   SODIUM mmol/L 145 142 140   < > 141 139   POTASSIUM mmol/L 3.4* 3.9 4.4   < > 3.6 3.9   CHLORIDE mmol/L 113* 114* 112*   < > 109* 99   CO2 mmol/L 25.3 22.6 21.4*   < > 23.3 18.8*   BUN mg/dL 21* 27* 30*   < > 19 18   CREATININE mg/dL 0.66* 0.83 1.12 "   < > 0.98 1.09   CALCIUM mg/dL 8.7 8.2* 7.8*   < > 8.0* 9.3   ALBUMIN g/dL  --  2.00*  --   --  2.60* 3.60   BILIRUBIN mg/dL  --  0.3  --   --  0.4 0.2   ALK PHOS U/L  --  135*  --   --  100 140*   ALT (SGPT) U/L  --  17  --   --  32 39   AST (SGOT) U/L  --  21  --   --  38 50*   GLUCOSE mg/dL 130* 128* 113*   < > 117* 315*    < > = values in this interval not displayed.     Estimated Creatinine Clearance: 156.1 mL/min (A) (by C-G formula based on SCr of 0.66 mg/dL (L)).  Results from last 7 days   Lab Units 10/31/19  0500   MAGNESIUM mg/dL 1.7   PHOSPHORUS mg/dL 2.2*         Results from last 7 days   Lab Units 11/03/19  0359 11/02/19  0421 11/01/19  0411 10/31/19  0500 10/30/19  1957   WBC 10*3/mm3 14.62* 10.38 16.36* 2.64* 21.80*   HEMOGLOBIN g/dL 9.6* 9.1* 10.8* 12.0* 12.3*   PLATELETS 10*3/mm3 273 235 308 267 460*         Brief Urine Lab Results  (Last result in the past 365 days)      Color   Clarity   Blood   Leuk Est   Nitrite   Protein   CREAT   Urine HCG        11/01/19 1522 Yellow Cloudy Negative Negative Negative 30 mg/dL (1+)             No results found for: UTPCR  Imaging Results (Last 24 Hours)     Procedure Component Value Units Date/Time    XR Chest 1 View [[id number]] Updated:  11/03/19 0524          chlorhexidine 15 mL Mouth/Throat Q12H   enoxaparin 40 mg Subcutaneous Q24H   famotidine 20 mg Intravenous Q12H   insulin regular 0-7 Units Subcutaneous Q6H   lactobacillus acidophilus 1 capsule Oral BID   multivitamin with iron-minerals 15 mL Oral Daily   piperacillin-tazobactam 3.375 g Intravenous Q8H   potassium chloride 20 mEq Intravenous Once   sodium chloride 10 mL Intravenous Q12H   sodium chloride 10 mL Intravenous Q12H   sodium chloride 10 mL Intravenous Q12H   sodium chloride 10 mL Intravenous Q12H       midazolam (VERSED) infusion 1 mg/hr Last Rate: 1 mg/hr (11/01/19 4708)   Pharmacy to Dose Zosyn     propofol 5-50 mcg/kg/min Last Rate: 30 mcg/kg/min (11/03/19 0511)   sodium chloride 50  mL/hr Last Rate: 50 mL/hr (11/03/19 0029)         Medication Review:   Current Facility-Administered Medications   Medication Dose Route Frequency Provider Last Rate Last Dose   • acetaminophen (TYLENOL) tablet 650 mg  650 mg Oral Q4H PRN Prasanna Segal MD        Or   • acetaminophen (TYLENOL) 160 MG/5ML solution 650 mg  650 mg Oral Q4H PRN Prasanna Segal MD   650 mg at 11/02/19 0002    Or   • acetaminophen (TYLENOL) suppository 650 mg  650 mg Rectal Q4H PRN Prasanna Segal MD       • chlorhexidine (PERIDEX) 0.12 % solution 15 mL  15 mL Mouth/Throat Q12H Prasanna Segal MD   15 mL at 11/03/19 0802   • dextrose (D50W) 25 g/ 50mL Intravenous Solution 25 g  25 g Intravenous Q15 Min PRN Prasanna Segal MD       • dextrose (GLUTOSE) oral gel 1 tube  1 tube Oral Q15 Min PRN Prasanna Segal MD       • enoxaparin (LOVENOX) syringe 40 mg  40 mg Subcutaneous Q24H Prasanna Segal MD   40 mg at 11/03/19 0018   • famotidine (PEPCID) injection 20 mg  20 mg Intravenous Q12H Prasanna Segal MD   20 mg at 11/03/19 0802   • glucagon (human recombinant) (GLUCAGEN DIAGNOSTIC) injection 1 mg  1 mg Subcutaneous PRN Prasanna Segal MD       • insulin regular (humuLIN R,novoLIN R) injection 0-7 Units  0-7 Units Subcutaneous Q6H Prasanna Segal MD       • labetalol (NORMODYNE,TRANDATE) injection 10 mg  10 mg Intravenous Q4H PRN Prasanna Segal MD       • lactobacillus acidophilus (RISAQUAD) capsule 1 capsule  1 capsule Oral BID Prasanna Segal MD   1 capsule at 11/03/19 0802   • midazolam (VERSED) 0.5 mg/mL in sodium chloride 0.9 % 100 mL infusion  1 mg/hr Intravenous Titrated Prasanna Segal MD 2 mL/hr at 11/01/19 1838 1 mg/hr at 11/01/19 1838   • Morphine sulfate (PF) injection 4 mg  4 mg Intravenous Q2H PRN Prasanna Segal MD   4 mg at 10/30/19 2325   • multivitamin with iron-minerals liquid 15 mL  15 mL Oral Daily Prasanna Segal MD   15 mL at 11/03/19 0801   • ondansetron (ZOFRAN) injection 4 mg  4 mg Intravenous Q6H PRN Prasanna Segal MD       • Pharmacy to Dose  Zosyn   Does not apply Continuous PRN Prasanna Segal MD       • piperacillin-tazobactam (ZOSYN) 3.375 g in iso-osmotic dextrose 50 ml (premix)  3.375 g Intravenous Q8H Prasanna Segal MD   3.375 g at 11/03/19 0412   • potassium chloride 20 mEq in 50 mL IVPB  20 mEq Intravenous Once Stephan Parker DO       • propofol (DIPRIVAN) infusion 10 mg/mL 100 mL  5-50 mcg/kg/min Intravenous Titrated Smooth Powers DO 12.92 mL/hr at 11/03/19 0511 30 mcg/kg/min at 11/03/19 0511   • sodium chloride 0.9 % flush 10 mL  10 mL Intravenous Q12H Prasanna Segal MD   10 mL at 11/02/19 2101   • sodium chloride 0.9 % flush 10 mL  10 mL Intravenous PRN Prasanna Segal MD       • sodium chloride 0.9 % flush 10 mL  10 mL Intravenous Q12H Bria Tamayo MD   10 mL at 11/02/19 2101   • sodium chloride 0.9 % flush 10 mL  10 mL Intravenous Q12H Bria Tamayo MD   10 mL at 11/02/19 2100   • sodium chloride 0.9 % flush 10 mL  10 mL Intravenous Q12H Bria Tamayo MD   10 mL at 11/02/19 2047   • sodium chloride 0.9 % flush 10 mL  10 mL Intravenous PRN Bria Tamayo MD       • sodium chloride 0.9 % flush 20 mL  20 mL Intravenous PRN Bria Tamayo MD       • sodium chloride 0.9 % infusion  50 mL/hr Intravenous Continuous Alex Arora MD, FASN 50 mL/hr at 11/03/19 0029 50 mL/hr at 11/03/19 0029       Assessment/Plan:    1. Acute renal failure:  2. Hyperkalemia:  3. Acute respiratory failure with hypoxia and hypercapnia (CMS/HCC)  4. Accidental drug overdose  5. Toxic encephalopathy  6. Sepsis (CMS/HCC)  7. Aspiration pneumonia of both lower lobes due to vomit (CMS/HCC)  8. Tobacco dependence  9. Drug abuse (CMS/HCC)  10. Hyperglycemia    Plan:  · Continue with the current treatment plan.  · Renal functions back to baseline.  · Low potassium noted we will give 1 dose of Aldactone 25 mg.  He does appear to have some extra volume as well.  · May end up requiring diuretics.  · He has been off the pressors, likely extubation  planned later today.  · Details were discussed with the nursing staff no family in the room.    · Details were also discussed with the hospitalist service.  I will sign off please call if needed  · Surveillance labs.  · Further recommendations will depend on clinical course of the patient during the current hospitalization.    · I also discussed the details with the nursing staff.  · Rest as ordered.    Alex Arora MD, FASRICHARD  11/03/19  8:51 AM    Dictated utilizing Dragon dictation.

## 2019-11-03 NOTE — PLAN OF CARE
Problem: Ventilation, Mechanical Invasive (Adult)  Goal: Signs and Symptoms of Listed Potential Problems Will be Absent, Minimized or Managed (Ventilation, Mechanical Invasive)  Outcome: Ongoing (interventions implemented as appropriate)   11/03/19 0110   Goal/Outcome Evaluation   Problems Assessed (Mechanical Ventilation, Invasive) all   Problems Present (Mech Vent, Invasive) none

## 2019-11-03 NOTE — PROGRESS NOTES
[facility]    Name:  Eddie Prieto   Age:  44 y.o.  Sex:  male  :  1975  MRN:  [id number]   Visit Number:  [id number]  Admission Date:  10/30/2019  Date Of Service:  19  Primary Care Physician:  Shivani Canales APRN     LOS: 4 days :  Patient Care Team:  Shivani Canales APRN as PCP - General (Family Medicine):    Chief Complaint:      Patient seen in follow-up for septic shock and drug overdose    Subjective / Interval History:     This morning patient remained intubated/sedated.  No family was present.  Subjective history was unable to be obtained.  No acute events were reported by nursing.  Tube feeds have been held in preparation for weaning trial later today.  Sedation has been decreased.  No meaningful movement or ability to follow commands at the time of my exam.    Hospital course:  44-year-old male with history of drug abuse was brought to the emergency room by EMS after they were called by someone who was driving the patient around stating that the patient became unresponsive.  The history is obtained from the emergency room physician and the medical record.  The patient was given 2 mg of IV Narcan by EMS with some improvement in his mental status but he was agitated and confused.  When he came to the emergency room he was ashen looking, diaphoretic and cyanotic with minimal respiratory effort.  Patient was given 2 more milligram of Narcan while getting Ambu bag ventilation with minimal response.  His oxygen saturation was 36% in the emergency room and he was subsequently intubated in the emergency room.  Patient required pressors on day of admission due to hypotension.  He was significantly tachycardic and was started on phenylephrine.  This was able to be titrated down over the following day his sinus tachycardia resolved and blood pressure stabilized.  Patient started on broad-spectrum antibiotic's with vancomycin and Zosyn.   Cultures obtained.    Review of Systems:     Unable to obtain    Vital Signs:    Temp:  [98.7 °F (37.1 °C)-99.3 °F (37.4 °C)] 99.3 °F (37.4 °C)  Heart Rate:  [] 97  Resp:  [24-32] 24  BP: (134-169)/(75-97) 155/91  FiO2 (%):  [35 %] 35 %    Intake and output:    I/O last 3 completed shifts:  In: 6173 [I.V.:4213; Other:674; NG/GT:1286]  Out: 2150 [Urine:2150]  I/O this shift:  In: 458 [I.V.:190; Other:100; NG/GT:118; IV Piggyback:50]  Out: 350 [Urine:350]    Physical Examination:    General Appearance:   Intubated/sedated   Head:  Atraumatic and normocephalic, without obvious abnormality.   Eyes:          conjunctivae and sclerae normal, no Icterus. No pallor.    Neck: Supple, trachea midline, no thyromegaly,    Lungs:   Chest shape is normal. Breath sounds heard bilaterally equally.  No crackles or wheezing.    Heart:  Normal S1 and S2, no murmur, No JVD   Abdomen:   Normal bowel sounds, no masses, . Soft,  non-distended,    Extremities: no edema, no cyanosis, no clubbing.   Skin: No bleeding, bruising or rash.  Multiple skin ulcers   Neurologic:  Sedated and unable to follow commands.     Laboratory results:    Results from last 7 days   Lab Units 11/03/19  0359 11/02/19  0421 11/01/19  1559  10/31/19  0500 10/30/19  1957   SODIUM mmol/L 145 142 140   < > 141 139   POTASSIUM mmol/L 3.4* 3.9 4.4   < > 3.6 3.9   CHLORIDE mmol/L 113* 114* 112*   < > 109* 99   CO2 mmol/L 25.3 22.6 21.4*   < > 23.3 18.8*   BUN mg/dL 21* 27* 30*   < > 19 18   CREATININE mg/dL 0.66* 0.83 1.12   < > 0.98 1.09   CALCIUM mg/dL 8.7 8.2* 7.8*   < > 8.0* 9.3   BILIRUBIN mg/dL  --  0.3  --   --  0.4 0.2   ALK PHOS U/L  --  135*  --   --  100 140*   ALT (SGPT) U/L  --  17  --   --  32 39   AST (SGOT) U/L  --  21  --   --  38 50*   GLUCOSE mg/dL 130* 128* 113*   < > 117* 315*    < > = values in this interval not displayed.     Results from last 7 days   Lab Units 11/03/19  0359 11/02/19  0421 11/01/19  0411   WBC 10*3/mm3 14.62* 10.38 16.36*    HEMOGLOBIN g/dL 9.6* 9.1* 10.8*   HEMATOCRIT % 31.2* 30.6* 37.5   PLATELETS 10*3/mm3 273 235 308         Results from last 7 days   Lab Units 10/31/19  0500 10/30/19  1957   TROPONIN T ng/mL <0.010 <0.010     Results from last 7 days   Lab Units 10/31/19  1027 10/30/19  2137 10/30/19  2127   BLOODCX   --  No growth at 3 days No growth at 3 days   MRSA SCREEN CX  No Methicillin Resistant Staphylococcus aureus isolated  --   --        I have reviewed the patient's laboratory results.    Radiology results:    Imaging Results (Last 24 Hours)     Procedure Component Value Units Date/Time    XR Chest 1 View [[id number]] Collected:  11/03/19 1019     Updated:  11/03/19 1053    Narrative:       XR CHEST 1 VW-     HISTORY: Resp Failure.; J96.01-Acute respiratory failure with hypoxia;  J96.02-Acute respiratory failure with hypercapnia; R79.89-Other  specified abnormal findings of blood chemistry; J69.0-Pneumonitis due to  inhalation of food and vomit; T50.904A-Poisoning by unspecified drugs,  medicaments and biological substances, undetermined, initial encounter;  A41.9-Sepsis, unspecified organism     COMPARISON: 2 days prior.     FINDINGS: The heart is normal in size. The mediastinum is unremarkable.  ET tube and NG tube are in stable position as is the left IJ catheter.  Bilateral airspace disease with mild sparing of the apices, left greater  than right, has improved slightly on the right, compared to prior exam.  There is no pneumothorax.  There are no acute osseous abnormalities.  Postsurgical change noted in the cervical spine.       Impression:       1.  Improved airspace disease in the right lung compared to 2 days  prior; stable appearance on the left.  2.  Stable support lines and catheters from prior.     This report was finalized on 11/3/2019 10:51 AM by Aliza Andrea MD.          I have reviewed the patient's radiology reports.    Medication Review:     I have reviewed the patients active and prn medications.        Acute respiratory failure with hypoxia and hypercapnia (CMS/HCC)    Accidental drug overdose    Toxic encephalopathy    Sepsis (CMS/HCC)    Aspiration pneumonia of both lower lobes due to vomit (CMS/HCC)    Tobacco dependence    Drug abuse (CMS/HCC)    Hyperglycemia      Assessment:    Acute toxic encephalopathy, present on admission.  Acute hypoxic and hypercapnic respiratory failure secondary to #2, present on admission.  Acute drug overdose with opiates and methamphetamine, present on admission.  Septic Shock secondary to pna, present on admission.  Bilateral lower lobe aspiration pneumonia, present on admission.  Acute kidney injury likely secondary to ATN from hypotension due to septic shock-resolved  Hyperglycemia, likely secondary to diabetes, present on admission. - resolved  Hypertensive urgency, present on admission - resolved  Nicotine dependence.  Recreational drug abuse.  Multiple skin ulcers, present on admission.    Plan:    Patient remain intubated/sedated with propofol and Versed in the ICU today.  He failed weaning trial secondary to unstable vital signs with tachycardia.  Will attempt weaning trial again tomorrow.  Dr. Tamayo is consulted and following.  Have restarted tube feeds since failed extubation.  Septic shock has resolved.  Vancomycin was discontinued secondary to negative MRSA screen.  We will continue with Zosyn.  Blood cultures without growth at 3 days.  Renal function has resolved.  HOLLY was all likely secondary to ATN from hypotension/septic shock.   Dr. Arora has signed off.  Discontinue maintenance fluids.  Lovenox for DVT prophylaxis and Pepcid for GI prophylaxis.    Stephan Parker DO  11/03/19  2:00 PM    Dictated utilizing Dragon dictation.

## 2019-11-03 NOTE — PROGRESS NOTES
"  CC: Acute Respiratory Failure.     S: Intubated and sedated.  Appears somewhat agitated.    ROS: Could not be obtained as the patient is on mechanical ventilator.    O:Vital signs reviewed.  FiO2: 40%. CVP Line. Day # 4. Vent Day # 4  /86   Pulse 86   Temp 99.2 °F (37.3 °C) (Oral)   Resp 24   Ht 170.2 cm (67.01\")   Wt 78.1 kg (172 lb 3.2 oz)   SpO2 93%   BMI 26.96 kg/m²     Temp (24hrs), Av.9 °F (37.2 °C), Min:98.2 °F (36.8 °C), Max:99.3 °F (37.4 °C)      I & Os reviewed.   Intake/Output       19 0700 - 19 0659 19 0700 - 19 0659    Intake (ml) 4040 218    Output (ml) 1400 350    Net (ml) 2640 -132    Last Weight  78.1 kg (172 lb 3.2 oz)  --          General/Constitutional: Intubated. Sedated.  Eyes: PERRL. 3 mm.   Neck: Supple without JVD. No obvious masses noted. Left IJ CVP line in place.   Cardiovascular: S1 + S2. Regular.    Lungs/Respiratory: Transmitted Breath sounds noted. No wheezing heard. Right > Left crackles noted.  GI/Abdomen: Soft. Bowel sounds positive. No organomegaly noted.  Musculoskeletal/Extremities: Trace edema noted. Gait could not be assessed at this time, as the patient was laying in bed.   Neurologic: Sedated. Detailed exam couldn't be performed due to sedation. As per RN, he became agitated on sedation vacation.    Psych: Could not be performed as he is currently sedated.   Skin: Appeared with diffuse scattered ulcerations over upper and lower extremities.     Labs: Reviewed.   Results from last 7 days   Lab Units 19  0359 19  0421 19  1559  10/31/19  0500 10/30/19  1957   SODIUM mmol/L 145 142 140   < > 141 139   POTASSIUM mmol/L 3.4* 3.9 4.4   < > 3.6 3.9   CHLORIDE mmol/L 113* 114* 112*   < > 109* 99   CO2 mmol/L 25.3 22.6 21.4*   < > 23.3 18.8*   BUN mg/dL 21* 27* 30*   < > 19 18   CREATININE mg/dL 0.66* 0.83 1.12   < > 0.98 1.09   CALCIUM mg/dL 8.7 8.2* 7.8*   < > 8.0* 9.3   BILIRUBIN mg/dL  --  0.3  --   --  0.4 0.2   ALK " PHOS U/L  --  135*  --   --  100 140*   ALT (SGPT) U/L  --  17  --   --  32 39   AST (SGOT) U/L  --  21  --   --  38 50*   GLUCOSE mg/dL 130* 128* 113*   < > 117* 315*    < > = values in this interval not displayed.       Results from last 7 days   Lab Units 10/31/19  0500   MAGNESIUM mg/dL 1.7   PHOSPHORUS mg/dL 2.2*       Results from last 7 days   Lab Units 11/03/19  0359 11/02/19  0421 11/01/19  0411 10/31/19  0500 10/30/19  1957   WBC 10*3/mm3 14.62* 10.38 16.36* 2.64* 21.80*   NEUTROPHIL % % 82.7*  --   --   --   --    HEMOGLOBIN g/dL 9.6* 9.1* 10.8* 12.0* 12.3*   HEMATOCRIT % 31.2* 30.6* 37.5 40.3 42.3   PLATELETS 10*3/mm3 273 235 308 267 460*             Lab Results   Component Value Date    PROCALCITO 7.46 (H) 11/03/2019    PROCALCITO 8.30 (H) 10/30/2019       Lab Results   Component Value Date    PROBNP 385.2 10/30/2019         midazolam (VERSED) infusion 1 mg/hr Last Rate: Stopped (11/03/19 0800)   Pharmacy to Dose Zosyn     propofol 5-50 mcg/kg/min Last Rate: 25 mcg/kg/min (11/03/19 0900)         Micro: As of November 3, 2019   No results found for: RESPCX  Lab Results   Component Value Date    BLOODCX No growth at 3 days 10/30/2019    BLOODCX No growth at 3 days 10/30/2019       Lab Results   Component Value Date    MRSACX  10/31/2019     No Methicillin Resistant Staphylococcus aureus isolated           ABG: Reviewed.  Lab Results   Component Value Date    PHART 7.401 11/03/2019    CWF9RXV 44.7 11/03/2019    PO2ART 73.6 (L) 11/03/2019    HGBBG 10.1 (L) 11/03/2019    D9HWIWKC 96.3 11/03/2019    CARBOXYHGB 0.7 11/03/2019         CXRay: Latest imaging study was reviewed personally.   Imaging Results (Last 24 Hours)     Procedure Component Value Units Date/Time    XR Chest 1 View [[id number]] Collected:  11/03/19 1019     Updated:  11/03/19 1053    Narrative:       XR CHEST 1 VW-     HISTORY: Resp Failure.; J96.01-Acute respiratory failure with hypoxia;  J96.02-Acute respiratory failure with hypercapnia;  R79.89-Other  specified abnormal findings of blood chemistry; J69.0-Pneumonitis due to  inhalation of food and vomit; T50.904A-Poisoning by unspecified drugs,  medicaments and biological substances, undetermined, initial encounter;  A41.9-Sepsis, unspecified organism     COMPARISON: 2 days prior.     FINDINGS: The heart is normal in size. The mediastinum is unremarkable.  ET tube and NG tube are in stable position as is the left IJ catheter.  Bilateral airspace disease with mild sparing of the apices, left greater  than right, has improved slightly on the right, compared to prior exam.  There is no pneumothorax.  There are no acute osseous abnormalities.  Postsurgical change noted in the cervical spine.       Impression:       1.  Improved airspace disease in the right lung compared to 2 days  prior; stable appearance on the left.  2.  Stable support lines and catheters from prior.     This report was finalized on 11/3/2019 10:51 AM by Aliza Andrea MD.            Assessment & Recommendations/Plan:   1.  Acute Respiratory Failure  On mechanical ventilation  Will try extubation trial today.    2.  Left sided ?aspiration? Pneumonia.   Procalcitonin level decreasing.   I am not entirely clear of the sputum culture was ever collected and sent although it is mentioned in the H&P?  Since the patient has been on antibiotics for the past 4 days, the yield of cultures will be extremely poor but nonetheless, we will have the respiratory therapist to collect sputum culture and send it    3.  Respiratory acidosis  Improved.    4.  Septic Shock.  Resolved.     5.  Drug overdose  Toxicology screen was positive for multiple agents.  We will start morphine PRN as he does have history of drug overdose.   Another possibility would be to consider starting Precedex or fentanyl drip.    6.  Acute kidney injury  Likely from septic shock.  Resolved.     7.  Nutrition  Has been able to tolerate the tube feeds.  If the patient fails  extubation, we will restart the tube feeds.    Critical Care time spent in direct patient care: 40 minutes including high complexity decision making to assess, manipulate, and support vital organ system failure in this individual who has impairment of one or more vital organ systems such that there is a high probability of imminent or life threatening deterioration in the patient’s condition. This time excludes other billable procedures. Time does include preparation of documents, review of old records, and direct bedside care.    We have reviewed patient's current orders and changes, if any, have been suggested to primary care team. Plan was also discussed with nursing staff, as necessary.     This document was electronically signed by Bria aTmayo MD on 11/03/19 at 11:00 AM      Dictated utilizing Dragon dictation.

## 2019-11-03 NOTE — PLAN OF CARE
Problem: Patient Care Overview  Goal: Plan of Care Review  Outcome: Ongoing (interventions implemented as appropriate)   11/03/19 6769   Plan of Care Review   Progress no change   Coping/Psychosocial   Plan of Care Reviewed With caregiver       Problem: Ventilation, Mechanical Invasive (Adult)  Goal: Signs and Symptoms of Listed Potential Problems Will be Absent, Minimized or Managed (Ventilation, Mechanical Invasive)  Outcome: Ongoing (interventions implemented as appropriate)   11/03/19 8569   Goal/Outcome Evaluation   Problems Assessed (Mechanical Ventilation, Invasive) gastritis/stress ulcer;mechanical dysfunction   Problems Present (Mech Vent, Invasive) gastritis/stress ulcer;mechanical dysfunction

## 2019-11-04 ENCOUNTER — APPOINTMENT (OUTPATIENT)
Dept: GENERAL RADIOLOGY | Facility: HOSPITAL | Age: 44
End: 2019-11-04

## 2019-11-04 LAB
A-A DO2: 109.2 MMHG
ANION GAP SERPL CALCULATED.3IONS-SCNC: 9 MMOL/L (ref 5–15)
ARTERIAL PATENCY WRIST A: ABNORMAL
ATMOSPHERIC PRESS: 737 MMHG
BACTERIA SPEC AEROBE CULT: NORMAL
BACTERIA SPEC AEROBE CULT: NORMAL
BASE EXCESS BLDA CALC-SCNC: 6.3 MMOL/L (ref 0–2)
BASOPHILS # BLD AUTO: 0.06 10*3/MM3 (ref 0–0.2)
BASOPHILS NFR BLD AUTO: 0.5 % (ref 0–1.5)
BDY SITE: ABNORMAL
BUN BLD-MCNC: 19 MG/DL (ref 6–20)
BUN/CREAT SERPL: 26 (ref 7–25)
CALCIUM SPEC-SCNC: 8.4 MG/DL (ref 8.6–10.5)
CHLORIDE SERPL-SCNC: 110 MMOL/L (ref 98–107)
CO2 SERPL-SCNC: 29 MMOL/L (ref 22–29)
COHGB MFR BLD: 0.8 % (ref 0–2)
CREAT BLD-MCNC: 0.73 MG/DL (ref 0.76–1.27)
DEPRECATED RDW RBC AUTO: 45.5 FL (ref 37–54)
EOSINOPHIL # BLD AUTO: 0.39 10*3/MM3 (ref 0–0.4)
EOSINOPHIL NFR BLD AUTO: 3.5 % (ref 0.3–6.2)
ERYTHROCYTE [DISTWIDTH] IN BLOOD BY AUTOMATED COUNT: 15.4 % (ref 12.3–15.4)
GFR SERPL CREATININE-BSD FRML MDRD: 116 ML/MIN/1.73
GLUCOSE BLD-MCNC: 104 MG/DL (ref 65–99)
GLUCOSE BLDC GLUCOMTR-MCNC: 101 MG/DL (ref 70–130)
GLUCOSE BLDC GLUCOMTR-MCNC: 103 MG/DL (ref 70–130)
GLUCOSE BLDC GLUCOMTR-MCNC: 113 MG/DL (ref 70–130)
GLUCOSE BLDC GLUCOMTR-MCNC: 114 MG/DL (ref 70–130)
GLUCOSE BLDC GLUCOMTR-MCNC: 119 MG/DL (ref 70–130)
HCO3 BLDA-SCNC: 31.5 MMOL/L (ref 22–28)
HCT VFR BLD AUTO: 29.7 % (ref 37.5–51)
HCT VFR BLD CALC: 29.5 %
HGB BLD-MCNC: 9.1 G/DL (ref 13–17.7)
HGB BLDA-MCNC: 9.6 G/DL (ref 12–18)
HOROWITZ INDEX BLD+IHG-RTO: 35 %
IMM GRANULOCYTES # BLD AUTO: 0.06 10*3/MM3 (ref 0–0.05)
IMM GRANULOCYTES NFR BLD AUTO: 0.5 % (ref 0–0.5)
LYMPHOCYTES # BLD AUTO: 1.22 10*3/MM3 (ref 0.7–3.1)
LYMPHOCYTES NFR BLD AUTO: 11.1 % (ref 19.6–45.3)
MCH RBC QN AUTO: 24.5 PG (ref 26.6–33)
MCHC RBC AUTO-ENTMCNC: 30.6 G/DL (ref 31.5–35.7)
MCV RBC AUTO: 80.1 FL (ref 79–97)
METHGB BLD QL: 0.9 % (ref 0–1.5)
MODALITY: ABNORMAL
MONOCYTES # BLD AUTO: 1.25 10*3/MM3 (ref 0.1–0.9)
MONOCYTES NFR BLD AUTO: 11.4 % (ref 5–12)
NEUTROPHILS # BLD AUTO: 8.02 10*3/MM3 (ref 1.7–7)
NEUTROPHILS NFR BLD AUTO: 73 % (ref 42.7–76)
NOTE: ABNORMAL
NRBC BLD AUTO-RTO: 0 /100 WBC (ref 0–0.2)
OXYHGB MFR BLDV: 95.2 % (ref 94–99)
PCO2 BLDA: 47.9 MM HG (ref 35–45)
PCO2 TEMP ADJ BLD: ABNORMAL MM[HG]
PEEP RESPIRATORY: 6 CM[H2O]
PH BLDA: 7.43 PH UNITS (ref 7.3–7.5)
PH, TEMP CORRECTED: ABNORMAL
PLATELET # BLD AUTO: 241 10*3/MM3 (ref 140–450)
PMV BLD AUTO: 9.8 FL (ref 6–12)
PO2 BLDA: 79.4 MM HG (ref 75–100)
PO2 TEMP ADJ BLD: ABNORMAL MM[HG]
POTASSIUM BLD-SCNC: 3.6 MMOL/L (ref 3.5–5.2)
RBC # BLD AUTO: 3.71 10*6/MM3 (ref 4.14–5.8)
SAO2 % BLDCOA: 96.8 % (ref 94–100)
SET MECH RESP RATE: 24
SODIUM BLD-SCNC: 148 MMOL/L (ref 136–145)
VANCOMYCIN TROUGH SERPL-MCNC: <4 MCG/ML (ref 5–20)
VENTILATOR MODE: AC
VT ON VENT VENT: 550 ML
WBC NRBC COR # BLD: 11 10*3/MM3 (ref 3.4–10.8)

## 2019-11-04 PROCEDURE — 36600 WITHDRAWAL OF ARTERIAL BLOOD: CPT

## 2019-11-04 PROCEDURE — 71045 X-RAY EXAM CHEST 1 VIEW: CPT

## 2019-11-04 PROCEDURE — 25010000002 VANCOMYCIN 5 G RECONSTITUTED SOLUTION 5,000 MG VIAL: Performed by: FAMILY MEDICINE

## 2019-11-04 PROCEDURE — 94799 UNLISTED PULMONARY SVC/PX: CPT

## 2019-11-04 PROCEDURE — 82805 BLOOD GASES W/O2 SATURATION: CPT

## 2019-11-04 PROCEDURE — 99232 SBSQ HOSP IP/OBS MODERATE 35: CPT | Performed by: FAMILY MEDICINE

## 2019-11-04 PROCEDURE — 25010000003 POTASSIUM CHLORIDE PER 2 MEQ: Performed by: FAMILY MEDICINE

## 2019-11-04 PROCEDURE — 83050 HGB METHEMOGLOBIN QUAN: CPT

## 2019-11-04 PROCEDURE — 80048 BASIC METABOLIC PNL TOTAL CA: CPT | Performed by: INTERNAL MEDICINE

## 2019-11-04 PROCEDURE — 94003 VENT MGMT INPAT SUBQ DAY: CPT

## 2019-11-04 PROCEDURE — 80202 ASSAY OF VANCOMYCIN: CPT | Performed by: FAMILY MEDICINE

## 2019-11-04 PROCEDURE — 25010000002 HYDRALAZINE PER 20 MG

## 2019-11-04 PROCEDURE — 99291 CRITICAL CARE FIRST HOUR: CPT | Performed by: INTERNAL MEDICINE

## 2019-11-04 PROCEDURE — 25010000002 PIPERACILLIN SOD-TAZOBACTAM PER 1 G: Performed by: INTERNAL MEDICINE

## 2019-11-04 PROCEDURE — 25010000002 FENTANYL CITRATE (PF) 100 MCG/2ML SOLUTION 20 ML VIAL: Performed by: INTERNAL MEDICINE

## 2019-11-04 PROCEDURE — 82375 ASSAY CARBOXYHB QUANT: CPT

## 2019-11-04 PROCEDURE — 25010000002 MORPHINE PER 10 MG: Performed by: INTERNAL MEDICINE

## 2019-11-04 PROCEDURE — 82962 GLUCOSE BLOOD TEST: CPT

## 2019-11-04 PROCEDURE — 87040 BLOOD CULTURE FOR BACTERIA: CPT | Performed by: FAMILY MEDICINE

## 2019-11-04 PROCEDURE — 94770: CPT

## 2019-11-04 PROCEDURE — 25010000002 PROPOFOL 10 MG/ML EMULSION: Performed by: EMERGENCY MEDICINE

## 2019-11-04 PROCEDURE — 85025 COMPLETE CBC W/AUTO DIFF WBC: CPT | Performed by: INTERNAL MEDICINE

## 2019-11-04 RX ORDER — DEXMEDETOMIDINE HYDROCHLORIDE 4 UG/ML
.2-1.5 INJECTION, SOLUTION INTRAVENOUS
Status: DISCONTINUED | OUTPATIENT
Start: 2019-11-04 | End: 2019-11-07

## 2019-11-04 RX ORDER — POTASSIUM CHLORIDE 400 MEQ/1000ML
20 INJECTION, SOLUTION INTRAVENOUS ONCE
Status: COMPLETED | OUTPATIENT
Start: 2019-11-04 | End: 2019-11-04

## 2019-11-04 RX ORDER — SODIUM CHLORIDE FOR INHALATION 0.9 %
VIAL, NEBULIZER (ML) INHALATION
Status: DISPENSED
Start: 2019-11-04 | End: 2019-11-04

## 2019-11-04 RX ORDER — HYDRALAZINE HYDROCHLORIDE 20 MG/ML
INJECTION INTRAMUSCULAR; INTRAVENOUS
Status: COMPLETED
Start: 2019-11-04 | End: 2019-11-04

## 2019-11-04 RX ADMIN — Medication 1 CAPSULE: at 09:08

## 2019-11-04 RX ADMIN — FAMOTIDINE 20 MG: 10 INJECTION, SOLUTION INTRAVENOUS at 09:08

## 2019-11-04 RX ADMIN — SODIUM CHLORIDE, PRESERVATIVE FREE 10 ML: 5 INJECTION INTRAVENOUS at 09:10

## 2019-11-04 RX ADMIN — FENTANYL CITRATE 50 MCG/HR: 50 INJECTION INTRAVENOUS at 17:38

## 2019-11-04 RX ADMIN — MORPHINE SULFATE 4 MG: 4 INJECTION INTRAVENOUS at 16:34

## 2019-11-04 RX ADMIN — SODIUM CHLORIDE, PRESERVATIVE FREE 10 ML: 5 INJECTION INTRAVENOUS at 20:16

## 2019-11-04 RX ADMIN — MORPHINE SULFATE 4 MG: 4 INJECTION INTRAVENOUS at 20:16

## 2019-11-04 RX ADMIN — SODIUM CHLORIDE, PRESERVATIVE FREE 10 ML: 5 INJECTION INTRAVENOUS at 09:09

## 2019-11-04 RX ADMIN — MORPHINE SULFATE 4 MG: 4 INJECTION INTRAVENOUS at 12:52

## 2019-11-04 RX ADMIN — PROPOFOL 35 MCG/KG/MIN: 10 INJECTION, EMULSION INTRAVENOUS at 18:41

## 2019-11-04 RX ADMIN — MORPHINE SULFATE 4 MG: 4 INJECTION INTRAVENOUS at 09:08

## 2019-11-04 RX ADMIN — HYDRALAZINE HYDROCHLORIDE 20 MG: 20 INJECTION INTRAMUSCULAR; INTRAVENOUS at 15:11

## 2019-11-04 RX ADMIN — PROPOFOL 50 MCG/KG/MIN: 10 INJECTION, EMULSION INTRAVENOUS at 03:37

## 2019-11-04 RX ADMIN — CHLORHEXIDINE GLUCONATE 0.12% ORAL RINSE 15 ML: 1.2 LIQUID ORAL at 09:08

## 2019-11-04 RX ADMIN — POTASSIUM CHLORIDE 20 MEQ: 29.8 INJECTION, SOLUTION INTRAVENOUS at 12:58

## 2019-11-04 RX ADMIN — VANCOMYCIN HYDROCHLORIDE 1750 MG: 500 INJECTION, POWDER, LYOPHILIZED, FOR SOLUTION INTRAVENOUS at 13:03

## 2019-11-04 RX ADMIN — DEXMEDETOMIDINE HYDROCHLORIDE 1 MCG/KG/HR: 4 INJECTION, SOLUTION INTRAVENOUS at 13:34

## 2019-11-04 RX ADMIN — ACETAMINOPHEN 650 MG: 650 SOLUTION ORAL at 15:56

## 2019-11-04 RX ADMIN — TAZOBACTAM SODIUM AND PIPERACILLIN SODIUM 3.38 G: 375; 3 INJECTION, SOLUTION INTRAVENOUS at 14:17

## 2019-11-04 RX ADMIN — TAZOBACTAM SODIUM AND PIPERACILLIN SODIUM 3.38 G: 375; 3 INJECTION, SOLUTION INTRAVENOUS at 20:16

## 2019-11-04 RX ADMIN — Medication 1 CAPSULE: at 20:16

## 2019-11-04 RX ADMIN — PROPOFOL 30 MCG/KG/MIN: 10 INJECTION, EMULSION INTRAVENOUS at 12:49

## 2019-11-04 RX ADMIN — DEXMEDETOMIDINE HYDROCHLORIDE 0.5 MCG/KG/HR: 4 INJECTION, SOLUTION INTRAVENOUS at 08:41

## 2019-11-04 RX ADMIN — CHLORHEXIDINE GLUCONATE 0.12% ORAL RINSE 15 ML: 1.2 LIQUID ORAL at 20:16

## 2019-11-04 RX ADMIN — TAZOBACTAM SODIUM AND PIPERACILLIN SODIUM 3.38 G: 375; 3 INJECTION, SOLUTION INTRAVENOUS at 03:24

## 2019-11-04 RX ADMIN — PROPOFOL 45 MCG/KG/MIN: 10 INJECTION, EMULSION INTRAVENOUS at 08:57

## 2019-11-04 RX ADMIN — MULTIVITAMIN 15 ML: LIQUID ORAL at 09:08

## 2019-11-04 RX ADMIN — DEXMEDETOMIDINE HYDROCHLORIDE 1 MCG/KG/HR: 4 INJECTION, SOLUTION INTRAVENOUS at 18:41

## 2019-11-04 RX ADMIN — FAMOTIDINE 20 MG: 10 INJECTION, SOLUTION INTRAVENOUS at 20:16

## 2019-11-04 RX ADMIN — SODIUM CHLORIDE, PRESERVATIVE FREE 10 ML: 5 INJECTION INTRAVENOUS at 00:34

## 2019-11-04 RX ADMIN — MORPHINE SULFATE 4 MG: 4 INJECTION INTRAVENOUS at 13:51

## 2019-11-04 NOTE — PROGRESS NOTES
Adult Nutrition  Assessment/PES    Patient Name:  Eddie Prieto  YOB: 1975  MRN: [id number]  Admit Date:  10/30/2019    Assessment Date:  11/4/2019    Comments: Rec. 1. Continue  NPO. TF currently held for weaning trial. Pulmonologist managing TF, current tube feeding regimen:  Isosource 1.5 via NG. Pt is at goal rate of 40 mL/hr providing 1320 kcals, 59.8 gm protein, and 672 mL tube feeding water. Free water flushes 100 mL Q4 hours or six times daily. Pt receiving propofol at 15.08 mL/hr to provide an additional ~398 kcals.   Rec. 2. Continue with MVI with Iron.   RD to follow pt. Consult RD PRN.         Reason for Assessment     Row Name 11/04/19 1055          Reason for Assessment    Reason For Assessment  TF/PN;follow-up protocol     Diagnosis  substance use/abuse;metabolic state;infection/sepsis;pulmonary disease;other (see comments) Intubated, Recreational drug use, Toxic encephalopathy, Sepsis, PNA, Multiple ulcers     Identified At Risk by Screening Criteria  tube feeding or parenteral nutrition           Anthropometrics     Row Name 11/04/19 0400          Anthropometrics    Weight  76.4 kg (168 lb 6.4 oz)         Labs/Tests/Procedures/Meds     Row Name 11/04/19 1055          Labs/Procedures/Meds    Lab Results Reviewed  reviewed, pertinent     Lab Results Comments  High: Na, Cl, HgbA1c  Low: Cr, Phosphorus, Albumin        Medications    Pertinent Medications Reviewed  reviewed     Pertinent Medications Comments  Propofol, MVI with iron         Physical Findings     Row Name 11/04/19 1058          Physical Findings    Overall Physical Appearance  on ventilator support     Tubes  nasogastric tube           Nutrition Prescription Ordered     Row Name 11/04/19 1058          Nutrition Prescription PO    Current PO Diet  NPO Pt on vent and receiving EN        Nutrition Prescription EN    Enteral Route  NG     Product  Isosource 1.5 (Jevity 1.5)     TF Delivery Method  Continuous      Continuous TF Goal Rate (mL/hr)  40 mL/hr     Continuous TF Current Rate (mL/hr)  0 mL/hr held for weaning trial     Continuous TF Goal Volume (mL)  880 mL     Continuous TF Current Volume (mL)  0 mL     Water flush (mL)   100 mL     Water Flush Frequency  Every 4 hours         Evaluation of Received Nutrient/Fluid Intake     Row Name 11/04/19 1059          PO Evaluation    Number of Days PO Intake Evaluated  Insufficient Data NPO        EN Evaluation    Number of Days EN Intake Evaluated  3 days     EN Average Volume Delivered (mL/day)  733 mL/day     % Goal Volume   83 %     TF Changes  Held for weaning trial               Problem/Interventions:  Problem 1     Row Name 11/04/19 1059          Nutrition Diagnoses Problem 1    Problem 1  Increased Nutrient Needs     Macronutrient  Kcal;Fluid;Protein     Etiology (related to)  Medical Diagnosis     Infectious Disease  Sepsis     Signs/Symptoms (evidenced by)  Other (comment) Infection               Intervention Goal     Row Name 11/04/19 1100          Intervention Goal    General  Meet nutritional needs for age/condition     PO  -- continue NPO while receiving tube feeding     TF/PN  -- MD managing     Transition  TF to PO     Weight  Maintain weight         Nutrition Intervention     Row Name 11/04/19 1100          Nutrition Intervention    RD/Tech Action  Follow Tx progress;Await begin PO         Nutrition Prescription     Row Name 11/04/19 1100          Nutrition Prescription PO    PO Prescription  -- continue NPO while receiving tube feeding        Nutrition Prescription EN    Enteral Prescription  Continue same protocol TF managed per MD        Other Orders    Supplement  -- continue with MVI     Other  continue to monitor/replace electrolytes         Education/Evaluation     Row Name 11/04/19 1101          Education    Education  Education not appropriate at this time     Please explain  Patient intubated        Monitor/Evaluation    Monitor  Per  protocol;I&O;Pertinent labs;Weight           Electronically signed by:  Yu Hidalgo RD  11/04/19 11:02 AM

## 2019-11-04 NOTE — PROGRESS NOTES
Continued Stay Note  [facility]     Patient Name: Eddie Pireto  MRN: [id number]  Today's Date: 11/4/2019    Admit Date: 10/30/2019    Discharge Plan     Row Name 11/04/19 1243       Plan    Plan  Following for discharge planning.        Discharge Codes    No documentation.       Expected Discharge Date and Time     Expected Discharge Date Expected Discharge Time    Nov 6, 2019             Scarlett Acevedo LCSW

## 2019-11-04 NOTE — NURSING NOTE
Unable to complete admission documentation at this time due to being on ventilator and sedation.  Will try again at another time

## 2019-11-04 NOTE — PLAN OF CARE
Problem: Patient Care Overview  Goal: Plan of Care Review  Outcome: Ongoing (interventions implemented as appropriate)      Problem: Ventilation, Mechanical Invasive (Adult)  Goal: Signs and Symptoms of Listed Potential Problems Will be Absent, Minimized or Managed (Ventilation, Mechanical Invasive)  Outcome: Ongoing (interventions implemented as appropriate)   11/04/19 0136   Goal/Outcome Evaluation   Problems Assessed (Mechanical Ventilation, Invasive) ventilator-induced lung injury;artificial airway-induced skin/tissue breakdown;situational response

## 2019-11-04 NOTE — PLAN OF CARE
Problem: Ventilation, Mechanical Invasive (Adult)  Intervention: Promote Early Weaning/Extubation   11/04/19 1619   Safety Management   Medication Review/Management medications reviewed   Coping/Psychosocial Interventions   Environmental Support calm environment promoted;distractions minimized       Goal: Signs and Symptoms of Listed Potential Problems Will be Absent, Minimized or Managed (Ventilation, Mechanical Invasive)  Outcome: Ongoing (interventions implemented as appropriate)   11/04/19 1619   Goal/Outcome Evaluation   Problems Assessed (Mechanical Ventilation, Invasive) artificial airway-induced skin/tissue breakdown;inability to wean;ventilator-induced lung injury   Problems Present (Mech Vent, Invasive) inability to wean

## 2019-11-04 NOTE — PROGRESS NOTES
"  CC: Acute Respiratory Failure.     S: Intubated and sedated.  Failed SBT yesterday due to agitation, tachycardia and tachypnea.     ROS: Could not be obtained as the patient is on mechanical ventilator.    O:Vital signs reviewed.  FiO2: 40%. CVP Line. Day # 5. Vent Day # 5.  /85 (BP Location: Left arm, Patient Position: Lying)   Pulse 85   Temp 99.4 °F (37.4 °C) (Oral)   Resp 24   Ht 170.2 cm (67.01\")   Wt 76.4 kg (168 lb 6.4 oz)   SpO2 94%   BMI 26.37 kg/m²     Temp (24hrs), Av.9 °F (37.7 °C), Min:99 °F (37.2 °C), Max:101.5 °F (38.6 °C)      I & Os reviewed.   Intake/Output       19 0700 - 19 0659    Intake (ml) 1803    Output (ml) 1475    Net (ml) 328    Last Weight  76.4 kg (168 lb 6.4 oz)          General/Constitutional: Intubated. Sedated.  Eyes: PERRL. 2-3 mm.   Neck: Supple without JVD. No obvious masses noted. Left IJ CVP line in place.   Cardiovascular: S1 + S2. Regular.    Lungs/Respiratory: Transmitted Breath sounds noted. No wheezing heard. Right > Left crackles noted.  GI/Abdomen: Soft. Bowel sounds positive. No organomegaly noted.  Musculoskeletal/Extremities: Trace edema noted. Gait could not be assessed at this time, as the patient was laying in bed.   Neurologic: Sedated. Detailed exam couldn't be performed due to sedation. As per RN, he became agitated on sedation vacation.    Psych: Could not be performed as he is currently sedated.   Skin: Appeared with diffuse scattered ulcerations over upper and lower extremities.     Labs: Reviewed.   Results from last 7 days   Lab Units 19  0404 19  0359 19  0421  10/31/19  0500 10/30/19  1957   SODIUM mmol/L 148* 145 142   < > 141 139   POTASSIUM mmol/L 3.6 3.4* 3.9   < > 3.6 3.9   CHLORIDE mmol/L 110* 113* 114*   < > 109* 99   CO2 mmol/L 29.0 25.3 22.6   < > 23.3 18.8*   BUN mg/dL 19 21* 27*   < > 19 18   CREATININE mg/dL 0.73* 0.66* 0.83   < > 0.98 1.09   CALCIUM mg/dL 8.4* 8.7 8.2*   < > 8.0* 9.3 "   BILIRUBIN mg/dL  --   --  0.3  --  0.4 0.2   ALK PHOS U/L  --   --  135*  --  100 140*   ALT (SGPT) U/L  --   --  17  --  32 39   AST (SGOT) U/L  --   --  21  --  38 50*   GLUCOSE mg/dL 104* 130* 128*   < > 117* 315*    < > = values in this interval not displayed.       Results from last 7 days   Lab Units 10/31/19  0500   MAGNESIUM mg/dL 1.7   PHOSPHORUS mg/dL 2.2*       Results from last 7 days   Lab Units 11/04/19  0404 11/03/19  0359 11/02/19  0421 11/01/19  0411 10/31/19  0500   WBC 10*3/mm3 11.00* 14.62* 10.38 16.36* 2.64*   NEUTROPHIL % % 73.0 82.7*  --   --   --    HEMOGLOBIN g/dL 9.1* 9.6* 9.1* 10.8* 12.0*   HEMATOCRIT % 29.7* 31.2* 30.6* 37.5 40.3   PLATELETS 10*3/mm3 241 273 235 308 267             Lab Results   Component Value Date    PROCALCITO 7.46 (H) 11/03/2019    PROCALCITO 8.30 (H) 10/30/2019       Lab Results   Component Value Date    PROBNP 385.2 10/30/2019         midazolam (VERSED) infusion 1 mg/hr Last Rate: 1 mg/hr (11/03/19 2230)   Pharmacy to Dose Zosyn     propofol 5-50 mcg/kg/min Last Rate: 50 mcg/kg/min (11/04/19 0337)         Micro: As of November 4, 2019   No results found for: RESPCX  Lab Results   Component Value Date    BLOODCX No growth at 4 days 10/30/2019    BLOODCX No growth at 4 days 10/30/2019       Lab Results   Component Value Date    MRSACX  10/31/2019     No Methicillin Resistant Staphylococcus aureus isolated           ABG: Reviewed.  Lab Results   Component Value Date    PHART 7.426 11/04/2019    HJK3OST 47.9 (H) 11/04/2019    PO2ART 79.4 11/04/2019    HGBBG 9.6 (L) 11/04/2019    J7FNXIZJ 96.8 11/04/2019    CARBOXYHGB 0.8 11/04/2019         CXRay: Latest imaging study was reviewed personally.   Imaging Results (Last 24 Hours)     Procedure Component Value Units Date/Time    XR Chest 1 View [[id number]] Updated:  11/04/19 0532    XR Chest 1 View [[id number]] Collected:  11/03/19 1019     Updated:  11/03/19 1053    Narrative:       XR CHEST 1 VW-     HISTORY: Resp  Failure.; J96.01-Acute respiratory failure with hypoxia;  J96.02-Acute respiratory failure with hypercapnia; R79.89-Other  specified abnormal findings of blood chemistry; J69.0-Pneumonitis due to  inhalation of food and vomit; T50.904A-Poisoning by unspecified drugs,  medicaments and biological substances, undetermined, initial encounter;  A41.9-Sepsis, unspecified organism     COMPARISON: 2 days prior.     FINDINGS: The heart is normal in size. The mediastinum is unremarkable.  ET tube and NG tube are in stable position as is the left IJ catheter.  Bilateral airspace disease with mild sparing of the apices, left greater  than right, has improved slightly on the right, compared to prior exam.  There is no pneumothorax.  There are no acute osseous abnormalities.  Postsurgical change noted in the cervical spine.       Impression:       1.  Improved airspace disease in the right lung compared to 2 days  prior; stable appearance on the left.  2.  Stable support lines and catheters from prior.     This report was finalized on 11/3/2019 10:51 AM by Aliza Andrea MD.            Assessment & Recommendations/Plan:   1.  Acute Respiratory Failure  On mechanical ventilation.  Will try extubation trial again today.  We will try combination of Precedex and morphine to see if we can extubate the patient.    2.  Left sided ?aspiration? Pneumonia.   Procalcitonin level decreasing.   Will repeat procalcitonin level in the morning.  Sputum culture was ordered.     3.  Respiratory acidosis  Improved.    4.  Septic Shock.  Resolved.     5.  Drug overdose  Toxicology screen was positive for multiple agents.  On Morphine PRN as he does have history of drug overdose.   Will ask nursing staff to use morphine somewhat more regularly, especially when he is intubated.  After extubation however, morphine should be used only as needed.  Will start Precedex drip and stop versed.     6.  Acute kidney injury  Resolved.     7.  Nutrition  Has been  able to tolerate the tube feeds.    Critical Care time spent in direct patient care: 40 minutes including high complexity decision making to assess, manipulate, and support vital organ system failure in this individual who has impairment of one or more vital organ systems such that there is a high probability of imminent or life threatening deterioration in the patient’s condition. This time excludes other billable procedures. Time does include preparation of documents, review of old records, and direct bedside care.    We have reviewed patient's current orders and changes, if any, have been suggested to primary care team. Plan was also discussed with nursing staff, as necessary.     This document was electronically signed by Bria Tamayo MD on 11/04/19 at 8:15 AM      Dictated utilizing Dragon dictation.

## 2019-11-04 NOTE — PROGRESS NOTES
[facility]    Name:  Eddie Prieto   Age:  44 y.o.  Sex:  male  :  1975  MRN:  [id number]   Visit Number:  [id number]  Admission Date:  10/30/2019  Date Of Service:  19  Primary Care Physician:  Shivani Canales APRN     LOS: 5 days :  Patient Care Team:  Shivani Canales APRN as PCP - General (Family Medicine):    Chief Complaint:      Follow-up on septic shock, respiratory failure, drug overdose    Subjective / Interval History:     Patient is currently intubated and sedated on mechanical ventilation.  There was no family or friends at bedside.  No acute events overnight per nursing.  Per pulmonology, attempt to wean today.    Prior work-up:  44-year-old male with history of drug abuse was brought to the emergency room by EMS after they were called by someone who was driving the patient around stating that the patient became unresponsive.  The history is obtained from the emergency room physician and the medical record.  The patient was given 2 mg of IV Narcan by EMS with some improvement in his mental status but he was agitated and confused.  When he came to the emergency room he was ashen looking, diaphoretic and cyanotic with minimal respiratory effort.  Patient was given 2 more milligram of Narcan while getting Ambu bag ventilation with minimal response.  His oxygen saturation was 36% in the emergency room and he was subsequently intubated in the emergency room.  Patient required pressors on day of admission due to hypotension.  He was significantly tachycardic and was started on phenylephrine.  This was able to be titrated down over the following day his sinus tachycardia resolved and blood pressure stabilized.  Patient started on broad-spectrum antibiotic's with vancomycin and Zosyn.  Cultures obtained.    Review of Systems:   Complete review of systems is unobtainable secondary to patient's sedated and intubated status.    Vital  Signs:    Temp:  [99 °F (37.2 °C)-101.5 °F (38.6 °C)] 99.4 °F (37.4 °C)  Heart Rate:  [81-97] 85  Resp:  [24-28] 24  BP: (137-164)/(77-97) 146/85  FiO2 (%):  [35 %-45 %] 45 %    Intake and output:    I/O last 3 completed shifts:  In: 3400 [I.V.:1669; Other:400; NG/GT:1203; IV Piggyback:128]  Out: 2225 [Urine:2225]  No intake/output data recorded.    Physical Examination:    General Appearance:   Sedated with ET tube in place not in any acute distress.   Head:  Atraumatic and normocephalic, without obvious abnormality.   Eyes:          PERRLA, conjunctivae and sclerae normal, no Icterus. No pallor. Extraocular movements are within normal limits.   Neck: Supple, trachea midline, no thyromegaly.   Lungs:   Breath sounds heard bilaterally, diminished on left.  No crackles or wheezing. No Pleural rub or bronchial breathing.   Heart:  Normal S1 and S2, no murmur, no gallop, no rub. No JVD   Abdomen:   Normal bowel sounds, no masses, no organomegaly. Soft, non-tender, non-distended, no guarding, no rebound tenderness.   Extremities: Moves all extremities well, no edema, no cyanosis, no clubbing.   Skin: No bleeding, bruising or rash.  Skin no ulcerations noted upper and lower extremity   Neurologic:  Sedated      Laboratory results:    Results from last 7 days   Lab Units 11/04/19  0404 11/03/19  0359 11/02/19  0421  10/31/19  0500 10/30/19  1957   SODIUM mmol/L 148* 145 142   < > 141 139   POTASSIUM mmol/L 3.6 3.4* 3.9   < > 3.6 3.9   CHLORIDE mmol/L 110* 113* 114*   < > 109* 99   CO2 mmol/L 29.0 25.3 22.6   < > 23.3 18.8*   BUN mg/dL 19 21* 27*   < > 19 18   CREATININE mg/dL 0.73* 0.66* 0.83   < > 0.98 1.09   CALCIUM mg/dL 8.4* 8.7 8.2*   < > 8.0* 9.3   BILIRUBIN mg/dL  --   --  0.3  --  0.4 0.2   ALK PHOS U/L  --   --  135*  --  100 140*   ALT (SGPT) U/L  --   --  17  --  32 39   AST (SGOT) U/L  --   --  21  --  38 50*   GLUCOSE mg/dL 104* 130* 128*   < > 117* 315*    < > = values in this interval not displayed.      Results from last 7 days   Lab Units 11/04/19  0404 11/03/19  0359 11/02/19  0421   WBC 10*3/mm3 11.00* 14.62* 10.38   HEMOGLOBIN g/dL 9.1* 9.6* 9.1*   HEMATOCRIT % 29.7* 31.2* 30.6*   PLATELETS 10*3/mm3 241 273 235         Results from last 7 days   Lab Units 10/31/19  0500 10/30/19  1957   TROPONIN T ng/mL <0.010 <0.010     Results from last 7 days   Lab Units 10/31/19  1027 10/30/19  2137 10/30/19  2127   BLOODCX   --  No growth at 4 days No growth at 4 days   MRSA SCREEN CX  No Methicillin Resistant Staphylococcus aureus isolated  --   --        I have reviewed the patient's laboratory results.    Radiology results:    Imaging Results (Last 24 Hours)     Procedure Component Value Units Date/Time    XR Chest 1 View [[id number]] Collected:  11/04/19 0853     Updated:  11/04/19 0901    Narrative:       SINGLE VIEW CHEST     INDICATION: Respiratory failure.     FINDINGS: Single frontal view chest, compared with 11/03/2019. EKG leads  overlie the chest. Endotracheal tube terminates above the gilles. A left  internal jugular venous catheter and a nasogastric tube remain in place.  Persistence of bilateral multifocal airspace infiltrates appear similar  to slightly improved. No other significant change.       Impression:       Persistence of bilateral infiltrates as described. Continued  follow-up recommended.     This report was finalized on 11/4/2019 8:58 AM by Alejandro Stewart MD.    XR Chest 1 View [[id number]] Collected:  11/03/19 1019     Updated:  11/03/19 1053    Narrative:       XR CHEST 1 VW-     HISTORY: Resp Failure.; J96.01-Acute respiratory failure with hypoxia;  J96.02-Acute respiratory failure with hypercapnia; R79.89-Other  specified abnormal findings of blood chemistry; J69.0-Pneumonitis due to  inhalation of food and vomit; T50.904A-Poisoning by unspecified drugs,  medicaments and biological substances, undetermined, initial encounter;  A41.9-Sepsis, unspecified organism     COMPARISON: 2 days prior.      FINDINGS: The heart is normal in size. The mediastinum is unremarkable.  ET tube and NG tube are in stable position as is the left IJ catheter.  Bilateral airspace disease with mild sparing of the apices, left greater  than right, has improved slightly on the right, compared to prior exam.  There is no pneumothorax.  There are no acute osseous abnormalities.  Postsurgical change noted in the cervical spine.       Impression:       1.  Improved airspace disease in the right lung compared to 2 days  prior; stable appearance on the left.  2.  Stable support lines and catheters from prior.     This report was finalized on 11/3/2019 10:51 AM by Aliza Andrea MD.          I have reviewed the patient's radiology reports.    Medication Review:     I have reviewed the patients active and prn medications.       Acute respiratory failure with hypoxia and hypercapnia (CMS/HCC)    Accidental drug overdose    Toxic encephalopathy    Sepsis (CMS/HCC)    Aspiration pneumonia of both lower lobes due to vomit (CMS/HCC)    Tobacco dependence    Drug abuse (CMS/HCC)    Hyperglycemia      Assessment:    Acute toxic encephalopathy, present on admission.  Acute hypoxic and hypercapnic respiratory failure secondary to #2, present on admission.  Acute drug overdose with opiates and methamphetamine, present on admission.  Septic Shock secondary to pna, present on admission.  Bilateral lower lobe aspiration pneumonia, present on admission.  Acute kidney injury likely secondary to ATN from hypotension due to septic shock-resolved  Hyperglycemia, likely secondary to diabetes, present on admission. - resolved  Hypertensive urgency, present on admission - resolved  Nicotine dependence.  Recreational drug abuse.  Multiple skin ulcers, present on admission.    Plan:    Patient on mechanical ventilation since 10/30/2019.  Has been followed by both hospitalist service, pulmonology, and nephrology.  He had failed weaning trial yesterday with  tachycardia.  Dr. Tamayo plans for another weaning trial today with giving Precedex.  Hold tube feeds.    Did have temp 101.5, and 100.7 overnight.  Vitals otherwise stable.  ABG with PCO2 47.9 and pH 7.42.  BMP with creatinine 0.73 and mild hyponatremia 148.  White blood cell count of 11,000, hemoglobin 9, platelets 241.  Gram stain of sputum with 2+ yeast with hyphae and rare gram-positive cocci in chains.  Pro-Kenny was 7.46 yesterday.  Chest x-ray this morning with bilateral airspace disease with suggestion of improvement.  Blood cultures negative from 10/30.  He is on day 4 of Zosyn.  Vancomycin discontinued secondary to negative MRSA screen.  Euceda for DVT prophylaxis and Pepcid for GI prophylaxis.    With fever overnight, will restart vancomycin and repeat cultures today.  Will follow pulmonology recommendations in regards to weaning and extubation.  Patient will remain in ICU otherwise.  Anticipate another 2 to 3-day stay at the minimum.    Ange Esquivel,   11/04/19  10:45 AM    Dictated utilizing Dragon dictation.

## 2019-11-04 NOTE — PLAN OF CARE
Problem: Ventilation, Mechanical Invasive (Adult)  Goal: Signs and Symptoms of Listed Potential Problems Will be Absent, Minimized or Managed (Ventilation, Mechanical Invasive)  Outcome: Ongoing (interventions implemented as appropriate)   11/04/19 0540   Goal/Outcome Evaluation   Problems Assessed (Mechanical Ventilation, Invasive) all   Problems Present (Mech Vent, Invasive) inability to wean;immobility

## 2019-11-05 ENCOUNTER — APPOINTMENT (OUTPATIENT)
Dept: GENERAL RADIOLOGY | Facility: HOSPITAL | Age: 44
End: 2019-11-05

## 2019-11-05 LAB
A-A DO2: 216.1 MMHG
ANION GAP SERPL CALCULATED.3IONS-SCNC: 9.9 MMOL/L (ref 5–15)
ANISOCYTOSIS BLD QL: NORMAL
ARTERIAL PATENCY WRIST A: POSITIVE
ATMOSPHERIC PRESS: 737 MMHG
BASE EXCESS BLDA CALC-SCNC: 6.7 MMOL/L (ref 0–2)
BASOPHILS # BLD AUTO: 0.08 10*3/MM3 (ref 0–0.2)
BASOPHILS NFR BLD AUTO: 0.8 % (ref 0–1.5)
BDY SITE: ABNORMAL
BUN BLD-MCNC: 25 MG/DL (ref 6–20)
BUN/CREAT SERPL: 33.3 (ref 7–25)
CALCIUM SPEC-SCNC: 8.2 MG/DL (ref 8.6–10.5)
CHLORIDE SERPL-SCNC: 109 MMOL/L (ref 98–107)
CO2 SERPL-SCNC: 28.1 MMOL/L (ref 22–29)
COHGB MFR BLD: 0.8 % (ref 0–2)
CREAT BLD-MCNC: 0.75 MG/DL (ref 0.76–1.27)
DEPRECATED RDW RBC AUTO: 43.3 FL (ref 37–54)
EOSINOPHIL # BLD AUTO: 0.35 10*3/MM3 (ref 0–0.4)
EOSINOPHIL NFR BLD AUTO: 3.6 % (ref 0.3–6.2)
ERYTHROCYTE [DISTWIDTH] IN BLOOD BY AUTOMATED COUNT: 15 % (ref 12.3–15.4)
GFR SERPL CREATININE-BSD FRML MDRD: 113 ML/MIN/1.73
GLUCOSE BLD-MCNC: 109 MG/DL (ref 65–99)
GLUCOSE BLDC GLUCOMTR-MCNC: 103 MG/DL (ref 70–130)
GLUCOSE BLDC GLUCOMTR-MCNC: 117 MG/DL (ref 70–130)
GLUCOSE BLDC GLUCOMTR-MCNC: 119 MG/DL (ref 70–130)
GLUCOSE BLDC GLUCOMTR-MCNC: 124 MG/DL (ref 70–130)
GLUCOSE BLDC GLUCOMTR-MCNC: 97 MG/DL (ref 70–130)
HCO3 BLDA-SCNC: 31.4 MMOL/L (ref 22–28)
HCT VFR BLD AUTO: 27.4 % (ref 37.5–51)
HCT VFR BLD CALC: 30.5 %
HGB BLD-MCNC: 8.6 G/DL (ref 13–17.7)
HGB BLDA-MCNC: 9.9 G/DL (ref 12–18)
HOROWITZ INDEX BLD+IHG-RTO: 50 %
IMM GRANULOCYTES # BLD AUTO: 0.12 10*3/MM3 (ref 0–0.05)
IMM GRANULOCYTES NFR BLD AUTO: 1.2 % (ref 0–0.5)
LYMPHOCYTES # BLD AUTO: 1.78 10*3/MM3 (ref 0.7–3.1)
LYMPHOCYTES NFR BLD AUTO: 18.1 % (ref 19.6–45.3)
MCH RBC QN AUTO: 24.8 PG (ref 26.6–33)
MCHC RBC AUTO-ENTMCNC: 31.4 G/DL (ref 31.5–35.7)
MCV RBC AUTO: 79 FL (ref 79–97)
METHGB BLD QL: 0.6 % (ref 0–1.5)
MODALITY: ABNORMAL
MONOCYTES # BLD AUTO: 1.21 10*3/MM3 (ref 0.1–0.9)
MONOCYTES NFR BLD AUTO: 12.3 % (ref 5–12)
NEUTROPHILS # BLD AUTO: 6.3 10*3/MM3 (ref 1.7–7)
NEUTROPHILS NFR BLD AUTO: 64 % (ref 42.7–76)
NOTE: ABNORMAL
NRBC BLD AUTO-RTO: 0 /100 WBC (ref 0–0.2)
OXYHGB MFR BLDV: 95.3 % (ref 94–99)
PCO2 BLDA: 44.8 MM HG (ref 35–45)
PCO2 TEMP ADJ BLD: ABNORMAL MM[HG]
PEEP RESPIRATORY: 6 CM[H2O]
PH BLDA: 7.45 PH UNITS (ref 7.3–7.5)
PH, TEMP CORRECTED: ABNORMAL
PLATELET # BLD AUTO: 231 10*3/MM3 (ref 140–450)
PMV BLD AUTO: 9.9 FL (ref 6–12)
PO2 BLDA: 80.4 MM HG (ref 75–100)
PO2 TEMP ADJ BLD: ABNORMAL MM[HG]
POTASSIUM BLD-SCNC: 3.9 MMOL/L (ref 3.5–5.2)
PROCALCITONIN SERPL-MCNC: 2.72 NG/ML (ref 0.1–0.25)
RBC # BLD AUTO: 3.47 10*6/MM3 (ref 4.14–5.8)
SAO2 % BLDCOA: 96.7 % (ref 94–100)
SET MECH RESP RATE: 24
SMALL PLATELETS BLD QL SMEAR: ADEQUATE
SODIUM BLD-SCNC: 147 MMOL/L (ref 136–145)
VENTILATOR MODE: AC
VT ON VENT VENT: 550 ML
WBC MORPH BLD: NORMAL
WBC NRBC COR # BLD: 9.84 10*3/MM3 (ref 3.4–10.8)

## 2019-11-05 PROCEDURE — 80048 BASIC METABOLIC PNL TOTAL CA: CPT | Performed by: FAMILY MEDICINE

## 2019-11-05 PROCEDURE — 82962 GLUCOSE BLOOD TEST: CPT

## 2019-11-05 PROCEDURE — 85025 COMPLETE CBC W/AUTO DIFF WBC: CPT | Performed by: FAMILY MEDICINE

## 2019-11-05 PROCEDURE — 94770: CPT

## 2019-11-05 PROCEDURE — 25010000002 VANCOMYCIN 5 G RECONSTITUTED SOLUTION 5,000 MG VIAL: Performed by: FAMILY MEDICINE

## 2019-11-05 PROCEDURE — 25010000002 ENOXAPARIN PER 10 MG: Performed by: INTERNAL MEDICINE

## 2019-11-05 PROCEDURE — 36600 WITHDRAWAL OF ARTERIAL BLOOD: CPT

## 2019-11-05 PROCEDURE — 94799 UNLISTED PULMONARY SVC/PX: CPT

## 2019-11-05 PROCEDURE — 94640 AIRWAY INHALATION TREATMENT: CPT

## 2019-11-05 PROCEDURE — 25010000002 PROPOFOL 10 MG/ML EMULSION: Performed by: EMERGENCY MEDICINE

## 2019-11-05 PROCEDURE — 71045 X-RAY EXAM CHEST 1 VIEW: CPT

## 2019-11-05 PROCEDURE — 83050 HGB METHEMOGLOBIN QUAN: CPT

## 2019-11-05 PROCEDURE — 99232 SBSQ HOSP IP/OBS MODERATE 35: CPT | Performed by: FAMILY MEDICINE

## 2019-11-05 PROCEDURE — 25010000002 PIPERACILLIN SOD-TAZOBACTAM PER 1 G: Performed by: FAMILY MEDICINE

## 2019-11-05 PROCEDURE — 25010000002 FUROSEMIDE PER 20 MG: Performed by: INTERNAL MEDICINE

## 2019-11-05 PROCEDURE — 85007 BL SMEAR W/DIFF WBC COUNT: CPT | Performed by: FAMILY MEDICINE

## 2019-11-05 PROCEDURE — 25010000002 PIPERACILLIN SOD-TAZOBACTAM PER 1 G: Performed by: INTERNAL MEDICINE

## 2019-11-05 PROCEDURE — 99291 CRITICAL CARE FIRST HOUR: CPT | Performed by: INTERNAL MEDICINE

## 2019-11-05 PROCEDURE — 82805 BLOOD GASES W/O2 SATURATION: CPT

## 2019-11-05 PROCEDURE — 84145 PROCALCITONIN (PCT): CPT | Performed by: INTERNAL MEDICINE

## 2019-11-05 PROCEDURE — 94003 VENT MGMT INPAT SUBQ DAY: CPT

## 2019-11-05 PROCEDURE — 82375 ASSAY CARBOXYHB QUANT: CPT

## 2019-11-05 PROCEDURE — 25010000002 FENTANYL CITRATE (PF) 100 MCG/2ML SOLUTION 20 ML VIAL: Performed by: INTERNAL MEDICINE

## 2019-11-05 RX ORDER — POTASSIUM CHLORIDE 1.5 G/1.77G
40 POWDER, FOR SOLUTION ORAL EVERY 6 HOURS
Status: COMPLETED | OUTPATIENT
Start: 2019-11-05 | End: 2019-11-05

## 2019-11-05 RX ORDER — IPRATROPIUM BROMIDE AND ALBUTEROL SULFATE 2.5; .5 MG/3ML; MG/3ML
3 SOLUTION RESPIRATORY (INHALATION)
Status: DISCONTINUED | OUTPATIENT
Start: 2019-11-05 | End: 2019-11-12 | Stop reason: HOSPADM

## 2019-11-05 RX ADMIN — SODIUM CHLORIDE, PRESERVATIVE FREE 10 ML: 5 INJECTION INTRAVENOUS at 20:02

## 2019-11-05 RX ADMIN — PROPOFOL 50 MCG/KG/MIN: 10 INJECTION, EMULSION INTRAVENOUS at 20:35

## 2019-11-05 RX ADMIN — ACETAMINOPHEN 650 MG: 650 SOLUTION ORAL at 07:55

## 2019-11-05 RX ADMIN — PROPOFOL 40 MCG/KG/MIN: 10 INJECTION, EMULSION INTRAVENOUS at 14:44

## 2019-11-05 RX ADMIN — FUROSEMIDE 100 MG: 10 INJECTION, SOLUTION INTRAMUSCULAR; INTRAVENOUS at 08:46

## 2019-11-05 RX ADMIN — DEXMEDETOMIDINE HYDROCHLORIDE 1.5 MCG/KG/HR: 4 INJECTION, SOLUTION INTRAVENOUS at 14:11

## 2019-11-05 RX ADMIN — TAZOBACTAM SODIUM AND PIPERACILLIN SODIUM 4.5 G: 500; 4 INJECTION, SOLUTION INTRAVENOUS at 14:10

## 2019-11-05 RX ADMIN — SODIUM CHLORIDE, PRESERVATIVE FREE 10 ML: 5 INJECTION INTRAVENOUS at 08:10

## 2019-11-05 RX ADMIN — IPRATROPIUM BROMIDE AND ALBUTEROL SULFATE 3 ML: .5; 3 SOLUTION RESPIRATORY (INHALATION) at 18:20

## 2019-11-05 RX ADMIN — SODIUM CHLORIDE, PRESERVATIVE FREE 10 ML: 5 INJECTION INTRAVENOUS at 20:03

## 2019-11-05 RX ADMIN — ACETAMINOPHEN 649.6 MG: 650 SOLUTION ORAL at 20:00

## 2019-11-05 RX ADMIN — FAMOTIDINE 20 MG: 10 INJECTION, SOLUTION INTRAVENOUS at 20:02

## 2019-11-05 RX ADMIN — DEXMEDETOMIDINE HYDROCHLORIDE 1 MCG/KG/HR: 4 INJECTION, SOLUTION INTRAVENOUS at 00:13

## 2019-11-05 RX ADMIN — PROPOFOL 40 MCG/KG/MIN: 10 INJECTION, EMULSION INTRAVENOUS at 00:13

## 2019-11-05 RX ADMIN — DEXMEDETOMIDINE HYDROCHLORIDE 1.5 MCG/KG/HR: 4 INJECTION, SOLUTION INTRAVENOUS at 18:10

## 2019-11-05 RX ADMIN — PROPOFOL 40 MCG/KG/MIN: 10 INJECTION, EMULSION INTRAVENOUS at 10:05

## 2019-11-05 RX ADMIN — FENTANYL CITRATE 250 MCG/HR: 50 INJECTION INTRAVENOUS at 14:11

## 2019-11-05 RX ADMIN — CHLORHEXIDINE GLUCONATE 0.12% ORAL RINSE 15 ML: 1.2 LIQUID ORAL at 08:09

## 2019-11-05 RX ADMIN — FENTANYL CITRATE 200 MCG/HR: 50 INJECTION INTRAVENOUS at 05:11

## 2019-11-05 RX ADMIN — SODIUM CHLORIDE, PRESERVATIVE FREE 10 ML: 5 INJECTION INTRAVENOUS at 00:44

## 2019-11-05 RX ADMIN — FENTANYL CITRATE 300 MCG/HR: 50 INJECTION INTRAVENOUS at 18:47

## 2019-11-05 RX ADMIN — DEXMEDETOMIDINE HYDROCHLORIDE 1.41 MCG/KG/HR: 4 INJECTION, SOLUTION INTRAVENOUS at 22:28

## 2019-11-05 RX ADMIN — PROPOFOL 40 MCG/KG/MIN: 10 INJECTION, EMULSION INTRAVENOUS at 05:11

## 2019-11-05 RX ADMIN — DEXMEDETOMIDINE HYDROCHLORIDE 0.1 MCG/KG/HR: 4 INJECTION, SOLUTION INTRAVENOUS at 06:29

## 2019-11-05 RX ADMIN — IPRATROPIUM BROMIDE AND ALBUTEROL SULFATE 3 ML: .5; 3 SOLUTION RESPIRATORY (INHALATION) at 13:01

## 2019-11-05 RX ADMIN — TAZOBACTAM SODIUM AND PIPERACILLIN SODIUM 4.5 G: 500; 4 INJECTION, SOLUTION INTRAVENOUS at 21:05

## 2019-11-05 RX ADMIN — FENTANYL CITRATE 250 MCG/HR: 50 INJECTION INTRAVENOUS at 10:06

## 2019-11-05 RX ADMIN — VANCOMYCIN HYDROCHLORIDE 1250 MG: 500 INJECTION, POWDER, LYOPHILIZED, FOR SOLUTION INTRAVENOUS at 00:46

## 2019-11-05 RX ADMIN — FAMOTIDINE 20 MG: 10 INJECTION, SOLUTION INTRAVENOUS at 08:10

## 2019-11-05 RX ADMIN — Medication 1 CAPSULE: at 20:02

## 2019-11-05 RX ADMIN — POTASSIUM CHLORIDE 40 MEQ: 1.5 POWDER, FOR SOLUTION ORAL at 14:45

## 2019-11-05 RX ADMIN — VANCOMYCIN HYDROCHLORIDE 1250 MG: 500 INJECTION, POWDER, LYOPHILIZED, FOR SOLUTION INTRAVENOUS at 12:08

## 2019-11-05 RX ADMIN — TAZOBACTAM SODIUM AND PIPERACILLIN SODIUM 3.38 G: 375; 3 INJECTION, SOLUTION INTRAVENOUS at 05:00

## 2019-11-05 RX ADMIN — DEXMEDETOMIDINE HYDROCHLORIDE 1.5 MCG/KG/HR: 4 INJECTION, SOLUTION INTRAVENOUS at 10:07

## 2019-11-05 RX ADMIN — ENOXAPARIN SODIUM 40 MG: 40 INJECTION SUBCUTANEOUS at 00:43

## 2019-11-05 RX ADMIN — CHLORHEXIDINE GLUCONATE 0.12% ORAL RINSE 15 ML: 1.2 LIQUID ORAL at 20:02

## 2019-11-05 RX ADMIN — POTASSIUM CHLORIDE 40 MEQ: 1.5 POWDER, FOR SOLUTION ORAL at 08:20

## 2019-11-05 RX ADMIN — MULTIVITAMIN 15 ML: LIQUID ORAL at 08:09

## 2019-11-05 RX ADMIN — Medication 1 CAPSULE: at 08:09

## 2019-11-05 NOTE — PLAN OF CARE
Problem: Patient Care Overview  Goal: Plan of Care Review  Outcome: Ongoing (interventions implemented as appropriate)   11/05/19 1328   Plan of Care Review   Progress no change   Coping/Psychosocial   Plan of Care Reviewed With (nurse)       Problem: Ventilation, Mechanical Invasive (Adult)  Goal: Signs and Symptoms of Listed Potential Problems Will be Absent, Minimized or Managed (Ventilation, Mechanical Invasive)  Outcome: Ongoing (interventions implemented as appropriate)   11/05/19 1328   Goal/Outcome Evaluation   Problems Assessed (Mechanical Ventilation, Invasive) all   Problems Present (Mech Vent, Invasive) inability to wean

## 2019-11-05 NOTE — PROGRESS NOTES
"  CC: Acute Respiratory Failure.     S: Intubated and sedated.  Failed SBT yesterday again due to agitation, tachycardia and tachypnea. He was started on Fentanyl drip on 19 around 5 PM. Seems to be a little less agitated today?    ROS: Could not be obtained as the patient is on mechanical ventilator.    O:Vital signs reviewed.  FiO2: 50%. CVP Line. Day # 6. Vent Day # 6.  /65   Pulse 82   Temp (!) 101.5 °F (38.6 °C) (Oral)   Resp 22   Ht 170.2 cm (67.01\")   Wt 76.4 kg (168 lb 6.4 oz)   SpO2 93%   BMI 26.37 kg/m²     Temp (24hrs), Av.3 °F (37.9 °C), Min:99.2 °F (37.3 °C), Max:101.8 °F (38.8 °C)      I & Os reviewed.   Intake/Output       19 0700 - 19 0659    Intake (ml) 3565    Output (ml) 1575    Net (ml)           General/Constitutional: Intubated. Sedated.  Eyes: PERRL. 2-3 mm.   Neck: Supple without JVD. No obvious masses noted. Left IJ CVP line in place.   Cardiovascular: S1 + S2. Regular.    Lungs/Respiratory: Transmitted Breath sounds noted. No wheezing heard. Right > Left crackles noted.  GI/Abdomen: Soft. Bowel sounds positive. No organomegaly noted.  Musculoskeletal/Extremities: Trace edema noted. Gait could not be assessed at this time, as the patient was laying in bed.   Neurologic: Sedated. Detailed exam couldn't be performed due to sedation.    Psych: Could not be performed as he is currently sedated.   Skin: Appeared with diffuse scattered ulcerations over upper and lower extremities.     Labs: Reviewed.   Results from last 7 days   Lab Units 19  0439 19  0404 19  0359 19  0421  10/31/19  0500 10/30/19  1957   SODIUM mmol/L 147* 148* 145 142   < > 141 139   POTASSIUM mmol/L 3.9 3.6 3.4* 3.9   < > 3.6 3.9   CHLORIDE mmol/L 109* 110* 113* 114*   < > 109* 99   CO2 mmol/L 28.1 29.0 25.3 22.6   < > 23.3 18.8*   BUN mg/dL 25* 19 21* 27*   < > 19 18   CREATININE mg/dL 0.75* 0.73* 0.66* 0.83   < > 0.98 1.09   CALCIUM mg/dL 8.2* 8.4* 8.7 8.2*   < > " 8.0* 9.3   BILIRUBIN mg/dL  --   --   --  0.3  --  0.4 0.2   ALK PHOS U/L  --   --   --  135*  --  100 140*   ALT (SGPT) U/L  --   --   --  17  --  32 39   AST (SGOT) U/L  --   --   --  21  --  38 50*   GLUCOSE mg/dL 109* 104* 130* 128*   < > 117* 315*    < > = values in this interval not displayed.       Results from last 7 days   Lab Units 10/31/19  0500   MAGNESIUM mg/dL 1.7   PHOSPHORUS mg/dL 2.2*       Results from last 7 days   Lab Units 11/05/19  0439 11/04/19  0404 11/03/19  0359 11/02/19  0421 11/01/19  0411   WBC 10*3/mm3 9.84 11.00* 14.62* 10.38 16.36*   NEUTROPHIL % % 64.0 73.0 82.7*  --   --    HEMOGLOBIN g/dL 8.6* 9.1* 9.6* 9.1* 10.8*   HEMATOCRIT % 27.4* 29.7* 31.2* 30.6* 37.5   PLATELETS 10*3/mm3 231 241 273 235 308             Lab Results   Component Value Date    PROCALCITO 2.72 (H) 11/05/2019    PROCALCITO 7.46 (H) 11/03/2019    PROCALCITO 8.30 (H) 10/30/2019       Lab Results   Component Value Date    PROBNP 385.2 10/30/2019         dexmedetomidine 0.2-1.5 mcg/kg/hr Last Rate: 0.1 mcg/kg/hr (11/05/19 0629)   fentaNYL (SUBLIMAZE) infusion 10 mcg/ml  mcg/hr Last Rate: 200 mcg/hr (11/05/19 0511)   Pharmacy to dose vancomycin     Pharmacy to Dose Zosyn     propofol 5-50 mcg/kg/min Last Rate: 40 mcg/kg/min (11/05/19 0511)         Micro: As of November 5, 2019   Lab Results   Component Value Date    RESPCX Scant growth (1+) Gram Negative Bacilli (A) 11/03/2019     Lab Results   Component Value Date    BLOODCX No growth at less than 24 hours 11/04/2019    BLOODCX No growth at less than 24 hours 11/04/2019       Lab Results   Component Value Date    MRSACX  10/31/2019     No Methicillin Resistant Staphylococcus aureus isolated       ABG: Reviewed.  Lab Results   Component Value Date    PHART 7.454 11/05/2019    QMQ4GTN 44.8 11/05/2019    PO2ART 80.4 11/05/2019    HGBBG 9.9 (L) 11/05/2019    X7JMVOGF 96.7 11/05/2019    CARBOXYHGB 0.8 11/05/2019       CXRay: Latest imaging study was reviewed  personally.   Imaging Results (Last 24 Hours)     Procedure Component Value Units Date/Time    XR Chest 1 View [[id number]] Updated:  11/05/19 0622    XR Chest 1 View [[id number]] Collected:  11/04/19 0853     Updated:  11/04/19 0901    Narrative:       SINGLE VIEW CHEST     INDICATION: Respiratory failure.     FINDINGS: Single frontal view chest, compared with 11/03/2019. EKG leads  overlie the chest. Endotracheal tube terminates above the gilles. A left  internal jugular venous catheter and a nasogastric tube remain in place.  Persistence of bilateral multifocal airspace infiltrates appear similar  to slightly improved. No other significant change.       Impression:       Persistence of bilateral infiltrates as described. Continued  follow-up recommended.     This report was finalized on 11/4/2019 8:58 AM by Alejandro Stewart MD.            Assessment & Recommendations/Plan:   1.  Acute Respiratory Failure  On mechanical ventilation.  Will decrease the RR to 20/minute and TV to 500.   Will try extubation trial again later today.  On Precedex, Fentanyl and Propofol.     2.  Left sided ?aspiration? Pneumonia.   Procalcitonin level decreasing.    Will repeat procalcitonin level in 48 hours or so.  Sputum culture showing Gram Neg Bacilli.     3.  Respiratory acidosis  Improved.    4.  Septic Shock.  Resolved.     5.  Drug overdose  Toxicology screen was positive for multiple agents.    6.  Anemia  Likely dilutional.   S/P Lasix IV drip.     7.  Nutrition  Has been able to tolerate the tube feeds.  Will hold for now till we can assess his readiness for extubation.     8.   Fever.  Continue Abx.   If he has another spike, may need blood cultures?      Critical Care time spent in direct patient care: 40 minutes including high complexity decision making to assess, manipulate, and support vital organ system failure in this individual who has impairment of one or more vital organ systems such that there is a high probability of  imminent or life threatening deterioration in the patient’s condition. This time excludes other billable procedures. Time does include preparation of documents, review of old records, and direct bedside care.    We have reviewed patient's current orders and changes, if any, have been suggested to primary care team. Plan was also discussed with nursing staff, as necessary.     This document was electronically signed by Bria Tamayo MD on 11/05/19 at 7:57 AM      Dictated utilizing Dragon dictation.

## 2019-11-05 NOTE — PROGRESS NOTES
[facility]    Name:  Eddie Prieto   Age:  44 y.o.  Sex:  male  :  1975  MRN:  [id number]   Visit Number:  [id number]  Admission Date:  10/30/2019  Date Of Service:  19  Primary Care Physician:  Shivani Canales APRN     LOS: 6 days :  Patient Care Team:  Shivani Canales APRN as PCP - General (Family Medicine):    Chief Complaint:      Follow-up on respiratory failure, septic shock, drug overdose    Subjective / Interval History:     Patient remains intubated.  He is on mild sedation, being weaned from propofol.  He is somewhat responsive.  Attempt to wean today per pulmonology.  Still febrile overnight per nursing.    Prior work-up:  44-year-old male with history of drug abuse was brought to the emergency room by EMS after they were called by someone who was driving the patient around stating that the patient became unresponsive.  The history is obtained from the emergency room physician and the medical record.  The patient was given 2 mg of IV Narcan by EMS with some improvement in his mental status but he was agitated and confused.  When he came to the emergency room he was ashen looking, diaphoretic and cyanotic with minimal respiratory effort.  Patient was given 2 more milligram of Narcan while getting Ambu bag ventilation with minimal response.  His oxygen saturation was 36% in the emergency room and he was subsequently intubated in the emergency room.  Patient required pressors on day of admission due to hypotension.  He was significantly tachycardic and was started on phenylephrine.  This was able to be titrated down over the following day his sinus tachycardia resolved and blood pressure stabilized.  Patient started on broad-spectrum antibiotic's with vancomycin and Zosyn.  Cultures obtained.    Review of Systems:   Complete review of systems unobtainable secondary to patient's intubated and mildly sedated status.    Vital  Signs:    Temp:  [99.5 °F (37.5 °C)-101.8 °F (38.8 °C)] 99.8 °F (37.7 °C)  Heart Rate:  [62-89] (P) 63  Resp:  [22-29] (P) 22  BP: ()/() 94/50  FiO2 (%):  [50 %] 50 %    Intake and output:    I/O last 3 completed shifts:  In: 4328 [I.V.:2058; Other:860; NG/GT:1110; IV Piggyback:300]  Out: 2175 [Urine:2175]  I/O this shift:  In: 351 [I.V.:111; Other:240]  Out: 850 [Urine:850]    Physical Examination:    General Appearance:   Sedated and intubated with ET tube in place.   Head:  Atraumatic and normocephalic, without obvious abnormality.   Eyes:          PERRLA, conjunctivae and sclerae normal, no Icterus. No pallor. Extraocular movements are within normal limits.   Neck: Supple, trachea midline, no thyromegaly.  IJ CVP in place.   Lungs:   Breath sounds heard bilaterally equally.  Bilateral crackles, right greater than left.   Heart:  Normal S1 and S2, no murmur, no gallop, no rub. No JVD   Abdomen:   Normal bowel sounds, no masses, no organomegaly. Soft, non-tender, non-distended, no guarding, no rebound tenderness.   Extremities: Moves all extremities well, trace bilateral edema, no cyanosis, no clubbing.   Skin: No bleeding, bruising or rash.  Multiple ulcerations on upper and lower extremities.   Neurologic:  Remains sedated.  Was able to give me a  bilaterally.     Laboratory results:    Results from last 7 days   Lab Units 11/05/19  0439 11/04/19  0404 11/03/19  0359 11/02/19  0421  10/31/19  0500 10/30/19  1957   SODIUM mmol/L 147* 148* 145 142   < > 141 139   POTASSIUM mmol/L 3.9 3.6 3.4* 3.9   < > 3.6 3.9   CHLORIDE mmol/L 109* 110* 113* 114*   < > 109* 99   CO2 mmol/L 28.1 29.0 25.3 22.6   < > 23.3 18.8*   BUN mg/dL 25* 19 21* 27*   < > 19 18   CREATININE mg/dL 0.75* 0.73* 0.66* 0.83   < > 0.98 1.09   CALCIUM mg/dL 8.2* 8.4* 8.7 8.2*   < > 8.0* 9.3   BILIRUBIN mg/dL  --   --   --  0.3  --  0.4 0.2   ALK PHOS U/L  --   --   --  135*  --  100 140*   ALT (SGPT) U/L  --   --   --  17  --  32 39    AST (SGOT) U/L  --   --   --  21  --  38 50*   GLUCOSE mg/dL 109* 104* 130* 128*   < > 117* 315*    < > = values in this interval not displayed.     Results from last 7 days   Lab Units 11/05/19  0439 11/04/19  0404 11/03/19  0359   WBC 10*3/mm3 9.84 11.00* 14.62*   HEMOGLOBIN g/dL 8.6* 9.1* 9.6*   HEMATOCRIT % 27.4* 29.7* 31.2*   PLATELETS 10*3/mm3 231 241 273         Results from last 7 days   Lab Units 10/31/19  0500 10/30/19  1957   TROPONIN T ng/mL <0.010 <0.010     Results from last 7 days   Lab Units 11/04/19  1135 11/04/19  1130 10/31/19  1027 10/30/19  2137 10/30/19  2127   BLOODCX  No growth at 24 hours No growth at 24 hours  --  No growth at 5 days No growth at 5 days   MRSA SCREEN CX   --   --  No Methicillin Resistant Staphylococcus aureus isolated  --   --        I have reviewed the patient's laboratory results.    Radiology results:    Imaging Results (Last 24 Hours)     Procedure Component Value Units Date/Time    XR Chest 1 View [[id number]] Collected:  11/05/19 0957     Updated:  11/05/19 1001    Narrative:       PORTABLE CHEST     INDICATION: Respiratory failure.     FINDINGS: Single frontal portable chest, compared with 11/04/2019.  Patient is rotated to the right. EKG leads about the chest. Endotracheal  tube, nasogastric tube and left internal jugular venous catheter remain  in place. No pneumothorax. Prominent multifocal airspace parenchymal  infiltrates appear relatively similar to previous. No other interval  change.       Impression:       Persistence of bilateral infiltrates which may represent  pneumonia. Asymmetric edema or developing ARDS not entirely excluded.  Continued follow-up recommended.     This report was finalized on 11/5/2019 9:59 AM by Alejandro Stewart MD.          I have reviewed the patient's radiology reports.    Medication Review:     I have reviewed the patients active and prn medications.       Acute respiratory failure with hypoxia and hypercapnia (CMS/HCC)    Accidental  drug overdose    Toxic encephalopathy    Sepsis (CMS/HCC)    Aspiration pneumonia of both lower lobes due to vomit (CMS/HCC)    Tobacco dependence    Drug abuse (CMS/[facility])    Hyperglycemia      Assessment:    Acute toxic encephalopathy, present on admission.  Acute hypoxic and hypercapnic respiratory failure secondary to #2, present on admission.  Acute drug overdose with opiates and methamphetamine, present on admission.  Septic Shock secondary to pna, present on admission.  Bilateral lower lobe aspiration pneumonia, present on admission.  Acute kidney injury likely secondary to ATN from hypotension due to septic shock-resolved  Hyperglycemia, likely secondary to diabetes, present on admission. - resolved  Hypertensive urgency, present on admission - resolved  Nicotine dependence.  Recreational drug abuse.  Multiple skin ulcers, present on admission.    Plan:    Patient remains on mechanical ventilation since 10/30/2019.  Failed weaning trial yesterday and likely today.  Still having intermittent fevers overnight.  Currently on Precedex for sedation.  Morning BMP with creatinine 0.75 sodium 147.  Procalcitonin down to 2.72.  ABG with pH 7.45, PCO2 44, PO2 of 80.  White blood cell count of 9000.  Hemoglobin 8.6.  Cultures negative thus far.  Respiratory culture from 11/3/2019 growing Acinetobacter baumannii and candida albicans.  Pansensitive.  Chest x-ray from today with persistent bilateral infiltrates.    Per pulmonology, will make ventilation changes with decreasing respiratory rate of 20 and tidal volume to 500.  Continue with current antibiotics.  He is going to start Lasix as well.  Tube feeds per his recommendations.    Patient continues to meet ICU level of care.  He is on day 6 of mechanical ventilation.  Depending upon clinical improvement, he may need LTAC consideration.  Plan is care discussed with nursing and consultants.    Ange Esquivel DO  11/05/19  1:03 PM    Dictated utilizing Dragon  dictation.

## 2019-11-05 NOTE — PLAN OF CARE
Problem: Restraint, Nonbehavioral (Nonviolent)  Goal: Rationale and Justification  Outcome: Ongoing (interventions implemented as appropriate)    Goal: Nonbehavioral (Nonviolent) Restraint: Absence of Injury/Harm  Outcome: Ongoing (interventions implemented as appropriate)    Goal: Nonbehavioral (Nonviolent) Restraint: Preservation of Dignity and Wellbeing  Outcome: Ongoing (interventions implemented as appropriate)

## 2019-11-06 ENCOUNTER — APPOINTMENT (OUTPATIENT)
Dept: GENERAL RADIOLOGY | Facility: HOSPITAL | Age: 44
End: 2019-11-06

## 2019-11-06 LAB
A-A DO2: 251 MMHG
ANION GAP SERPL CALCULATED.3IONS-SCNC: 9.9 MMOL/L (ref 5–15)
ARTERIAL PATENCY WRIST A: ABNORMAL
ATMOSPHERIC PRESS: 743 MMHG
BASE EXCESS BLDA CALC-SCNC: 9.6 MMOL/L (ref 0–2)
BASOPHILS # BLD AUTO: 0.05 10*3/MM3 (ref 0–0.2)
BASOPHILS NFR BLD AUTO: 0.6 % (ref 0–1.5)
BDY SITE: ABNORMAL
BUN BLD-MCNC: 31 MG/DL (ref 6–20)
BUN/CREAT SERPL: 32.6 (ref 7–25)
CALCIUM SPEC-SCNC: 8.3 MG/DL (ref 8.6–10.5)
CHLORIDE SERPL-SCNC: 104 MMOL/L (ref 98–107)
CO2 SERPL-SCNC: 32.1 MMOL/L (ref 22–29)
COHGB MFR BLD: 1 % (ref 0–2)
CREAT BLD-MCNC: 0.95 MG/DL (ref 0.76–1.27)
DEPRECATED RDW RBC AUTO: 44.7 FL (ref 37–54)
EOSINOPHIL # BLD AUTO: 0.45 10*3/MM3 (ref 0–0.4)
EOSINOPHIL NFR BLD AUTO: 5.1 % (ref 0.3–6.2)
ERYTHROCYTE [DISTWIDTH] IN BLOOD BY AUTOMATED COUNT: 15 % (ref 12.3–15.4)
GFR SERPL CREATININE-BSD FRML MDRD: 86 ML/MIN/1.73
GLUCOSE BLD-MCNC: 129 MG/DL (ref 65–99)
GLUCOSE BLDC GLUCOMTR-MCNC: 108 MG/DL (ref 70–130)
GLUCOSE BLDC GLUCOMTR-MCNC: 118 MG/DL (ref 70–130)
GLUCOSE BLDC GLUCOMTR-MCNC: 129 MG/DL (ref 70–130)
GLUCOSE BLDC GLUCOMTR-MCNC: 139 MG/DL (ref 70–130)
GLUCOSE BLDC GLUCOMTR-MCNC: 143 MG/DL (ref 70–130)
HCO3 BLDA-SCNC: 35.3 MMOL/L (ref 22–28)
HCT VFR BLD AUTO: 26.8 % (ref 37.5–51)
HCT VFR BLD CALC: 26.3 %
HGB BLD-MCNC: 8.1 G/DL (ref 13–17.7)
HGB BLDA-MCNC: 8.6 G/DL (ref 12–18)
HOROWITZ INDEX BLD+IHG-RTO: 55 %
IMM GRANULOCYTES # BLD AUTO: 0.21 10*3/MM3 (ref 0–0.05)
IMM GRANULOCYTES NFR BLD AUTO: 2.4 % (ref 0–0.5)
LYMPHOCYTES # BLD AUTO: 2.22 10*3/MM3 (ref 0.7–3.1)
LYMPHOCYTES NFR BLD AUTO: 24.9 % (ref 19.6–45.3)
MCH RBC QN AUTO: 24.5 PG (ref 26.6–33)
MCHC RBC AUTO-ENTMCNC: 30.2 G/DL (ref 31.5–35.7)
MCV RBC AUTO: 81 FL (ref 79–97)
METHGB BLD QL: 0.5 % (ref 0–1.5)
MODALITY: ABNORMAL
MONOCYTES # BLD AUTO: 0.83 10*3/MM3 (ref 0.1–0.9)
MONOCYTES NFR BLD AUTO: 9.3 % (ref 5–12)
NEUTROPHILS # BLD AUTO: 5.15 10*3/MM3 (ref 1.7–7)
NEUTROPHILS NFR BLD AUTO: 57.7 % (ref 42.7–76)
NOTE: ABNORMAL
NRBC BLD AUTO-RTO: 0 /100 WBC (ref 0–0.2)
OXYHGB MFR BLDV: 93.8 % (ref 94–99)
PCO2 BLDA: 54.4 MM HG (ref 35–45)
PCO2 TEMP ADJ BLD: ABNORMAL MM[HG]
PEEP RESPIRATORY: 6 CM[H2O]
PH BLDA: 7.42 PH UNITS (ref 7.3–7.5)
PH, TEMP CORRECTED: ABNORMAL
PLATELET # BLD AUTO: 219 10*3/MM3 (ref 140–450)
PMV BLD AUTO: 10.6 FL (ref 6–12)
PO2 BLDA: 73.5 MM HG (ref 75–100)
PO2 TEMP ADJ BLD: ABNORMAL MM[HG]
POTASSIUM BLD-SCNC: 3.8 MMOL/L (ref 3.5–5.2)
RBC # BLD AUTO: 3.31 10*6/MM3 (ref 4.14–5.8)
SAO2 % BLDCOA: 95.2 % (ref 94–100)
SET MECH RESP RATE: 20
SODIUM BLD-SCNC: 146 MMOL/L (ref 136–145)
VENTILATOR MODE: AC
VT ON VENT VENT: 500 ML
WBC NRBC COR # BLD: 8.91 10*3/MM3 (ref 3.4–10.8)

## 2019-11-06 PROCEDURE — 82805 BLOOD GASES W/O2 SATURATION: CPT

## 2019-11-06 PROCEDURE — 87186 SC STD MICRODIL/AGAR DIL: CPT | Performed by: INTERNAL MEDICINE

## 2019-11-06 PROCEDURE — 87205 SMEAR GRAM STAIN: CPT | Performed by: INTERNAL MEDICINE

## 2019-11-06 PROCEDURE — 99232 SBSQ HOSP IP/OBS MODERATE 35: CPT | Performed by: FAMILY MEDICINE

## 2019-11-06 PROCEDURE — 80048 BASIC METABOLIC PNL TOTAL CA: CPT | Performed by: FAMILY MEDICINE

## 2019-11-06 PROCEDURE — 83050 HGB METHEMOGLOBIN QUAN: CPT

## 2019-11-06 PROCEDURE — 82962 GLUCOSE BLOOD TEST: CPT

## 2019-11-06 PROCEDURE — 25010000002 PIPERACILLIN SOD-TAZOBACTAM PER 1 G: Performed by: FAMILY MEDICINE

## 2019-11-06 PROCEDURE — 25010000002 ENOXAPARIN PER 10 MG: Performed by: INTERNAL MEDICINE

## 2019-11-06 PROCEDURE — 25010000002 CHLOROTHIAZIDE PER 500 MG: Performed by: INTERNAL MEDICINE

## 2019-11-06 PROCEDURE — 94799 UNLISTED PULMONARY SVC/PX: CPT

## 2019-11-06 PROCEDURE — 25010000002 PIPERACILLIN SOD-TAZOBACTAM PER 1 G: Performed by: INTERNAL MEDICINE

## 2019-11-06 PROCEDURE — 31624 DX BRONCHOSCOPE/LAVAGE: CPT | Performed by: INTERNAL MEDICINE

## 2019-11-06 PROCEDURE — 25010000002 PROPOFOL 10 MG/ML EMULSION: Performed by: EMERGENCY MEDICINE

## 2019-11-06 PROCEDURE — 71045 X-RAY EXAM CHEST 1 VIEW: CPT

## 2019-11-06 PROCEDURE — 87070 CULTURE OTHR SPECIMN AEROBIC: CPT | Performed by: INTERNAL MEDICINE

## 2019-11-06 PROCEDURE — 85025 COMPLETE CBC W/AUTO DIFF WBC: CPT | Performed by: FAMILY MEDICINE

## 2019-11-06 PROCEDURE — 3E1F88Z IRRIGATION OF RESPIRATORY TRACT USING IRRIGATING SUBSTANCE, VIA NATURAL OR ARTIFICIAL OPENING ENDOSCOPIC: ICD-10-PCS | Performed by: INTERNAL MEDICINE

## 2019-11-06 PROCEDURE — 0BJ08ZZ INSPECTION OF TRACHEOBRONCHIAL TREE, VIA NATURAL OR ARTIFICIAL OPENING ENDOSCOPIC: ICD-10-PCS | Performed by: INTERNAL MEDICINE

## 2019-11-06 PROCEDURE — 82375 ASSAY CARBOXYHB QUANT: CPT

## 2019-11-06 PROCEDURE — 36600 WITHDRAWAL OF ARTERIAL BLOOD: CPT

## 2019-11-06 PROCEDURE — 87077 CULTURE AEROBIC IDENTIFY: CPT | Performed by: INTERNAL MEDICINE

## 2019-11-06 PROCEDURE — 99291 CRITICAL CARE FIRST HOUR: CPT | Performed by: INTERNAL MEDICINE

## 2019-11-06 PROCEDURE — 94003 VENT MGMT INPAT SUBQ DAY: CPT

## 2019-11-06 PROCEDURE — 25010000002 FENTANYL CITRATE (PF) 100 MCG/2ML SOLUTION 20 ML VIAL: Performed by: INTERNAL MEDICINE

## 2019-11-06 PROCEDURE — 25010000002 VANCOMYCIN 5 G RECONSTITUTED SOLUTION 5,000 MG VIAL: Performed by: FAMILY MEDICINE

## 2019-11-06 RX ORDER — CHLOROTHIAZIDE SODIUM 500 MG/1
250 INJECTION INTRAVENOUS EVERY 6 HOURS
Status: COMPLETED | OUTPATIENT
Start: 2019-11-06 | End: 2019-11-06

## 2019-11-06 RX ADMIN — DEXMEDETOMIDINE HYDROCHLORIDE 1.5 MCG/KG/HR: 4 INJECTION, SOLUTION INTRAVENOUS at 13:26

## 2019-11-06 RX ADMIN — CHLOROTHIAZIDE SODIUM 250 MG: 500 INJECTION, POWDER, LYOPHILIZED, FOR SOLUTION INTRAVENOUS at 18:37

## 2019-11-06 RX ADMIN — DEXMEDETOMIDINE HYDROCHLORIDE 1.41 MCG/KG/HR: 4 INJECTION, SOLUTION INTRAVENOUS at 02:10

## 2019-11-06 RX ADMIN — VANCOMYCIN HYDROCHLORIDE 1250 MG: 500 INJECTION, POWDER, LYOPHILIZED, FOR SOLUTION INTRAVENOUS at 00:37

## 2019-11-06 RX ADMIN — IPRATROPIUM BROMIDE AND ALBUTEROL SULFATE 3 ML: .5; 3 SOLUTION RESPIRATORY (INHALATION) at 00:10

## 2019-11-06 RX ADMIN — PROPOFOL 50 MCG/KG/MIN: 10 INJECTION, EMULSION INTRAVENOUS at 14:48

## 2019-11-06 RX ADMIN — Medication 1 CAPSULE: at 20:46

## 2019-11-06 RX ADMIN — TAZOBACTAM SODIUM AND PIPERACILLIN SODIUM 4.5 G: 500; 4 INJECTION, SOLUTION INTRAVENOUS at 12:59

## 2019-11-06 RX ADMIN — Medication 1 CAPSULE: at 09:15

## 2019-11-06 RX ADMIN — ACETAMINOPHEN 650 MG: 650 SOLUTION ORAL at 15:17

## 2019-11-06 RX ADMIN — PROPOFOL 50 MCG/KG/MIN: 10 INJECTION, EMULSION INTRAVENOUS at 05:52

## 2019-11-06 RX ADMIN — SODIUM CHLORIDE, PRESERVATIVE FREE 10 ML: 5 INJECTION INTRAVENOUS at 20:49

## 2019-11-06 RX ADMIN — FAMOTIDINE 20 MG: 10 INJECTION, SOLUTION INTRAVENOUS at 09:15

## 2019-11-06 RX ADMIN — FENTANYL CITRATE 300 MCG/HR: 50 INJECTION INTRAVENOUS at 02:32

## 2019-11-06 RX ADMIN — FENTANYL CITRATE 300 MCG/HR: 50 INJECTION INTRAVENOUS at 20:57

## 2019-11-06 RX ADMIN — DEXMEDETOMIDINE HYDROCHLORIDE 1.41 MCG/KG/HR: 4 INJECTION, SOLUTION INTRAVENOUS at 21:13

## 2019-11-06 RX ADMIN — ENOXAPARIN SODIUM 40 MG: 40 INJECTION SUBCUTANEOUS at 22:58

## 2019-11-06 RX ADMIN — FAMOTIDINE 20 MG: 10 INJECTION, SOLUTION INTRAVENOUS at 20:47

## 2019-11-06 RX ADMIN — DEXMEDETOMIDINE HYDROCHLORIDE 1.5 MCG/KG/HR: 4 INJECTION, SOLUTION INTRAVENOUS at 09:37

## 2019-11-06 RX ADMIN — IPRATROPIUM BROMIDE AND ALBUTEROL SULFATE 3 ML: .5; 3 SOLUTION RESPIRATORY (INHALATION) at 18:59

## 2019-11-06 RX ADMIN — PROPOFOL 50 MCG/KG/MIN: 10 INJECTION, EMULSION INTRAVENOUS at 21:11

## 2019-11-06 RX ADMIN — PROPOFOL 50 MCG/KG/MIN: 10 INJECTION, EMULSION INTRAVENOUS at 10:31

## 2019-11-06 RX ADMIN — TAZOBACTAM SODIUM AND PIPERACILLIN SODIUM 4.5 G: 500; 4 INJECTION, SOLUTION INTRAVENOUS at 20:45

## 2019-11-06 RX ADMIN — FENTANYL CITRATE 300 MCG/HR: 50 INJECTION INTRAVENOUS at 17:03

## 2019-11-06 RX ADMIN — ENOXAPARIN SODIUM 40 MG: 40 INJECTION SUBCUTANEOUS at 00:36

## 2019-11-06 RX ADMIN — DEXMEDETOMIDINE HYDROCHLORIDE 1.41 MCG/KG/HR: 4 INJECTION, SOLUTION INTRAVENOUS at 05:59

## 2019-11-06 RX ADMIN — SODIUM CHLORIDE, PRESERVATIVE FREE 10 ML: 5 INJECTION INTRAVENOUS at 20:48

## 2019-11-06 RX ADMIN — TAZOBACTAM SODIUM AND PIPERACILLIN SODIUM 4.5 G: 500; 4 INJECTION, SOLUTION INTRAVENOUS at 04:10

## 2019-11-06 RX ADMIN — FENTANYL CITRATE 300 MCG/HR: 50 INJECTION INTRAVENOUS at 05:59

## 2019-11-06 RX ADMIN — PROPOFOL 50 MCG/KG/MIN: 10 INJECTION, EMULSION INTRAVENOUS at 16:56

## 2019-11-06 RX ADMIN — PROPOFOL 50 MCG/KG/MIN: 10 INJECTION, EMULSION INTRAVENOUS at 01:50

## 2019-11-06 RX ADMIN — SODIUM CHLORIDE, PRESERVATIVE FREE 10 ML: 5 INJECTION INTRAVENOUS at 09:15

## 2019-11-06 RX ADMIN — IPRATROPIUM BROMIDE AND ALBUTEROL SULFATE 3 ML: .5; 3 SOLUTION RESPIRATORY (INHALATION) at 14:26

## 2019-11-06 RX ADMIN — FENTANYL CITRATE 300 MCG/HR: 50 INJECTION INTRAVENOUS at 13:24

## 2019-11-06 RX ADMIN — CHLOROTHIAZIDE SODIUM 250 MG: 500 INJECTION, POWDER, LYOPHILIZED, FOR SOLUTION INTRAVENOUS at 12:59

## 2019-11-06 RX ADMIN — MULTIVITAMIN 15 ML: LIQUID ORAL at 09:15

## 2019-11-06 RX ADMIN — FENTANYL CITRATE 300 MCG/HR: 50 INJECTION INTRAVENOUS at 09:36

## 2019-11-06 RX ADMIN — CHLORHEXIDINE GLUCONATE 0.12% ORAL RINSE 15 ML: 1.2 LIQUID ORAL at 20:46

## 2019-11-06 RX ADMIN — DEXMEDETOMIDINE HYDROCHLORIDE 1.5 MCG/KG/HR: 4 INJECTION, SOLUTION INTRAVENOUS at 17:06

## 2019-11-06 RX ADMIN — CHLORHEXIDINE GLUCONATE 0.12% ORAL RINSE 15 ML: 1.2 LIQUID ORAL at 09:15

## 2019-11-06 RX ADMIN — IPRATROPIUM BROMIDE AND ALBUTEROL SULFATE 3 ML: .5; 3 SOLUTION RESPIRATORY (INHALATION) at 06:48

## 2019-11-06 NOTE — PROGRESS NOTES
Adult Nutrition  Assessment/PES    Patient Name:  Eddie Prieto  YOB: 1975  MRN: [id number]  Admit Date:  10/30/2019    Assessment Date:  11/6/2019    Comments:  Rec. #1: Continue NPO while intubated. If pt. Remains intubated continue with current tube feeding regimen.Isosource 1.5 via NG. Pt is at goal rate of 40 mL/hr providing 1320 kcals, 59.8 gm protein, and 672 mL tube feeding water. Free water flushes 100 mL Q4 hours or six times daily. Rec. #2: Continue with MVI. RD to follow pt. Consult RD PRN.      Reason for Assessment     Row Name 11/06/19 1048          Reason for Assessment    Reason For Assessment  TF/PN;follow-up protocol     Diagnosis  substance use/abuse;metabolic state;infection/sepsis;pulmonary disease;other (see comments) Intubated, Recreational drug use, Toxic encephalopathy, Sepsis, PNA, Multiple ulcers     Identified At Risk by Screening Criteria  tube feeding or parenteral nutrition           Anthropometrics     Row Name 11/06/19 0600          Anthropometrics    Weight  76 kg (167 lb 9.6 oz)         Labs/Tests/Procedures/Meds     Row Name 11/06/19 1048          Labs/Procedures/Meds    Lab Results Reviewed  reviewed, pertinent     Lab Results Comments  High: Na, BUN, HgbA1c  Low: Phosphorus, Albumin        Medications    Pertinent Medications Reviewed  reviewed     Pertinent Medications Comments  MVI with iron-minerals         Physical Findings     Row Name 11/06/19 1049          Physical Findings    Overall Physical Appearance  on ventilator support     Tubes  nasogastric tube           Nutrition Prescription Ordered     Row Name 11/06/19 1049          Nutrition Prescription PO    Current PO Diet  NPO Pt on vent and receiving EN        Nutrition Prescription EN    Enteral Route  NG     Product  Isosource 1.5 (Jevity 1.5)     TF Delivery Method  Continuous     Continuous TF Goal Rate (mL/hr)  40 mL/hr     Continuous TF Current Rate (mL/hr)  0 mL/hr held for weaning trial      Continuous TF Goal Volume (mL)  880 mL     Continuous TF Current Volume (mL)  0 mL     Water flush (mL)   100 mL     Water Flush Frequency  Every 4 hours         Evaluation of Received Nutrient/Fluid Intake     Row Name 11/06/19 1049          PO Evaluation    Number of Days PO Intake Evaluated  Insufficient Data        EN Evaluation    Number of Days EN Intake Evaluated  2 days     EN Average Volume Delivered (mL/day)  880 mL/day     % Goal Volume   100 %     TF Changes  Held for weaning trial               Problem/Interventions:  Problem 1     Row Name 11/06/19 1050          Nutrition Diagnoses Problem 1    Problem 1  Increased Nutrient Needs     Macronutrient  Kcal;Fluid;Protein     Etiology (related to)  Medical Diagnosis     Infectious Disease  Sepsis     Signs/Symptoms (evidenced by)  Other (comment) Infection         Problem 2     Row Name 11/06/19 1050          Nutrition Diagnoses Problem 2    Problem 2  Inadequate Intake/Infusion     Inadequate Intake Type  Oral     Macronutrient  Kcal;Fluid;Fiber;Protein;Fat;Carbohydrate     Micronutrient  Vitamin;Mineral     Etiology (related to)  MNT for Treatment/Condition     Signs/Symptoms (evidenced by)  NPO             Intervention Goal     Row Name 11/06/19 1050          Intervention Goal    General  Meet nutritional needs for age/condition     PO  -- continue NPO     TF/PN  -- if pt. unable to be extubated consider continuation of tube feeding order     Transition  TF to PO           Nutrition Prescription     Row Name 11/06/19 1051          Nutrition Prescription PO    PO Prescription  -- continue NPO if pt. to remain intubated        Nutrition Prescription EN    Enteral Prescription  -- Continue with current TF regimen if pt. to remain intubated. TF managed by pulmonologist        Other Orders    Supplement  -- continue MVI     Other  cotninue to monitor/replace electrolytes         Education/Evaluation     Row Name 11/06/19 1053          Education     Education  Education not appropriate at this time     Please explain  Patient intubated        Monitor/Evaluation    Monitor  Per protocol;I&O;Pertinent labs;Weight;TF delivery/tolerance           Electronically signed by:  Yu Hidalgo RD  11/06/19 10:53 AM

## 2019-11-06 NOTE — PROGRESS NOTES
[facility]    Name:  Eddie Prieto   Age:  44 y.o.  Sex:  male  :  1975  MRN:  [id number]   Visit Number:  [id number]  Admission Date:  10/30/2019  Date Of Service:  19  Primary Care Physician:  Shivani Canales APRN     LOS: 7 days :  Patient Care Team:  Shivani Canales APRN as PCP - General (Family Medicine):    Chief Complaint:      Follow-up on respiratory failure, septic shock, drug overdose    Subjective / Interval History:     Patient remains intubated and sedated.  Failed weaning trial yesterday.  Still having fevers overnight.  Having a lot of secretions per nursing.  Becomes very agitated when sedation is weaned.    Prior work-up:  44-year-old male with history of drug abuse was brought to the emergency room by EMS after they were called by someone who was driving the patient around stating that the patient became unresponsive.  The history is obtained from the emergency room physician and the medical record.  The patient was given 2 mg of IV Narcan by EMS with some improvement in his mental status but he was agitated and confused.  When he came to the emergency room he was ashen looking, diaphoretic and cyanotic with minimal respiratory effort.  Patient was given 2 more milligram of Narcan while getting Ambu bag ventilation with minimal response.  His oxygen saturation was 36% in the emergency room and he was subsequently intubated in the emergency room.  Patient required pressors on day of admission due to hypotension.  He was significantly tachycardic and was started on phenylephrine.  This was able to be titrated down over the following day his sinus tachycardia resolved and blood pressure stabilized.  Patient started on broad-spectrum antibiotic's with vancomycin and Zosyn.  Cultures obtained.  Review of Systems:     Complete review of systems unobtainable secondary to patient's intubated and sedated status.    Vital  Signs:    Temp:  [98.8 °F (37.1 °C)-103 °F (39.4 °C)] 100.8 °F (38.2 °C)  Heart Rate:  [58-86] 65  Resp:  [18-28] 20  BP: ()/(47-89) 103/60  FiO2 (%):  [50 %-55 %] 55 %    Intake and output:    I/O last 3 completed shifts:  In: 5517 [I.V.:2804; Other:1381; NG/GT:782; IV Piggyback:550]  Out: 5390 [Urine:5390]  I/O this shift:  In: -   Out: 210 [Urine:210]    Physical Examination:    General Appearance:   Sedated, intubated   Head:  Atraumatic and normocephalic, without obvious abnormality.   Eyes:          PERRLA, conjunctivae and sclerae normal, no Icterus. No pallor. Extraocular movements are within normal limits.   Neck: Supple, trachea midline, no thyromegaly.   Lungs:   Breath sounds heard bilaterally equally.  Bilateral crackles.   Heart:  Normal S1 and S2, no murmur, no gallop, no rub. No JVD   Abdomen:   Normal bowel sounds, no masses, no organomegaly. Soft, non-tender, non-distended, no guarding, no rebound tenderness.   Extremities: Moves all extremities well, trace lower edema, no cyanosis, no clubbing.   Skin: No bleeding, bruising or rash.  Multiple ulcerations of her lower extremities.   Neurologic:  Sedated.     Laboratory results:    Results from last 7 days   Lab Units 11/06/19  0407 11/05/19  0439 11/04/19  0404  11/02/19  0421  10/31/19  0500 10/30/19  1957   SODIUM mmol/L 146* 147* 148*   < > 142   < > 141 139   POTASSIUM mmol/L 3.8 3.9 3.6   < > 3.9   < > 3.6 3.9   CHLORIDE mmol/L 104 109* 110*   < > 114*   < > 109* 99   CO2 mmol/L 32.1* 28.1 29.0   < > 22.6   < > 23.3 18.8*   BUN mg/dL 31* 25* 19   < > 27*   < > 19 18   CREATININE mg/dL 0.95 0.75* 0.73*   < > 0.83   < > 0.98 1.09   CALCIUM mg/dL 8.3* 8.2* 8.4*   < > 8.2*   < > 8.0* 9.3   BILIRUBIN mg/dL  --   --   --   --  0.3  --  0.4 0.2   ALK PHOS U/L  --   --   --   --  135*  --  100 140*   ALT (SGPT) U/L  --   --   --   --  17  --  32 39   AST (SGOT) U/L  --   --   --   --  21  --  38 50*   GLUCOSE mg/dL 129* 109* 104*   < > 128*   <  > 117* 315*    < > = values in this interval not displayed.     Results from last 7 days   Lab Units 11/06/19  0407 11/05/19  0439 11/04/19  0404   WBC 10*3/mm3 8.91 9.84 11.00*   HEMOGLOBIN g/dL 8.1* 8.6* 9.1*   HEMATOCRIT % 26.8* 27.4* 29.7*   PLATELETS 10*3/mm3 219 231 241         Results from last 7 days   Lab Units 10/31/19  0500 10/30/19  1957   TROPONIN T ng/mL <0.010 <0.010     Results from last 7 days   Lab Units 11/04/19  1135 11/04/19  1130 10/31/19  1027 10/30/19  2137 10/30/19  2127   BLOODCX  No growth at 2 days No growth at 2 days  --  No growth at 5 days No growth at 5 days   MRSA SCREEN CX   --   --  No Methicillin Resistant Staphylococcus aureus isolated  --   --        I have reviewed the patient's laboratory results.    Radiology results:    Imaging Results (Last 24 Hours)     Procedure Component Value Units Date/Time    XR Chest 1 View [[id number]] Collected:  11/06/19 0959     Updated:  11/06/19 1009    Narrative:       PORTABLE CHEST     INDICATION: Respiratory failure.     FINDINGS: Single frontal portable chest, compared with 11/05/2019. EKG  leads overlie the chest. Endotracheal tube, left internal jugular venous  catheter and nasogastric tube remain in place. No pneumothorax.  Multifocal parenchymal infiltrates in both lungs are again identified.  Allowing for differences in technique and positioning, these are  probably similar to previous. Slight worsening on the left not excluded,  although this is possibly due to differences in positioning.       Impression:       No definite change. Continued follow-up recommended.     This report was finalized on 11/6/2019 10:07 AM by Alejandro Stewart MD.          I have reviewed the patient's radiology reports.    Medication Review:     I have reviewed the patients active and prn medications.       Acute respiratory failure with hypoxia and hypercapnia (CMS/HCC)    Accidental drug overdose    Toxic encephalopathy    Sepsis (CMS/HCC)    Aspiration  pneumonia of both lower lobes due to vomit (CMS/[facility])    Tobacco dependence    Drug abuse (CMS/[facility])    Hyperglycemia      Assessment:    Acute toxic encephalopathy, present on admission.  Acute hypoxic and hypercapnic respiratory failure secondary to #2, present on admission.  Acute drug overdose with opiates and methamphetamine, present on admission.  Septic Shock secondary to pna, present on admission.  Bilateral lower lobe aspiration pneumonia, present on admission.  Acute kidney injury likely secondary to ATN from hypotension due to septic shock-resolved  Hyperglycemia, likely secondary to diabetes, present on admission. - resolved  Hypertensive urgency, present on admission - resolved  Nicotine dependence.  Recreational drug abuse.  Multiple skin ulcers, present on admission.    Plan:    Patient on day 7 of mechanical ventilation.  Continues to fail weaning trial secondary to tachypnea and agitation.  Is on Precedex currently.  Still with intermittent fevers overnight.  CBC white blood cell count 8900.  Hemoglobin 8.  BMP with creatinine 0.95.  Sodium 146.  ABG with pH 7.42.  PCO2 54.  PO2 73.  Chest x-ray without definitive change.  Still with multifocal infiltrates in both lungs.  Is on Vanco and Zosyn.    Per pulmonology, with increasing FiO2 requirements and significant secretions, likely to have bronchoscopy.  Would consider extubation thereafter.  Would recommend Lasix drip and this is been started.  Procalcitonin level continues to decrease.  We will stop vancomycin and continue Zosyn and/or switch to Rocephin.  Continue antibiotics for another 3 days.  Has tube feeds in place.  If patient continues to fail extubation then tracheostomy will need to be considered.    Patient continues to meet inpatient level of care and will remain in the ICU.  Anticipate multiple day stay remaining.    Ange Esquivel DO  11/06/19  12:38 PM    Dictated utilizing Dragon dictation.

## 2019-11-06 NOTE — PROGRESS NOTES
Continued Stay Note   Christopher     Patient Name: Eddie Prieto  MRN: [id number]  Today's Date: 11/6/2019    Admit Date: 10/30/2019    Discharge Plan     Row Name 11/06/19 1252       Plan    Plan  Spoke to pt.'s sister. Still following for discharge planning.        Discharge Codes    No documentation.       Expected Discharge Date and Time     Expected Discharge Date Expected Discharge Time    Nov 9, 2019             Scarlett Acevedo LCSW

## 2019-11-06 NOTE — OP NOTE
Date of Procedure: November 6, 2019    Type:   1. Bronchoscopy with BAL        Reason for procedure:   1.  Acute respiratory failure  2.  Significant secretions.     Consent: Consent was obtained from the Patient's Family after explanation of the risks and benefits of the procedure. Risks including but not limited to damage to the overlying structures, pneumothorax, bleeding, infection, worsening of respiratory status, requirement for chest tube placement among others were explained.    Patient's Family understood the risks and benefits and agreed to proceed with the procedure.    Time Out: Time out was done with the RN & Bronch Technician at the bedside after confirmation of the site of the procedure and name and date of birth with the patient's ID band.    Details of the procedure:   The patient was on mechanical ventilation and was already on sedation but to provide more comprehensive sedation during the procedure, he was given additional dose of Propofol 15 ml during the procedure.     The bronchoscope was introduced through the endotracheal tube. Upon passing the bronchoscope through the endotracheal tube, the trachea was immediately visualized.    The trachea was central. The gilles was sharp.  No secretions which were seen on both sides, on the right as well as in the left mainstem.    Right side was inspected first. The bronchoscope was introduced into the main stem.  The right mainstem was mostly clear.  The right bronchus intermedius showed minimal secretions.  Right upper lobe was mostly patent. The right middle and lower lobes had copious secretions which were suctioned.  No endobronchial lesion was identified.     Next, the left side was evaluated. The bronchoscope was introduced into the main stem.  The left mainstem had copious secretions completely filling the left mainstem, left lingula and left lower lobe. Left upper lobe was mostly clear.  No endobronchial lesion was identified.     About 100 ml  of saline was instilled with return of about 40 ml during the procedure.     The samples obtained during the procedure will be sent for cultures.    The patient will be kept on mechanical ventilation.     Complications:  No immediate complications noted.      Chest X Ray has been ordered for tomorrow morning     Post Op Impression:  1.  Acute Respiratory Failure.  2.  Pneumonia.          This document was electronically signed by Bria Tamayo MD on 11/06/19 at 4:35 PM

## 2019-11-06 NOTE — PROGRESS NOTES
"  CC: Acute Respiratory Failure.     S: Intubated and sedated.  Failed SBT yesterday again due to significant agitation, tachycardia and tachypnea.  He was placed on Lasix drip with good response.    ROS: Could not be obtained as the patient is on mechanical ventilator.    O:Vital signs reviewed.  FiO2: 55%. CVP Line. Day # 7. Vent Day # 7.  BP 95/50   Pulse 62   Temp 99.5 °F (37.5 °C) (Oral)   Resp 20   Ht 170.2 cm (67.01\")   Wt 76 kg (167 lb 9.6 oz)   SpO2 96%   BMI 26.24 kg/m²     Temp (24hrs), Av.9 °F (37.7 °C), Min:98.8 °F (37.1 °C), Max:103 °F (39.4 °C)      I & Os reviewed.   Intake/Output       19 0700 - 19 0659    Intake (ml) 3946    Output (ml) 4665    Net (ml) -719    Last Weight  76 kg (167 lb 9.6 oz)          General/Constitutional: Intubated. Sedated.  Eyes: PERRL. 2-3 mm.   Neck: Supple without JVD. No obvious masses noted. Left IJ CVP line in place.   Cardiovascular: S1 + S2. Regular.    Lungs/Respiratory: Transmitted Breath sounds noted. No wheezing heard. Right > Left crackles noted.  GI/Abdomen: Soft. Bowel sounds positive. No organomegaly noted.  Musculoskeletal/Extremities: Trace edema noted. Gait could not be assessed at this time, as the patient was laying in bed.   Neurologic: Sedated. Detailed exam couldn't be performed due to sedation.    Psych: Could not be performed as he is currently sedated.   Skin: Appeared with diffuse scattered ulcerations over upper and lower extremities.     Labs: Reviewed.   Results from last 7 days   Lab Units 19  0407 19  0439 19  0404  19  0421  10/31/19  0500 10/30/19  1957   SODIUM mmol/L 146* 147* 148*   < > 142   < > 141 139   POTASSIUM mmol/L 3.8 3.9 3.6   < > 3.9   < > 3.6 3.9   CHLORIDE mmol/L 104 109* 110*   < > 114*   < > 109* 99   CO2 mmol/L 32.1* 28.1 29.0   < > 22.6   < > 23.3 18.8*   BUN mg/dL 31* 25* 19   < > 27*   < > 19 18   CREATININE mg/dL 0.95 0.75* 0.73*   < > 0.83   < > 0.98 1.09   CALCIUM " mg/dL 8.3* 8.2* 8.4*   < > 8.2*   < > 8.0* 9.3   BILIRUBIN mg/dL  --   --   --   --  0.3  --  0.4 0.2   ALK PHOS U/L  --   --   --   --  135*  --  100 140*   ALT (SGPT) U/L  --   --   --   --  17  --  32 39   AST (SGOT) U/L  --   --   --   --  21  --  38 50*   GLUCOSE mg/dL 129* 109* 104*   < > 128*   < > 117* 315*    < > = values in this interval not displayed.       Results from last 7 days   Lab Units 10/31/19  0500   MAGNESIUM mg/dL 1.7   PHOSPHORUS mg/dL 2.2*       Results from last 7 days   Lab Units 11/06/19  0407 11/05/19  0439 11/04/19  0404 11/03/19  0359 11/02/19  0421   WBC 10*3/mm3 8.91 9.84 11.00* 14.62* 10.38   NEUTROPHIL % % 57.7 64.0 73.0 82.7*  --    HEMOGLOBIN g/dL 8.1* 8.6* 9.1* 9.6* 9.1*   HEMATOCRIT % 26.8* 27.4* 29.7* 31.2* 30.6*   PLATELETS 10*3/mm3 219 231 241 273 235             Lab Results   Component Value Date    PROCALCITO 2.72 (H) 11/05/2019    PROCALCITO 7.46 (H) 11/03/2019    PROCALCITO 8.30 (H) 10/30/2019       Lab Results   Component Value Date    PROBNP 385.2 10/30/2019         dexmedetomidine 0.2-1.5 mcg/kg/hr Last Rate: 1.414 mcg/kg/hr (11/06/19 0559)   fentaNYL (SUBLIMAZE) infusion 10 mcg/ml  mcg/hr Last Rate: 300 mcg/hr (11/06/19 0559)   Pharmacy to dose vancomycin     Pharmacy to Dose Zosyn     propofol 5-50 mcg/kg/min Last Rate: 50 mcg/kg/min (11/06/19 0552)         Micro: As of November 6, 2019   Lab Results   Component Value Date    RESPCX Scant growth (1+) Acinetobacter baumannii (A) 11/03/2019    RESPCX Light growth (2+) Candida albicans (A) 11/03/2019     Lab Results   Component Value Date    BLOODCX No growth at 24 hours 11/04/2019    BLOODCX No growth at 24 hours 11/04/2019       Lab Results   Component Value Date    MRSACX  10/31/2019     No Methicillin Resistant Staphylococcus aureus isolated       ABG: Reviewed.  Lab Results   Component Value Date    PHART 7.420 11/06/2019    UCF4HZI 54.4 (H) 11/06/2019    PO2ART 73.5 (L) 11/06/2019    HGBBG 8.6 (L)  11/06/2019    I8GUFVHY 95.2 11/06/2019    CARBOXYHGB 1.0 11/06/2019       CXRay: Latest imaging study was reviewed personally.   Imaging Results (Last 24 Hours)     Procedure Component Value Units Date/Time    XR Chest 1 View [[id number]] Updated:  11/06/19 0347    XR Chest 1 View [[id number]] Collected:  11/05/19 0957     Updated:  11/05/19 1001    Narrative:       PORTABLE CHEST     INDICATION: Respiratory failure.     FINDINGS: Single frontal portable chest, compared with 11/04/2019.  Patient is rotated to the right. EKG leads about the chest. Endotracheal  tube, nasogastric tube and left internal jugular venous catheter remain  in place. No pneumothorax. Prominent multifocal airspace parenchymal  infiltrates appear relatively similar to previous. No other interval  change.       Impression:       Persistence of bilateral infiltrates which may represent  pneumonia. Asymmetric edema or developing ARDS not entirely excluded.  Continued follow-up recommended.     This report was finalized on 11/5/2019 9:59 AM by Alejandro Stewart MD.            Assessment & Recommendations/Plan:   1.  Acute Respiratory Failure  On mechanical ventilation.  His FiO2 requirements have increased over the past 2 to 3 days.  On Precedex, Fentanyl and Propofol.   Will consider bronch since he continues to have significant secretions, with abnormal chest x-ray and increasing FiO2 requirements.  We will consider extubation after bronchoscopy is performed.  Chest x-ray will be repeated in the morning.  Will administer Diuril today.    2.  Left sided ?aspiration? Pneumonia.   Procalcitonin level decreasing.    Will repeat procalcitonin level in 48 hours or so.  Sputum culture showing Acinetobacter, which is pansensitive.    We will discontinue vancomycin.  We will continue Zosyn for now although this can be switched to ceftriaxone.  I will however, continue antibiotics for another 3 days given significantly abnormal chest x-ray, worsening FiO2 and  continued secretions.    3.  Shock and respiratory acidosis  Resolved    4.  Drug overdose  Toxicology screen was positive for multiple agents.    5.  Anemia  Likely dilutional.     6.  Nutrition  Has been able to tolerate the tube feeds.  Will hold for now till we can assess his readiness for extubation.     7.   Fever.  Continue Abx.     The patient has failed extubation on multiple days and continues to have high FiO2 requirements.  If the patient continues to fail extubation then tracheostomy may need to be considered.    Critical Care time spent in direct patient care: 40 minutes including high complexity decision making to assess, manipulate, and support vital organ system failure in this individual who has impairment of one or more vital organ systems such that there is a high probability of imminent or life threatening deterioration in the patient’s condition. This time excludes other billable procedures. Time does include preparation of documents, review of old records, and direct bedside care.    We have reviewed patient's current orders and changes, if any, have been suggested to primary care team. Plan was also discussed with nursing staff, as necessary.     This document was electronically signed by Bria Tamayo MD on 11/06/19 at 9:27 AM      Dictated utilizing Dragon dictation.

## 2019-11-06 NOTE — PLAN OF CARE
Problem: Ventilation, Mechanical Invasive (Adult)  Intervention: Optimize Oxygenation/Ventilation   11/06/19 1818   Positioning   Body Position position maintained   Respiratory Interventions   Airway/Ventilation Management airway patency maintained;pulmonary hygiene promoted       Goal: Signs and Symptoms of Listed Potential Problems Will be Absent, Minimized or Managed (Ventilation, Mechanical Invasive)  Outcome: Ongoing (interventions implemented as appropriate)   11/06/19 1818   Goal/Outcome Evaluation   Problems Assessed (Mechanical Ventilation, Invasive) inability to wean   Problems Present (Mech Vent, Invasive) inability to wean

## 2019-11-06 NOTE — PLAN OF CARE
Problem: Ventilation, Mechanical Invasive (Adult)  Goal: Signs and Symptoms of Listed Potential Problems Will be Absent, Minimized or Managed (Ventilation, Mechanical Invasive)  Outcome: Ongoing (interventions implemented as appropriate)   11/05/19 1918   Goal/Outcome Evaluation   Problems Assessed (Mechanical Ventilation, Invasive) all   Problems Present (Mech Vent, Invasive) none

## 2019-11-07 ENCOUNTER — APPOINTMENT (OUTPATIENT)
Dept: GENERAL RADIOLOGY | Facility: HOSPITAL | Age: 44
End: 2019-11-07

## 2019-11-07 ENCOUNTER — APPOINTMENT (OUTPATIENT)
Dept: ULTRASOUND IMAGING | Facility: HOSPITAL | Age: 44
End: 2019-11-07

## 2019-11-07 LAB
A-A DO2: 201.9 MMHG
A-A DO2: 285.5 MMHG
ALBUMIN SERPL-MCNC: 2.1 G/DL (ref 3.5–5.2)
ALBUMIN/GLOB SERPL: 0.4 G/DL
ALP SERPL-CCNC: 253 U/L (ref 39–117)
ALT SERPL W P-5'-P-CCNC: 17 U/L (ref 1–41)
ANION GAP SERPL CALCULATED.3IONS-SCNC: 9.3 MMOL/L (ref 5–15)
ARTERIAL PATENCY WRIST A: POSITIVE
ARTERIAL PATENCY WRIST A: POSITIVE
AST SERPL-CCNC: 38 U/L (ref 1–40)
ATMOSPHERIC PRESS: 738 MMHG
ATMOSPHERIC PRESS: 742 MMHG
BACTERIA SPEC RESP CULT: ABNORMAL
BASE EXCESS BLDA CALC-SCNC: 4.1 MMOL/L (ref 0–2)
BASE EXCESS BLDA CALC-SCNC: 5.4 MMOL/L (ref 0–2)
BASOPHILS # BLD AUTO: 0.05 10*3/MM3 (ref 0–0.2)
BASOPHILS NFR BLD AUTO: 0.5 % (ref 0–1.5)
BDY SITE: ABNORMAL
BDY SITE: ABNORMAL
BILIRUB SERPL-MCNC: 0.7 MG/DL (ref 0.2–1.2)
BUN BLD-MCNC: 31 MG/DL (ref 6–20)
BUN/CREAT SERPL: 36.5 (ref 7–25)
CALCIUM SPEC-SCNC: 8.5 MG/DL (ref 8.6–10.5)
CHLORIDE SERPL-SCNC: 103 MMOL/L (ref 98–107)
CO2 SERPL-SCNC: 30.7 MMOL/L (ref 22–29)
COHGB MFR BLD: 0.7 % (ref 0–2)
COHGB MFR BLD: 0.9 % (ref 0–2)
CREAT BLD-MCNC: 0.85 MG/DL (ref 0.76–1.27)
DEPRECATED RDW RBC AUTO: 44.2 FL (ref 37–54)
EOSINOPHIL # BLD AUTO: 0.57 10*3/MM3 (ref 0–0.4)
EOSINOPHIL NFR BLD AUTO: 5.5 % (ref 0.3–6.2)
ERYTHROCYTE [DISTWIDTH] IN BLOOD BY AUTOMATED COUNT: 15 % (ref 12.3–15.4)
GFR SERPL CREATININE-BSD FRML MDRD: 97 ML/MIN/1.73
GLOBULIN UR ELPH-MCNC: 4.7 GM/DL
GLUCOSE BLD-MCNC: 128 MG/DL (ref 65–99)
GLUCOSE BLDC GLUCOMTR-MCNC: 160 MG/DL (ref 70–130)
GRAM STN SPEC: ABNORMAL
HCO3 BLDA-SCNC: 30 MMOL/L (ref 22–28)
HCO3 BLDA-SCNC: 32.1 MMOL/L (ref 22–28)
HCT VFR BLD AUTO: 27.4 % (ref 37.5–51)
HCT VFR BLD CALC: 30.2 %
HCT VFR BLD CALC: 33 %
HGB BLD-MCNC: 8.3 G/DL (ref 13–17.7)
HGB BLDA-MCNC: 10.8 G/DL (ref 12–18)
HGB BLDA-MCNC: 9.8 G/DL (ref 12–18)
HOROWITZ INDEX BLD+IHG-RTO: 50 %
HOROWITZ INDEX BLD+IHG-RTO: 60 %
IMM GRANULOCYTES # BLD AUTO: 0.2 10*3/MM3 (ref 0–0.05)
IMM GRANULOCYTES NFR BLD AUTO: 1.9 % (ref 0–0.5)
LYMPHOCYTES # BLD AUTO: 1.66 10*3/MM3 (ref 0.7–3.1)
LYMPHOCYTES NFR BLD AUTO: 16.1 % (ref 19.6–45.3)
MCH RBC QN AUTO: 24.6 PG (ref 26.6–33)
MCHC RBC AUTO-ENTMCNC: 30.3 G/DL (ref 31.5–35.7)
MCV RBC AUTO: 81.3 FL (ref 79–97)
METHGB BLD QL: 0.4 % (ref 0–1.5)
METHGB BLD QL: 0.8 % (ref 0–1.5)
MODALITY: ABNORMAL
MODALITY: ABNORMAL
MONOCYTES # BLD AUTO: 0.65 10*3/MM3 (ref 0.1–0.9)
MONOCYTES NFR BLD AUTO: 6.3 % (ref 5–12)
NEUTROPHILS # BLD AUTO: 7.2 10*3/MM3 (ref 1.7–7)
NEUTROPHILS NFR BLD AUTO: 69.7 % (ref 42.7–76)
NOTE: ABNORMAL
NOTE: ABNORMAL
NRBC BLD AUTO-RTO: 0 /100 WBC (ref 0–0.2)
OXYHGB MFR BLDV: 94.4 % (ref 94–99)
OXYHGB MFR BLDV: 94.5 % (ref 94–99)
PCO2 BLDA: 50.5 MM HG (ref 35–45)
PCO2 BLDA: 57.6 MM HG (ref 35–45)
PCO2 TEMP ADJ BLD: ABNORMAL MM[HG]
PCO2 TEMP ADJ BLD: ABNORMAL MM[HG]
PEEP RESPIRATORY: 6 CM[H2O]
PH BLDA: 7.36 PH UNITS (ref 7.3–7.5)
PH BLDA: 7.38 PH UNITS (ref 7.3–7.5)
PH, TEMP CORRECTED: ABNORMAL
PH, TEMP CORRECTED: ABNORMAL
PLATELET # BLD AUTO: 240 10*3/MM3 (ref 140–450)
PMV BLD AUTO: 11.3 FL (ref 6–12)
PO2 BLDA: 77.4 MM HG (ref 75–100)
PO2 BLDA: 81.4 MM HG (ref 75–100)
PO2 TEMP ADJ BLD: ABNORMAL MM[HG]
PO2 TEMP ADJ BLD: ABNORMAL MM[HG]
POTASSIUM BLD-SCNC: 3.5 MMOL/L (ref 3.5–5.2)
PROT SERPL-MCNC: 6.8 G/DL (ref 6–8.5)
PSV: 10 CMH2O
RBC # BLD AUTO: 3.37 10*6/MM3 (ref 4.14–5.8)
SAO2 % BLDCOA: 95.6 % (ref 94–100)
SAO2 % BLDCOA: 95.9 % (ref 94–100)
SODIUM BLD-SCNC: 143 MMOL/L (ref 136–145)
VENTILATOR MODE: ABNORMAL
VENTILATOR MODE: ABNORMAL
WBC NRBC COR # BLD: 10.33 10*3/MM3 (ref 3.4–10.8)

## 2019-11-07 PROCEDURE — 85025 COMPLETE CBC W/AUTO DIFF WBC: CPT | Performed by: FAMILY MEDICINE

## 2019-11-07 PROCEDURE — 25010000002 CEFTRIAXONE SODIUM-DEXTROSE 2-2.22 GM-%(50ML) RECONSTITUTED SOLUTION: Performed by: INTERNAL MEDICINE

## 2019-11-07 PROCEDURE — 25010000002 CHLOROTHIAZIDE PER 500 MG: Performed by: INTERNAL MEDICINE

## 2019-11-07 PROCEDURE — 94799 UNLISTED PULMONARY SVC/PX: CPT

## 2019-11-07 PROCEDURE — 25010000002 LORAZEPAM PER 2 MG: Performed by: FAMILY MEDICINE

## 2019-11-07 PROCEDURE — 25010000002 ENOXAPARIN PER 10 MG: Performed by: INTERNAL MEDICINE

## 2019-11-07 PROCEDURE — 63710000001 INSULIN REGULAR HUMAN PER 5 UNITS: Performed by: INTERNAL MEDICINE

## 2019-11-07 PROCEDURE — 25010000002 FENTANYL CITRATE (PF) 100 MCG/2ML SOLUTION 20 ML VIAL: Performed by: INTERNAL MEDICINE

## 2019-11-07 PROCEDURE — 25010000002 MORPHINE PER 10 MG: Performed by: INTERNAL MEDICINE

## 2019-11-07 PROCEDURE — 82962 GLUCOSE BLOOD TEST: CPT

## 2019-11-07 PROCEDURE — 80053 COMPREHEN METABOLIC PANEL: CPT | Performed by: FAMILY MEDICINE

## 2019-11-07 PROCEDURE — 99291 CRITICAL CARE FIRST HOUR: CPT | Performed by: INTERNAL MEDICINE

## 2019-11-07 PROCEDURE — 82805 BLOOD GASES W/O2 SATURATION: CPT

## 2019-11-07 PROCEDURE — 36600 WITHDRAWAL OF ARTERIAL BLOOD: CPT

## 2019-11-07 PROCEDURE — 76700 US EXAM ABDOM COMPLETE: CPT

## 2019-11-07 PROCEDURE — 87040 BLOOD CULTURE FOR BACTERIA: CPT | Performed by: FAMILY MEDICINE

## 2019-11-07 PROCEDURE — 25010000002 ALTEPLASE 2 MG RECONSTITUTED SOLUTION 1 EACH VIAL: Performed by: FAMILY MEDICINE

## 2019-11-07 PROCEDURE — 71045 X-RAY EXAM CHEST 1 VIEW: CPT

## 2019-11-07 PROCEDURE — 25010000002 MIDAZOLAM PER 1 MG: Performed by: INTERNAL MEDICINE

## 2019-11-07 PROCEDURE — 25010000002 PIPERACILLIN SOD-TAZOBACTAM PER 1 G: Performed by: INTERNAL MEDICINE

## 2019-11-07 PROCEDURE — 94003 VENT MGMT INPAT SUBQ DAY: CPT

## 2019-11-07 PROCEDURE — 25010000002 PROPOFOL 10 MG/ML EMULSION: Performed by: EMERGENCY MEDICINE

## 2019-11-07 PROCEDURE — 82375 ASSAY CARBOXYHB QUANT: CPT

## 2019-11-07 PROCEDURE — 99233 SBSQ HOSP IP/OBS HIGH 50: CPT | Performed by: FAMILY MEDICINE

## 2019-11-07 PROCEDURE — 83050 HGB METHEMOGLOBIN QUAN: CPT

## 2019-11-07 RX ORDER — MORPHINE SULFATE 2 MG/ML
2 INJECTION, SOLUTION INTRAMUSCULAR; INTRAVENOUS EVERY 4 HOURS PRN
Status: DISCONTINUED | OUTPATIENT
Start: 2019-11-07 | End: 2019-11-08

## 2019-11-07 RX ORDER — LORAZEPAM 2 MG/ML
0.5 INJECTION INTRAMUSCULAR EVERY 4 HOURS PRN
Status: DISCONTINUED | OUTPATIENT
Start: 2019-11-07 | End: 2019-11-08

## 2019-11-07 RX ORDER — CHLOROTHIAZIDE SODIUM 500 MG/1
500 INJECTION INTRAVENOUS EVERY 6 HOURS
Status: COMPLETED | OUTPATIENT
Start: 2019-11-07 | End: 2019-11-07

## 2019-11-07 RX ORDER — CEFTRIAXONE 2 G/50ML
2 INJECTION, SOLUTION INTRAVENOUS EVERY 24 HOURS
Status: COMPLETED | OUTPATIENT
Start: 2019-11-07 | End: 2019-11-08

## 2019-11-07 RX ORDER — MIDAZOLAM HYDROCHLORIDE 1 MG/ML
INJECTION INTRAMUSCULAR; INTRAVENOUS
Status: DISPENSED
Start: 2019-11-07 | End: 2019-11-08

## 2019-11-07 RX ORDER — MIDAZOLAM HYDROCHLORIDE 2 MG/2ML
2 INJECTION, SOLUTION INTRAMUSCULAR; INTRAVENOUS ONCE
Status: DISCONTINUED | OUTPATIENT
Start: 2019-11-07 | End: 2019-11-07

## 2019-11-07 RX ORDER — MIDAZOLAM HYDROCHLORIDE 1 MG/ML
2 INJECTION INTRAMUSCULAR; INTRAVENOUS ONCE AS NEEDED
Status: DISCONTINUED | OUTPATIENT
Start: 2019-11-07 | End: 2019-11-07

## 2019-11-07 RX ADMIN — MORPHINE SULFATE 4 MG: 4 INJECTION INTRAVENOUS at 07:30

## 2019-11-07 RX ADMIN — SODIUM CHLORIDE, PRESERVATIVE FREE 10 ML: 5 INJECTION INTRAVENOUS at 20:17

## 2019-11-07 RX ADMIN — PROPOFOL 25 MCG/KG/MIN: 10 INJECTION, EMULSION INTRAVENOUS at 11:19

## 2019-11-07 RX ADMIN — PROPOFOL 50 MCG/KG/MIN: 10 INJECTION, EMULSION INTRAVENOUS at 05:48

## 2019-11-07 RX ADMIN — SODIUM CHLORIDE, PRESERVATIVE FREE 10 ML: 5 INJECTION INTRAVENOUS at 20:16

## 2019-11-07 RX ADMIN — SODIUM CHLORIDE, PRESERVATIVE FREE 10 ML: 5 INJECTION INTRAVENOUS at 10:00

## 2019-11-07 RX ADMIN — IPRATROPIUM BROMIDE AND ALBUTEROL SULFATE 3 ML: .5; 3 SOLUTION RESPIRATORY (INHALATION) at 13:03

## 2019-11-07 RX ADMIN — DEXMEDETOMIDINE HYDROCHLORIDE 0.3 MCG/KG/HR: 4 INJECTION, SOLUTION INTRAVENOUS at 09:59

## 2019-11-07 RX ADMIN — FAMOTIDINE 20 MG: 10 INJECTION, SOLUTION INTRAVENOUS at 10:00

## 2019-11-07 RX ADMIN — SODIUM CHLORIDE, PRESERVATIVE FREE 10 ML: 5 INJECTION INTRAVENOUS at 10:06

## 2019-11-07 RX ADMIN — CHLOROTHIAZIDE SODIUM 500 MG: 500 INJECTION INTRAVENOUS at 12:57

## 2019-11-07 RX ADMIN — SODIUM CHLORIDE, PRESERVATIVE FREE 10 ML: 5 INJECTION INTRAVENOUS at 10:08

## 2019-11-07 RX ADMIN — CHLORHEXIDINE GLUCONATE 0.12% ORAL RINSE 15 ML: 1.2 LIQUID ORAL at 10:00

## 2019-11-07 RX ADMIN — ALTEPLASE: 2.2 INJECTION, POWDER, LYOPHILIZED, FOR SOLUTION INTRAVENOUS at 14:47

## 2019-11-07 RX ADMIN — PROPOFOL 50 MCG/KG/MIN: 10 INJECTION, EMULSION INTRAVENOUS at 02:37

## 2019-11-07 RX ADMIN — DEXMEDETOMIDINE HYDROCHLORIDE 0.75 MCG/KG/HR: 4 INJECTION, SOLUTION INTRAVENOUS at 09:12

## 2019-11-07 RX ADMIN — FENTANYL CITRATE 300 MCG/HR: 50 INJECTION INTRAVENOUS at 07:54

## 2019-11-07 RX ADMIN — CHLOROTHIAZIDE SODIUM 500 MG: 500 INJECTION INTRAVENOUS at 20:15

## 2019-11-07 RX ADMIN — HUMAN INSULIN 2 UNITS: 100 INJECTION, SOLUTION SUBCUTANEOUS at 07:23

## 2019-11-07 RX ADMIN — MULTIVITAMIN 15 ML: LIQUID ORAL at 10:00

## 2019-11-07 RX ADMIN — MIDAZOLAM HYDROCHLORIDE 2 MG: 1 INJECTION, SOLUTION INTRAMUSCULAR; INTRAVENOUS at 13:20

## 2019-11-07 RX ADMIN — FENTANYL CITRATE 150 MCG/HR: 50 INJECTION INTRAVENOUS at 11:19

## 2019-11-07 RX ADMIN — LORAZEPAM 0.5 MG: 2 INJECTION, SOLUTION INTRAMUSCULAR; INTRAVENOUS at 23:39

## 2019-11-07 RX ADMIN — ENOXAPARIN SODIUM 40 MG: 40 INJECTION SUBCUTANEOUS at 23:39

## 2019-11-07 RX ADMIN — FENTANYL CITRATE 150 MCG/HR: 50 INJECTION INTRAVENOUS at 11:23

## 2019-11-07 RX ADMIN — CEFTRIAXONE 2 G: 2 INJECTION, SOLUTION INTRAVENOUS at 12:57

## 2019-11-07 RX ADMIN — FENTANYL CITRATE 300 MCG/HR: 50 INJECTION INTRAVENOUS at 00:28

## 2019-11-07 RX ADMIN — DEXMEDETOMIDINE HYDROCHLORIDE 1.41 MCG/KG/HR: 4 INJECTION, SOLUTION INTRAVENOUS at 05:47

## 2019-11-07 RX ADMIN — DEXMEDETOMIDINE HYDROCHLORIDE 1.41 MCG/KG/HR: 4 INJECTION, SOLUTION INTRAVENOUS at 01:27

## 2019-11-07 RX ADMIN — IPRATROPIUM BROMIDE AND ALBUTEROL SULFATE 3 ML: .5; 3 SOLUTION RESPIRATORY (INHALATION) at 07:15

## 2019-11-07 RX ADMIN — Medication 1 CAPSULE: at 10:00

## 2019-11-07 RX ADMIN — FENTANYL CITRATE 300 MCG/HR: 50 INJECTION INTRAVENOUS at 03:52

## 2019-11-07 RX ADMIN — SODIUM CHLORIDE, PRESERVATIVE FREE 10 ML: 5 INJECTION INTRAVENOUS at 10:09

## 2019-11-07 RX ADMIN — ACETAMINOPHEN 649.6 MG: 650 SOLUTION ORAL at 06:52

## 2019-11-07 RX ADMIN — TAZOBACTAM SODIUM AND PIPERACILLIN SODIUM 4.5 G: 500; 4 INJECTION, SOLUTION INTRAVENOUS at 04:19

## 2019-11-07 RX ADMIN — IPRATROPIUM BROMIDE AND ALBUTEROL SULFATE 3 ML: .5; 3 SOLUTION RESPIRATORY (INHALATION) at 00:30

## 2019-11-07 RX ADMIN — IPRATROPIUM BROMIDE AND ALBUTEROL SULFATE 3 ML: .5; 3 SOLUTION RESPIRATORY (INHALATION) at 19:32

## 2019-11-07 RX ADMIN — MORPHINE SULFATE 2 MG: 2 INJECTION, SOLUTION INTRAMUSCULAR; INTRAVENOUS at 22:32

## 2019-11-07 NOTE — PLAN OF CARE
Problem: Patient Care Overview  Goal: Plan of Care Review  Outcome: Ongoing (interventions implemented as appropriate)   11/07/19 1748   Coping/Psychosocial   Plan of Care Reviewed With patient;family     Goal: Individualization and Mutuality  Outcome: Ongoing (interventions implemented as appropriate)    Goal: Discharge Needs Assessment  Outcome: Ongoing (interventions implemented as appropriate)    Goal: Interprofessional Rounds/Family Conf  Outcome: Ongoing (interventions implemented as appropriate)      Problem: Skin Injury Risk (Adult)  Goal: Skin Health and Integrity  Outcome: Ongoing (interventions implemented as appropriate)      Problem: Pneumonia (Adult)  Goal: Signs and Symptoms of Listed Potential Problems Will be Absent, Minimized or Managed (Pneumonia)  Outcome: Ongoing (interventions implemented as appropriate)      Problem: Fall Risk (Adult)  Goal: Identify Related Risk Factors and Signs and Symptoms  Outcome: Ongoing (interventions implemented as appropriate)      Problem: Nutrition, Enteral (Adult)  Goal: Signs and Symptoms of Listed Potential Problems Will be Absent, Minimized or Managed (Nutrition, Enteral)  Outcome: Outcome(s) achieved Date Met: 11/07/19

## 2019-11-07 NOTE — PROGRESS NOTES
"  CC: Acute Respiratory Failure.     S: Intubated and sedated.  S/P Bronchoscopy.     ROS: Could not be obtained as the patient is on mechanical ventilator.    O:Vital signs reviewed.  FiO2: 55%. CVP Line. Day # 8. Vent Day # 8  /72   Pulse 89   Temp (!) 101.1 °F (38.4 °C) (Oral)   Resp (!) 30   Ht 170.2 cm (67.01\")   Wt 75 kg (165 lb 4.8 oz)   SpO2 90%   BMI 25.88 kg/m²     Temp (24hrs), Av.6 °F (38.1 °C), Min:99.8 °F (37.7 °C), Max:101.3 °F (38.5 °C)      I & Os reviewed.   Intake/Output       19 0700 - 19 0659    Intake (ml) 2900    Output (ml) 2280    Net (ml) 620    Last Weight  75 kg (165 lb 4.8 oz)          General/Constitutional: Intubated. Sedated.  Eyes: PERRL. 2-3 mm.   Neck: Supple without JVD. No obvious masses noted. Left IJ CVP line in place.   Cardiovascular: S1 + S2. Regular.    Lungs/Respiratory: Transmitted Breath sounds noted. No wheezing heard. Right > Left crackles noted.  GI/Abdomen: Soft. Bowel sounds positive. No organomegaly noted.  Musculoskeletal/Extremities: Trace edema noted. Gait could not be assessed at this time, as the patient was laying in bed.   Neurologic: Sedated. Detailed exam couldn't be performed due to sedation.    Psych: Could not be performed as he is currently sedated.   Skin: Appeared with diffuse scattered ulcerations over upper and lower extremities.     Labs: Reviewed.   Results from last 7 days   Lab Units 19  0404 19  0407 19  0439  19  0421   SODIUM mmol/L 143 146* 147*   < > 142   POTASSIUM mmol/L 3.5 3.8 3.9   < > 3.9   CHLORIDE mmol/L 103 104 109*   < > 114*   CO2 mmol/L 30.7* 32.1* 28.1   < > 22.6   BUN mg/dL 31* 31* 25*   < > 27*   CREATININE mg/dL 0.85 0.95 0.75*   < > 0.83   CALCIUM mg/dL 8.5* 8.3* 8.2*   < > 8.2*   BILIRUBIN mg/dL 0.7  --   --   --  0.3   ALK PHOS U/L 253*  --   --   --  135*   ALT (SGPT) U/L 17  --   --   --  17   AST (SGOT) U/L 38  --   --   --  21   GLUCOSE mg/dL 128* 129* 109*   < > " 128*    < > = values in this interval not displayed.             Results from last 7 days   Lab Units 11/07/19  0404 11/06/19  0407 11/05/19  0439 11/04/19  0404 11/03/19  0359   WBC 10*3/mm3 10.33 8.91 9.84 11.00* 14.62*   NEUTROPHIL % % 69.7 57.7 64.0 73.0 82.7*   HEMOGLOBIN g/dL 8.3* 8.1* 8.6* 9.1* 9.6*   HEMATOCRIT % 27.4* 26.8* 27.4* 29.7* 31.2*   PLATELETS 10*3/mm3 240 219 231 241 273             Lab Results   Component Value Date    PROCALCITO 2.72 (H) 11/05/2019    PROCALCITO 7.46 (H) 11/03/2019    PROCALCITO 8.30 (H) 10/30/2019       Lab Results   Component Value Date    PROBNP 385.2 10/30/2019         dexmedetomidine 0.2-1.5 mcg/kg/hr Last Rate: 1.414 mcg/kg/hr (11/07/19 0547)   fentaNYL (SUBLIMAZE) infusion 10 mcg/ml  mcg/hr Last Rate: 300 mcg/hr (11/07/19 0754)   Pharmacy to Dose Zosyn     propofol 5-50 mcg/kg/min Last Rate: 50 mcg/kg/min (11/07/19 0548)         Micro: As of November 7, 2019   Lab Results   Component Value Date    RESPCX Scant growth (1+) Acinetobacter baumannii (A) 11/03/2019    RESPCX Light growth (2+) Candida albicans (A) 11/03/2019    RESPCX Scant growth (1+) Citrobacter koseri (A) 11/03/2019    RESPCX No Normal Respiratory Dunia (A) 11/03/2019     Lab Results   Component Value Date    BLOODCX No growth at 2 days 11/04/2019    BLOODCX No growth at 2 days 11/04/2019       Lab Results   Component Value Date    MRSACX  10/31/2019     No Methicillin Resistant Staphylococcus aureus isolated       ABG: Reviewed.  Lab Results   Component Value Date    PHART 7.420 11/06/2019    YUY4TWK 54.4 (H) 11/06/2019    PO2ART 73.5 (L) 11/06/2019    HGBBG 8.6 (L) 11/06/2019    V9ZGMIHC 95.2 11/06/2019    CARBOXYHGB 1.0 11/06/2019       CXRay: Latest imaging study was reviewed personally.   Imaging Results (Last 24 Hours)     Procedure Component Value Units Date/Time    XR Chest 1 View [[id number]] Collected:  11/07/19 0927     Updated:  11/07/19 0932    Narrative:       PORTABLE CHEST      INDICATION: Respiratory failure. Follow up infiltrates.     FINDINGS: Single frontal portable chest, compared with 11/06/2019. EKG  leads overlie the chest. Support tubes and lines remain in place. No  pneumothorax. Bilateral parenchymal infiltrates are again identified.  These appear similar to subtly improved. No other interval change.       Impression:       Bilateral infiltrates are similar to subtly improved.  Continued follow-up recommended.     This report was finalized on 11/7/2019 9:30 AM by Alejandro Stewart MD.          Assessment & Recommendations/Plan:   1.  Acute Respiratory Failure  On mechanical ventilation.   On Precedex, Fentanyl and Propofol.   I have asked the nursing staff to wean Precedex off.   Bronchoscopy was performed yesterday.  Showed significant secretions.  Chest x-ray will be repeated in the morning.  Will administer Diuril again today.  If the patient is able to follow some commands, we will consider spontaneous breathing trial once again today.    2.  Left sided ?aspiration? Pneumonia.   Procalcitonin level decreasing.    Will repeat procalcitonin level in 48 hours or so.  Sputum culture showing Acinetobacter and Citrobacter.  Cultures were sent from bronchoscopy yesterday.  Vancomycin discontinued.  We will switch antibiotics to ceftriaxone, for another 2 days given significantly abnormal chest x-ray, worsening FiO2 and continued secretions.    3.  Shock and respiratory acidosis  Resolved    4.  Drug overdose  Toxicology screen was positive for multiple agents.    5.  Anemia  Likely dilutional.   Will start Lasix IV drip.     6.  Nutrition  Has been able to tolerate the tube feeds.  Currently on hold for possible extubation trial today.    7.   Fever.  Continue antibiotics.  Blood Cultures sent on 11/6/19.     Will ask for nursing staff to try condom catheter to allow removal of Tinoco catheter, since it has been in place for 7 days or more.    The patient has failed extubation on  multiple days and continues to have high FiO2 requirements.  If the patient continues to fail extubation then tracheostomy may need to be considered.  Apparently this has been discussed by the family members and they are not entirely sure if they want to proceed with tracheostomy.    Critical Care time spent in direct patient care: 40 minutes including high complexity decision making to assess, manipulate, and support vital organ system failure in this individual who has impairment of one or more vital organ systems such that there is a high probability of imminent or life threatening deterioration in the patient’s condition. This time excludes other billable procedures. Time does include preparation of documents, review of old records, and direct bedside care.    We have reviewed patient's current orders and changes, if any, have been suggested to primary care team. Plan was also discussed with nursing staff, as necessary.     This document was electronically signed by Bria Tamayo MD on 11/07/19 at 8:52 AM      Dictated utilizing Dragon dictation.

## 2019-11-07 NOTE — PROGRESS NOTES
Adult Nutrition  Assessment/PES    Patient Name:  Eddie Prieto  YOB: 1975  MRN: [id number]  Admit Date:  10/30/2019    Assessment Date:  11/7/2019    Comments:  Rec. #1: Continue NPO while intubated. Continue with current tube feeding regimen. Isosource 1.5 via NG. Pt is at goal rate of 40 mL/hr providing 1320 kcals, 59.8 gm protein, and 672 mL tube feeding water. Free water flushes 100 mL Q4 hours or six times daily. Rec. #2: Continue with MVI. RD to follow pt. Consult RD PRN.      Reason for Assessment     Row Name 11/07/19 1124          Reason for Assessment    Reason For Assessment  TF/PN;follow-up protocol     Diagnosis  substance use/abuse;metabolic state;infection/sepsis;pulmonary disease;other (see comments) Intubated, Recreational drug use, Toxic encephalopathy, Sepsis, PNA, Multiple ulcers     Identified At Risk by Screening Criteria  tube feeding or parenteral nutrition           Anthropometrics     Row Name 11/07/19 0337          Anthropometrics    Weight  75 kg (165 lb 4.8 oz)         Labs/Tests/Procedures/Meds     Row Name 11/07/19 1124          Labs/Procedures/Meds    Lab Results Reviewed  reviewed, pertinent     Lab Results Comments  Low: Alb High: Gluc, BUN        Medications    Pertinent Medications Reviewed  reviewed     Pertinent Medications Comments  Propofol, MVI with Iron-minerals         Physical Findings     Row Name 11/07/19 1126          Physical Findings    Overall Physical Appearance  on ventilator support     Tubes  nasogastric tube           Nutrition Prescription Ordered     Row Name 11/07/19 1126          Nutrition Prescription PO    Current PO Diet  NPO Pt on vent and receiving EN        Nutrition Prescription EN    Enteral Route  NG     Product  Isosource 1.5 (Jevity 1.5)     TF Delivery Method  Continuous     Continuous TF Goal Rate (mL/hr)  40 mL/hr     Continuous TF Current Rate (mL/hr)  0 mL/hr 40     Continuous TF Goal Volume (mL)  880 mL     Continuous  TF Current Volume (mL)  880 mL     Water flush (mL)   100 mL     Water Flush Frequency  Every 4 hours        Propofol Considerations    Propofol (mL/hr)  21.6 mL/hr     Propofol (Kcal/day)  570.24 Kcal/day         Evaluation of Received Nutrient/Fluid Intake     Row Name 11/07/19 1127          PO Evaluation    Number of Days PO Intake Evaluated  Insufficient Data Pt NPO while receiving EN        EN Evaluation    Number of Days EN Intake Evaluated  3 days     EN Average Volume Delivered (mL/day)  880 mL/day     % Goal Volume   100 %     HOB  Greater than or equal to 30 degress               Problem/Interventions:  Problem 1     Row Name 11/07/19 1128          Nutrition Diagnoses Problem 1    Problem 1  Increased Nutrient Needs     Macronutrient  Kcal;Fluid;Protein     Etiology (related to)  Medical Diagnosis     Infectious Disease  Sepsis     Signs/Symptoms (evidenced by)  Other (comment) Infection         Problem 2     Row Name 11/07/19 1129          Nutrition Diagnoses Problem 2    Problem 2  Inadequate Intake/Infusion     Inadequate Intake Type  Oral     Macronutrient  Kcal;Fluid;Fiber;Protein;Fat;Carbohydrate     Micronutrient  Vitamin;Mineral     Etiology (related to)  MNT for Treatment/Condition     Signs/Symptoms (evidenced by)  NPO             Intervention Goal     Row Name 11/07/19 1129          Intervention Goal    General  Meet nutritional needs for age/condition     PO  Other (comment) Continue NPO diet order while pt is intubated and receiving EN     TF/PN  Maintain TF/PN     Transition  TF to PO     Weight  Maintain weight         Nutrition Intervention     Row Name 11/07/19 1129          Nutrition Intervention    RD/Tech Action  Follow Tx progress;Await begin PO         Nutrition Prescription     Row Name 11/07/19 1130          Nutrition Prescription PO    PO Prescription  Other (comment) Continue NPO diet order while pt is intubated and receiving EN     New PO Prescription Ordered?  No, recommended         Nutrition Prescription EN    Enteral Prescription  Continue same protocol TF managed by Pulmonologist        Other Orders    Obtain Weight  Daily     Obtain Weight Ordered?  No, recommended     Supplement  Vitamin mineral supplement Continue with MVI     Supplement Ordered?  No, recommended         Education/Evaluation     Row Name 11/07/19 1131          Education    Education  Education not appropriate at this time     Please explain  Patient intubated        Monitor/Evaluation    Monitor  Per protocol;I&O;Pertinent labs;TF delivery/tolerance;Weight           Electronically signed by:  Viad Hill RD  11/07/19 11:31 AM

## 2019-11-07 NOTE — PROGRESS NOTES
[facility]    [facility]    Name:  Eddie Prieto   Age:  44 y.o.  Sex:  male  :  1975  MRN:  [id number]   Visit Number:  [id number]  Admission Date:  10/30/2019  Date Of Service:  19  Primary Care Physician:  Shivani Canales APRN     LOS: 8 days :  Patient Care Team:  Shivani Canales APRN as PCP - General (Family Medicine):    Chief Complaint:      Follow-up on respiratory failure, septic shock, drug overdose    Subjective / Interval History:     Patient remains intubated and sedated in the ICU.  Nursing staff, still having intermittent fevers.  Social work has talked with family members this morning as well.    Prior work-up:  44-year-old male with history of drug abuse was brought to the emergency room by EMS after they were called by someone who was driving the patient around stating that the patient became unresponsive.  The history is obtained from the emergency room physician and the medical record.  The patient was given 2 mg of IV Narcan by EMS with some improvement in his mental status but he was agitated and confused.  When he came to the emergency room he was ashen looking, diaphoretic and cyanotic with minimal respiratory effort.  Patient was given 2 more milligram of Narcan while getting Ambu bag ventilation with minimal response.  His oxygen saturation was 36% in the emergency room and he was subsequently intubated in the emergency room.  Patient required pressors on day of admission due to hypotension.  He was significantly tachycardic and was started on phenylephrine.  This was able to be titrated down over the following day his sinus tachycardia resolved and blood pressure stabilized.  Patient started on broad-spectrum antibiotic's with vancomycin and Zosyn.  Cultures obtained.    Patient has been on mechanical ventilation since 10/30/2019.  He has had multiple attempts to extubate, with failure.  He is underwent bronchoscopy on  11/6/2019 with removal of significant mucus/secretions.  He is having intermittent fevers.  Has been on vancomycin and Zosyn, on Zosyn alone since yesterday.    Review of Systems:     Complete review of systems unobtainable secondary to patient's intubated and sedated status    Vital Signs:    Temp:  [99.6 °F (37.6 °C)-101.3 °F (38.5 °C)] 99.6 °F (37.6 °C)  Heart Rate:  [57-90] 90  Resp:  [15-30] 30  BP: ()/(51-75) 128/72  FiO2 (%):  [55 %] 55 %    Intake and output:    I/O last 3 completed shifts:  In: 5162 [I.V.:3460; Other:661; NG/GT:1041]  Out: 4470 [Urine:4470]  No intake/output data recorded.    Physical Examination:    General Appearance:   Sedated, intubated   Head:  Atraumatic and normocephalic, without obvious abnormality.   Eyes:          PERRLA, conjunctivae and sclerae normal, no Icterus. No pallor.    Neck: Supple, trachea midline, no thyromegaly.  Left IJ CVP line.   Lungs:   Breath sounds heard bilaterally equally.  Bilateral crackles unchanged.   Heart:  Normal S1 and S2, no murmur, no gallop, no rub. No JVD   Abdomen:   Normal bowel sounds, no masses, no organomegaly. Soft, non-tender, non-distended, no guarding, no rebound tenderness.   Extremities: Moves all extremities well, trace edema, no cyanosis, no clubbing.   Skin: No bleeding, bruising or rash.   Neurologic:  Sedated and no clonus.     Laboratory results:    Results from last 7 days   Lab Units 11/07/19  0404 11/06/19  0407 11/05/19  0439  11/02/19  0421   SODIUM mmol/L 143 146* 147*   < > 142   POTASSIUM mmol/L 3.5 3.8 3.9   < > 3.9   CHLORIDE mmol/L 103 104 109*   < > 114*   CO2 mmol/L 30.7* 32.1* 28.1   < > 22.6   BUN mg/dL 31* 31* 25*   < > 27*   CREATININE mg/dL 0.85 0.95 0.75*   < > 0.83   CALCIUM mg/dL 8.5* 8.3* 8.2*   < > 8.2*   BILIRUBIN mg/dL 0.7  --   --   --  0.3   ALK PHOS U/L 253*  --   --   --  135*   ALT (SGPT) U/L 17  --   --   --  17   AST (SGOT) U/L 38  --   --   --  21   GLUCOSE mg/dL 128* 129* 109*   < > 128*     < > = values in this interval not displayed.     Results from last 7 days   Lab Units 11/07/19  0404 11/06/19  0407 11/05/19  0439   WBC 10*3/mm3 10.33 8.91 9.84   HEMOGLOBIN g/dL 8.3* 8.1* 8.6*   HEMATOCRIT % 27.4* 26.8* 27.4*   PLATELETS 10*3/mm3 240 219 231             Results from last 7 days   Lab Units 11/04/19  1135 11/04/19  1130   BLOODCX  No growth at 3 days No growth at 3 days       I have reviewed the patient's laboratory results.    Radiology results:    Imaging Results (Last 24 Hours)     Procedure Component Value Units Date/Time    XR Chest 1 View [[id number]] Collected:  11/07/19 0927     Updated:  11/07/19 0932    Narrative:       PORTABLE CHEST     INDICATION: Respiratory failure. Follow up infiltrates.     FINDINGS: Single frontal portable chest, compared with 11/06/2019. EKG  leads overlie the chest. Support tubes and lines remain in place. No  pneumothorax. Bilateral parenchymal infiltrates are again identified.  These appear similar to subtly improved. No other interval change.       Impression:       Bilateral infiltrates are similar to subtly improved.  Continued follow-up recommended.     This report was finalized on 11/7/2019 9:30 AM by Alejandro Stewart MD.          I have reviewed the patient's radiology reports.    Medication Review:     I have reviewed the patients active and prn medications.       Acute respiratory failure with hypoxia and hypercapnia (CMS/HCC)    Accidental drug overdose    Toxic encephalopathy    Sepsis (CMS/HCC)    Aspiration pneumonia of both lower lobes due to vomit (CMS/HCC)    Tobacco dependence    Drug abuse (CMS/HCC)    Hyperglycemia      Assessment:    Acute toxic encephalopathy, present on admission.  Acute hypoxic and hypercapnic respiratory failure secondary to #2, present on admission.  Acute drug overdose with opiates and methamphetamine, present on admission.  Septic Shock secondary to pna, present on admission.  Bilateral lower lobe aspiration pneumonia,  present on admission.  Acute kidney injury likely secondary to ATN from hypotension due to septic shock-resolved  Hyperglycemia, likely secondary to diabetes, present on admission. - resolved  Hypertensive urgency, present on admission - resolved  Nicotine dependence.  Recreational drug abuse.  Multiple skin ulcers, present on admission.    Plan:    Patient on day 8 of mechanical ventilation.  Status post bronchoscopy yesterday with removal of significant secretions.  T-max 101.3 since yesterday.  Blood pressure 128/72.  FiO2 55%.  CBC white blood cell count of 10,000.  Creatinine 0.85.  Alk phos elevated 253.  Repeat blood cultures this morning pending.  1 view chest x-ray with with likely unchanged bilateral infiltrates per my interpretation.    Sputum obtained on 11/3/2019 with scant Acinetobacter baumannii, citrobacter koseri.  Sensitive to Zosyn.  MRSA swab negative.    Neurology following and appreciate recommendations and management.  Will attempt to decrease sedation today and monitor response.  Will continue Diuril at 500 every 6.  Lasix drip.  Has been on Precedex and fentanyl addition of propofol.  He is going to switch antibiotics to Rocephin today.  Tube feeds on hold for extubation trial.    I am going to add an abdominal ultrasound today due to recurrent fevers in setting of critical illness patient.    Patient continues to have significant multiorgan failure.  Case management discussing with patients family in regards to potential need for tracheostomy and long-term care.  We will review this further.  Anticipate prolonged hospitalization at this point.    Ange Esquivel DO  11/07/19  12:16 PM    Dictated utilizing Dragon dictation.

## 2019-11-07 NOTE — PLAN OF CARE
Problem: Ventilation, Mechanical Invasive (Adult)  Goal: Signs and Symptoms of Listed Potential Problems Will be Absent, Minimized or Managed (Ventilation, Mechanical Invasive)  Outcome: Ongoing (interventions implemented as appropriate)   11/07/19 0858   Goal/Outcome Evaluation   Problems Assessed (Mechanical Ventilation, Invasive) all   Problems Present (Mech Vent, Invasive) none      11/07/19 0858   Goal/Outcome Evaluation   Problems Assessed (Mechanical Ventilation, Invasive) all   Problems Present (Mech Vent, Invasive) none

## 2019-11-07 NOTE — PROGRESS NOTES
Continued Stay Note   [facility]     Patient Name: Eddie Prieto  MRN: [id number]  Today's Date: 2019    Admit Date: 10/30/2019    Discharge Plan     Row Name 19 0953       Plan    Plan  Spoke to pt.'s sisters Brandy Walker and Raine Golden. There are 5 other siblings, Reinier Prieto, wanda Interiano and Teresita, Rosendo and Wilfrid Prieto. According to sisters pt. is not , his children are minors and his parents are . Discussed tentative discharge planning. Pt. still medically being treated and discharge planning is being followed.         Discharge Codes    No documentation.       Expected Discharge Date and Time     Expected Discharge Date Expected Discharge Time    2019             Scarlett Acevedo LCSW

## 2019-11-08 ENCOUNTER — APPOINTMENT (OUTPATIENT)
Dept: GENERAL RADIOLOGY | Facility: HOSPITAL | Age: 44
End: 2019-11-08

## 2019-11-08 LAB
ALBUMIN SERPL-MCNC: 2.7 G/DL (ref 3.5–5.2)
ALBUMIN/GLOB SERPL: 0.5 G/DL
ALP SERPL-CCNC: 315 U/L (ref 39–117)
ALT SERPL W P-5'-P-CCNC: 25 U/L (ref 1–41)
ANION GAP SERPL CALCULATED.3IONS-SCNC: 12.3 MMOL/L (ref 5–15)
AST SERPL-CCNC: 59 U/L (ref 1–40)
BASOPHILS # BLD AUTO: 0.11 10*3/MM3 (ref 0–0.2)
BASOPHILS NFR BLD AUTO: 0.5 % (ref 0–1.5)
BILIRUB SERPL-MCNC: 0.7 MG/DL (ref 0.2–1.2)
BUN BLD-MCNC: 21 MG/DL (ref 6–20)
BUN/CREAT SERPL: 36.8 (ref 7–25)
CALCIUM SPEC-SCNC: 8.9 MG/DL (ref 8.6–10.5)
CHLORIDE SERPL-SCNC: 103 MMOL/L (ref 98–107)
CO2 SERPL-SCNC: 28.7 MMOL/L (ref 22–29)
CREAT BLD-MCNC: 0.57 MG/DL (ref 0.76–1.27)
DEPRECATED RDW RBC AUTO: 42.1 FL (ref 37–54)
EOSINOPHIL # BLD AUTO: 0 10*3/MM3 (ref 0–0.4)
EOSINOPHIL NFR BLD AUTO: 0 % (ref 0.3–6.2)
ERYTHROCYTE [DISTWIDTH] IN BLOOD BY AUTOMATED COUNT: 14.6 % (ref 12.3–15.4)
GFR SERPL CREATININE-BSD FRML MDRD: >150 ML/MIN/1.73
GLOBULIN UR ELPH-MCNC: 5.4 GM/DL
GLUCOSE BLD-MCNC: 124 MG/DL (ref 65–99)
GLUCOSE BLDC GLUCOMTR-MCNC: 101 MG/DL (ref 70–130)
GLUCOSE BLDC GLUCOMTR-MCNC: 103 MG/DL (ref 70–130)
GLUCOSE BLDC GLUCOMTR-MCNC: 122 MG/DL (ref 70–130)
GLUCOSE BLDC GLUCOMTR-MCNC: 138 MG/DL (ref 70–130)
GLUCOSE BLDC GLUCOMTR-MCNC: 94 MG/DL (ref 70–130)
GLUCOSE BLDC GLUCOMTR-MCNC: 97 MG/DL (ref 70–130)
HCT VFR BLD AUTO: 32.3 % (ref 37.5–51)
HGB BLD-MCNC: 10.2 G/DL (ref 13–17.7)
IMM GRANULOCYTES # BLD AUTO: 0.56 10*3/MM3 (ref 0–0.05)
IMM GRANULOCYTES NFR BLD AUTO: 2.6 % (ref 0–0.5)
LYMPHOCYTES # BLD AUTO: 1.77 10*3/MM3 (ref 0.7–3.1)
LYMPHOCYTES NFR BLD AUTO: 8.2 % (ref 19.6–45.3)
MCH RBC QN AUTO: 25 PG (ref 26.6–33)
MCHC RBC AUTO-ENTMCNC: 31.6 G/DL (ref 31.5–35.7)
MCV RBC AUTO: 79.2 FL (ref 79–97)
MONOCYTES # BLD AUTO: 0.9 10*3/MM3 (ref 0.1–0.9)
MONOCYTES NFR BLD AUTO: 4.2 % (ref 5–12)
NEUTROPHILS # BLD AUTO: 18.31 10*3/MM3 (ref 1.7–7)
NEUTROPHILS NFR BLD AUTO: 84.5 % (ref 42.7–76)
NRBC BLD AUTO-RTO: 0 /100 WBC (ref 0–0.2)
PLATELET # BLD AUTO: 452 10*3/MM3 (ref 140–450)
PMV BLD AUTO: 10.5 FL (ref 6–12)
POTASSIUM BLD-SCNC: 3.5 MMOL/L (ref 3.5–5.2)
PROCALCITONIN SERPL-MCNC: 1.16 NG/ML (ref 0.1–0.25)
PROT SERPL-MCNC: 8.1 G/DL (ref 6–8.5)
RBC # BLD AUTO: 4.08 10*6/MM3 (ref 4.14–5.8)
SODIUM BLD-SCNC: 144 MMOL/L (ref 136–145)
WBC NRBC COR # BLD: 21.65 10*3/MM3 (ref 3.4–10.8)

## 2019-11-08 PROCEDURE — 84145 PROCALCITONIN (PCT): CPT | Performed by: INTERNAL MEDICINE

## 2019-11-08 PROCEDURE — 94799 UNLISTED PULMONARY SVC/PX: CPT

## 2019-11-08 PROCEDURE — 80053 COMPREHEN METABOLIC PANEL: CPT | Performed by: FAMILY MEDICINE

## 2019-11-08 PROCEDURE — 25010000002 CEFTRIAXONE SODIUM-DEXTROSE 2-2.22 GM-%(50ML) RECONSTITUTED SOLUTION: Performed by: INTERNAL MEDICINE

## 2019-11-08 PROCEDURE — 85025 COMPLETE CBC W/AUTO DIFF WBC: CPT | Performed by: FAMILY MEDICINE

## 2019-11-08 PROCEDURE — 82962 GLUCOSE BLOOD TEST: CPT

## 2019-11-08 PROCEDURE — 99232 SBSQ HOSP IP/OBS MODERATE 35: CPT | Performed by: FAMILY MEDICINE

## 2019-11-08 PROCEDURE — 99233 SBSQ HOSP IP/OBS HIGH 50: CPT | Performed by: INTERNAL MEDICINE

## 2019-11-08 PROCEDURE — 71045 X-RAY EXAM CHEST 1 VIEW: CPT

## 2019-11-08 PROCEDURE — 25010000002 MIDAZOLAM PER 1 MG: Performed by: INTERNAL MEDICINE

## 2019-11-08 PROCEDURE — 25010000002 LORAZEPAM PER 2 MG: Performed by: FAMILY MEDICINE

## 2019-11-08 PROCEDURE — 25010000002 MORPHINE PER 10 MG: Performed by: INTERNAL MEDICINE

## 2019-11-08 PROCEDURE — 92610 EVALUATE SWALLOWING FUNCTION: CPT

## 2019-11-08 RX ORDER — MORPHINE SULFATE 4 MG/ML
4 INJECTION, SOLUTION INTRAMUSCULAR; INTRAVENOUS EVERY 4 HOURS PRN
Status: DISCONTINUED | OUTPATIENT
Start: 2019-11-08 | End: 2019-11-12 | Stop reason: HOSPADM

## 2019-11-08 RX ORDER — MIDAZOLAM HYDROCHLORIDE 1 MG/ML
2 INJECTION INTRAMUSCULAR; INTRAVENOUS EVERY 6 HOURS PRN
Status: DISCONTINUED | OUTPATIENT
Start: 2019-11-08 | End: 2019-11-11

## 2019-11-08 RX ORDER — MIDAZOLAM HYDROCHLORIDE 1 MG/ML
2 INJECTION INTRAMUSCULAR; INTRAVENOUS EVERY 6 HOURS PRN
Status: DISCONTINUED | OUTPATIENT
Start: 2019-11-08 | End: 2019-11-08

## 2019-11-08 RX ADMIN — MULTIVITAMIN 15 ML: LIQUID ORAL at 10:51

## 2019-11-08 RX ADMIN — MORPHINE SULFATE 4 MG: 4 INJECTION INTRAVENOUS at 21:25

## 2019-11-08 RX ADMIN — LORAZEPAM 0.5 MG: 2 INJECTION, SOLUTION INTRAMUSCULAR; INTRAVENOUS at 04:46

## 2019-11-08 RX ADMIN — CEFTRIAXONE 2 G: 2 INJECTION, SOLUTION INTRAVENOUS at 13:20

## 2019-11-08 RX ADMIN — MORPHINE SULFATE 4 MG: 4 INJECTION INTRAVENOUS at 17:38

## 2019-11-08 RX ADMIN — METOPROLOL TARTRATE 5 MG: 1 INJECTION, SOLUTION INTRAVENOUS at 10:51

## 2019-11-08 RX ADMIN — SODIUM CHLORIDE, PRESERVATIVE FREE 10 ML: 5 INJECTION INTRAVENOUS at 10:57

## 2019-11-08 RX ADMIN — IPRATROPIUM BROMIDE AND ALBUTEROL SULFATE 3 ML: .5; 3 SOLUTION RESPIRATORY (INHALATION) at 19:47

## 2019-11-08 RX ADMIN — SODIUM CHLORIDE, PRESERVATIVE FREE 10 ML: 5 INJECTION INTRAVENOUS at 10:55

## 2019-11-08 RX ADMIN — MIDAZOLAM HYDROCHLORIDE 2 MG: 1 INJECTION, SOLUTION INTRAMUSCULAR; INTRAVENOUS at 13:17

## 2019-11-08 RX ADMIN — IPRATROPIUM BROMIDE AND ALBUTEROL SULFATE 3 ML: .5; 3 SOLUTION RESPIRATORY (INHALATION) at 01:09

## 2019-11-08 RX ADMIN — IPRATROPIUM BROMIDE AND ALBUTEROL SULFATE 3 ML: .5; 3 SOLUTION RESPIRATORY (INHALATION) at 12:42

## 2019-11-08 RX ADMIN — Medication 1 CAPSULE: at 21:26

## 2019-11-08 RX ADMIN — METOPROLOL TARTRATE 5 MG: 1 INJECTION, SOLUTION INTRAVENOUS at 17:37

## 2019-11-08 RX ADMIN — SODIUM CHLORIDE, PRESERVATIVE FREE 10 ML: 5 INJECTION INTRAVENOUS at 10:56

## 2019-11-08 RX ADMIN — LABETALOL 20 MG/4 ML (5 MG/ML) INTRAVENOUS SYRINGE 10 MG: at 13:56

## 2019-11-08 RX ADMIN — IPRATROPIUM BROMIDE AND ALBUTEROL SULFATE 3 ML: .5; 3 SOLUTION RESPIRATORY (INHALATION) at 07:37

## 2019-11-08 NOTE — PROGRESS NOTES
Continued Stay Note   [facility]     Patient Name: Eddie Prieto  MRN: [id number]  Today's Date: 11/8/2019    Admit Date: 10/30/2019    Discharge Plan     Row Name 11/08/19 1405       Plan    Plan  Following to assist with discharge planning, extubated. May need rehab.        Discharge Codes    No documentation.       Expected Discharge Date and Time     Expected Discharge Date Expected Discharge Time    Nov 9, 2019             Scarlett Acevedo LCSW

## 2019-11-08 NOTE — PAYOR COMM NOTE
"TO:AETNA  FROM:LA MORALES, RN PHONE [phone]  INPT NOTIFICATION AND CLINICALS  PT JUST RECEIVED INSURANCE AS OF TODAY    Andi Prieto (44 y.o. Male)     Date of Birth Social Security Number Address Home Phone MRN    1975  [address] [phone] [id number]    Voodoo Marital Status          None Single       Admission Date Admission Type Admitting Provider Attending Provider Department, Room/Bed    10/30/19 Emergency Prasanna Segal MD Karrick, Shandy Marcus, DO [facility] INTENSIVE CARE, I01/    Discharge Date Discharge Disposition Discharge Destination                       Attending Provider:  Ange Esquivel DO    Allergies:  Ultram [Tramadol Hcl]    Isolation:  None   Infection:  None   Code Status:  CPR    Ht:  170.2 cm (67.01\")   Wt:  71.9 kg (158 lb 9.6 oz)    Admission Cmt:  None   Principal Problem:  None                Active Insurance as of 10/30/2019     Primary Coverage     Payor Plan Insurance Group Employer/Plan Group    [email or web address] [place] [employer] [facility]      Payor Plan Address Payor Plan Phone Number Payor Plan Fax Number Effective Dates    [address] [address]   10/30/2019 - None Entered    [place] [address]       Subscriber Name Subscriber Birth Date Member ID       ANDI PRIETO 1975 [id number]                 Emergency Contacts      (Rel.) Home Phone Work Phone Mobile Phone    Saumya Beck (Sister) -- -- [phone]    Prieto,Eric (Brother) -- -- [phone]               History & Physical      Prasanna Segal MD at 10/30/19 2130              [facility] HOSPITALIST   HISTORY AND PHYSICAL      Name:  Andi Prieto   Age:  44 y.o.  Sex:  male  :  1975  MRN:  [id number]   Visit Number:  [id number]  Admission Date:  10/30/2019  Date Of Service:  10/30/19  Primary Care Physician:  Shivani Canales APRN    Chief Complaint: "     Unresponsiveness.    History Of Presenting Illness:      This is a 44-year-old male with history of drug abuse was brought to the emergency room by EMS after they were called by someone who was driving the patient around stating that the patient became unresponsive.  The history is obtained from the emergency room physician and the medical record.  The patient was given 2 mg of IV Narcan by EMS with some improvement in his mental status but he was agitated and confused.  When he came to the emergency room he was ashen looking, diaphoretic and cyanotic with minimal respiratory effort.  Patient was given 2 more milligram of Narcan while getting Ambu bag ventilation with minimal response.  His oxygen saturation was 36% in the emergency room and he was subsequently intubated in the emergency room.  Apparently, a lot of copious secretion came out of the endotracheal tube likely suggestive of aspirated vomitus.  He was placed on the mechanical ventilation and was started on propofol drip.      In the emergency room, initial vitals noted: Temperature 96.3, pulse 92, blood pressure 166/100 and pulse ox saturation of 36% with a good plethysmograph.  Blood work done in the emergency room including CMP and CBC was unremarkable except for a glucose of 315 and a white count of 21.8.  Lactic acid was 6.6 with procalcitonin of 8.3.  Troponin and BNP levels are within normal limits.  ABG done in the emergency room showed a pH of 7.14, PCO2 of 75 and PO2 of 166 with a bicarb of 26 after being on a ventilator with 100% FiO2.  Chest x-ray showed bilateral diffuse infiltrates.  CT of the head was negative for any acute abnormalities.  CT of the chest showed bilateral mid and lower zone pneumonia.  He was given 2 L of normal saline boluses and IV antibiotic therapy with Zosyn and vancomycin.  He is currently being admitted to the medical ICU for further evaluation and management.    Patient was recently in the emergency room on  10/19/2019 with generalized pain and skin lesions.  He was apparently discharged with chlorhexidine and clindamycin and was advised to follow-up with his primary care physician in 1 week.    Review Of Systems:     As per history of presenting illness.  I am unable to obtain complete review of systems at this time as the patient is on mechanical ventilation and sedated.     Past Medical History:    Past Medical History:   Diagnosis Date   • Lung disease      Past Surgical history:    Past Surgical History:   Procedure Laterality Date   • LEG SURGERY      broken bone   • NECK SURGERY       Social History:    Social History     Socioeconomic History   • Marital status: Single     Spouse name: Not on file   • Number of children: Not on file   • Years of education: Not on file   • Highest education level: Not on file   Tobacco Use   • Smoking status: Current Every Day Smoker     Packs/day: 0.50     Types: Cigarettes   • Smokeless tobacco: Current User   Substance and Sexual Activity   • Alcohol use: No   • Drug use: Yes     Comment: heroin   • Sexual activity: Defer     Family History:    No family history on file.    Allergies:      Ultram [tramadol hcl]    Home Medications:    Prior to Admission Medications     Prescriptions Last Dose Informant Patient Reported? Taking?    albuterol sulfate  (90 Base) MCG/ACT inhaler   Yes No    Inhale 2 puffs Every 4 (Four) Hours As Needed for Wheezing.    clindamycin (CLEOCIN) 300 MG capsule   No No    Take 1 capsule by mouth 4 (Four) Times a Day.           ED Medications:    Medications   propofol (DIPRIVAN) infusion 10 mg/mL 100 mL (10 mcg/kg/min × 71.8 kg Intravenous Rate/Dose Change 10/30/19 2111)   vancomycin 1750 mg in sodium chloride 0.9% 500 mL IVPB (not administered)   piperacillin-tazobactam (ZOSYN) 3.375 g in iso-osmotic dextrose 50 ml (premix) (not administered)   sodium chloride 0.9 % bolus 1,000 mL (not administered)   etomidate (AMIDATE) injection 20 mg (20 mg  Intravenous Given 10/30/19 1940)   rocuronium (ZEMURON) injection 100 mg (100 mg Intravenous Given 10/30/19 1945)   Midazolam HCl (PF) (VERSED) injection 2 mg (2 mg Intravenous Given 10/30/19 1951)   Naloxone HCl (NARCAN) injection 2 mg (2 mg Intravenous Given 10/30/19 1939)   sodium chloride 0.9 % bolus 1,000 mL (1,000 mL Intravenous New Bag 10/30/19 2013)     Vital Signs:    Temp:  [96.3 °F (35.7 °C)-97.2 °F (36.2 °C)] 97.2 °F (36.2 °C)  Heart Rate:  [] 101  Resp:  [16-20] 20  BP: (166-180)/(100-112) 176/106    Physical Exam:    General Appearance:  Unresponsive, sedated with propofol on mechanical ventilation.  Does seem to have intermittent twitching of all 4 extremities.   Head:  Atraumatic and normocephalic, without obvious abnormality.   Eyes:          Pupils bilaterally 4 mm and sluggishly reactive, conjunctivae and sclerae normal, no Icterus. No pallor.   Ears:  Ears appear intact with no abnormalities noted.   Throat: No oral lesions, no thrush, oral mucosa moist.  Endotracheal tube and NG tube are in place.   Neck: Supple, trachea midline, no thyromegaly, no carotid bruit.   Back:   No kyphoscoliosis present.    Lungs:   Chest shape is normal. Breath sounds heard bilaterally equally.  No wheezing.  Bilateral basal crackles heard.  No Pleural rub or bronchial breathing.   Heart:  Normal S1 and S2, no murmur, no gallop, no rub. No JVD.   Abdomen:   Normal bowel sounds, no masses, no organomegaly. Soft, non-distended.  Tinoco catheter is in place.  Midline surgical scar noted.   Extremities: Twitching of all 4 extremities noted, no edema, no cyanosis, no clubbing.  Surgical scar noted on the left ankle area.   Pulses: Pulses palpable and equal bilaterally.   Skin: No bleeding or bruising.  Multiple skin ulcers with various stages of healing and scabs noted throughout the body including torso, abdomen and all 4 extremities.  Multiple tattoos noted on the skin.   Neurologic: Sedated on the mechanical  ventilation.  Unresponsive with twitching movements noted intermittently in all 4 limbs. No facial asymmetry.     Laboratory data:    I have reviewed the labs done in the emergency room.    Results from last 7 days   Lab Units 10/30/19  1957   SODIUM mmol/L 139   POTASSIUM mmol/L 3.9   CHLORIDE mmol/L 99   CO2 mmol/L 18.8*   BUN mg/dL 18   CREATININE mg/dL 1.09   CALCIUM mg/dL 9.3   BILIRUBIN mg/dL 0.2   ALK PHOS U/L 140*   ALT (SGPT) U/L 39   AST (SGOT) U/L 50*   GLUCOSE mg/dL 315*     Results from last 7 days   Lab Units 10/30/19  1957   WBC 10*3/mm3 21.80*   HEMOGLOBIN g/dL 12.3*   HEMATOCRIT % 42.3   PLATELETS 10*3/mm3 460*         Results from last 7 days   Lab Units 10/30/19  1957   TROPONIN T ng/mL <0.010     Results from last 7 days   Lab Units 10/30/19  1957   PROBNP pg/mL 385.2             Results from last 7 days   Lab Units 10/30/19  2045   PH, ARTERIAL pH units 7.141*   PO2 ART mm Hg 166.0*   PCO2, ARTERIAL mm Hg 75.2*   HCO3 ART mmol/L 25.6     Results from last 7 days   Lab Units 10/30/19  2011   COLOR UA  Yellow   GLUCOSE UA  500 mg/dL (2+)*   KETONES UA  Negative   LEUKOCYTES UA  Negative   PH, URINE  6.0   BILIRUBIN UA  Negative   UROBILINOGEN UA  0.2 E.U./dL     Pain Management Panel     Pain Management Panel Latest Ref Rng & Units 10/30/2019    AMPHETAMINES SCREEN, URINE Negative Positive(A)    BARBITURATES SCREEN Negative Negative    BENZODIAZEPINE SCREEN, URINE Negative Negative    BUPRENORPHINEUR Negative Negative    COCAINE SCREEN, URINE Negative Negative    METHADONE SCREEN, URINE Negative Negative    METHAMPHETAMINEUR Negative Positive(A)        EKG:      EKG done in the emergency room was reviewed by me.  It shows sinus tachycardia at 126 bpm.  Normal axis.  No significant ST-T changes were noted.    Radiology:    Imaging Results (last 72 hours)     Procedure Component Value Units Date/Time    CT Chest Without Contrast [[id number]] Updated:  10/30/19 2033    CT Head Without Contrast  [[id number]] Updated:  10/30/19 2030    XR Chest 1 View [[id number]] Updated:  10/30/19 2015        Portable chest x-ray done in the emergency room was reviewed by me.  It shows endotracheal tube ending right above the gilles.  Surgical plates and screws noted in the cervical area.  Bilateral fluffy nonhomogenous opacities noted in both mid and lower zones.  An official report is currently pending.    CT of the chest without contrast done in the emergency room is reported as follows:    FINDINGS:  There is moderate upper lobe bullous emphysema. There is airspace consolidation in the left greater than right lower lobes, consistent with pneumonia. Patchy groundglass airspace opacities are seen elsewhere, consistent with pneumonia as well. There is no convincing pleural effusion. There is no pneumothorax.  IMPRESSION:  Bilateral lung opacities, worrisome for pneumonia    CT of the head without contrast done in the emergency room is reported as follows:    FINDINGS:  The ventricles are normal. There is no mass or other abnormal hypodensity. There is no shift of midline structures. There is no intracranial hemorrhage. No acute sinus or osseous abnormality is seen.  IMPRESSION:  Unremarkable.    Assessment:    1.  Acute toxic encephalopathy, present on admission.  2.  Acute hypoxic and hypercapnic respiratory failure secondary to #2, present on admission.  3.  Acute drug overdose with opiates and methamphetamine, present on admission.  4.  Sepsis secondary to #5, present on admission.  5.  Bilateral lower lobe aspiration pneumonia, present on admission.  6.  Hyperglycemia, likely secondary to diabetes, present on admission.  7.  Hypertensive urgency, present on admission.  8.  Twitching of all 4 extremities, suspect anoxic encephalopathy.  9.  Nicotine dependence.  10.  Recreational drug abuse.  11.  Multiple skin ulcers, present on admission.    Plan:    Mr. Prieto is currently being admitted to the medical ICU as an  inpatient.  We will continue him on mechanical ventilation and keep him on sedation.  At this time, we will hold off on Narcan drip as he needs to be sedated on mechanical ventilation.  He does have significant aspiration pneumonia and will be continued on IV antibiotic therapy with Zosyn and vancomycin.  Blood cultures have been obtained in the emergency room.  We will also send sputum cultures and nasal swab for MRSA.    Unfortunately, I notice intermittent twitching of all his 4 extremities.  CT of the head was negative for any acute abnormalities.  This is highly suspicious for anoxic encephalopathy especially since the patient came in cyanotic with a pulse oxygen saturation of 36%.  We will continue to maintain him on propofol and increase as necessary.    An NG tube has been placed but we will keep him n.p.o. tonight except for medications.  He will be placed on lactobacillus supplements.  He will be placed on subcutaneous insulin protocol due to hyperglycemia.  We will obtain hemoglobin A1c levels.  He will be continued on normal saline at 150 mL/h for hydration.  We will consult pulmonology in the morning.  We will consult case management.  He will be placed on Lovenox for DVT prophylaxis.  Unfortunately, his condition is critical and his prognosis seems guarded especially due to high risk for anoxic encephalopathy.  No family is currently available at the bedside.    Prasanna Segal MD  10/30/19  9:30 PM    Dictated utilizing Dragon dictation.    Electronically signed by Prasanna Segal MD at 10/30/19 2349          Emergency Department Notes      Smooth Powers DO at 10/30/19 2034      Procedure Orders    1. Intubation [[id number]] ordered by Smooth Powers DO at 10/30/19 2128     2. Critical Care [[id number]] ordered by Smooth Powers DO at 10/30/19 2128                Subjective   44-year-old male presents to the ED via EMS for chief complaint of suspected drug overdose.  EMS indicates that the patient was  "hypoxic bradypneic and minimally responsive on their arrival. They were called by someone who was driving the patient around. They called EMS after the patient \"did something\" and went unresponsive. The patient was given 2 mg IV narcan by EMS and his mental status improved and he was talking to them but agitated and confused. He decompensated en route to the ED. On arrival to the ED the patient was ashen, diaphoretic and cynanotic with minimal respiratory effort. He was moving all extremities but not following commands. Initial pulse oxygenation with good pleth was 36%. He was given 2 more mg of narcan immediately and BVM ventilation was performed. Further history and ROS is limited 2/2 to patients clinical status.             Review of Systems   Reason unable to perform ROS: altered mental status.       Past Medical History:   Diagnosis Date   • Lung disease        Allergies   Allergen Reactions   • Ultram [Tramadol Hcl] Hives       Past Surgical History:   Procedure Laterality Date   • LEG SURGERY      broken bone   • NECK SURGERY         History reviewed. No pertinent family history.    Social History     Socioeconomic History   • Marital status: Single     Spouse name: Not on file   • Number of children: Not on file   • Years of education: Not on file   • Highest education level: Not on file   Tobacco Use   • Smoking status: Current Every Day Smoker     Packs/day: 0.50     Types: Cigarettes   • Smokeless tobacco: Current User   Substance and Sexual Activity   • Alcohol use: No   • Drug use: Yes     Comment: heroin   • Sexual activity: Defer           Objective   Physical Exam   Constitutional: He appears lethargic. He appears distressed.   Cool, diaphoretic, ashen and cyanotic   HENT:   Head: Normocephalic and atraumatic.   Nose: Nose normal.   Eyes: Conjunctivae are normal. Pupils are equal, round, and reactive to light.   Pinpoint pupils bilaterally.   Cardiovascular: Normal rate, regular rhythm and intact " distal pulses.   Pulmonary/Chest:   Minimal respiratory effort.  Coarse breath sounds rhonchi bilaterally   Abdominal: Soft. He exhibits no distension. There is no tenderness.   Musculoskeletal: He exhibits no edema or deformity.   Neurological: He appears lethargic. He is disoriented. GCS eye subscore is 2. GCS verbal subscore is 2. GCS motor subscore is 6.   Lethargic.  Agitated/combative.  Incoherent mumbling speech.  Is moving all extremities  Not following commands or answering appropriately   Skin: He is diaphoretic.   Nursing note and vitals reviewed.      Critical Care  Performed by: Smooth Powers DO  Authorized by: Somoth Powers DO     Critical care provider statement:     Critical care time (minutes):  60    Critical care time was exclusive of:  Separately billable procedures and treating other patients    Critical care was necessary to treat or prevent imminent or life-threatening deterioration of the following conditions:  Cardiac failure, respiratory failure, sepsis, shock, circulatory failure and toxidrome    Critical care was time spent personally by me on the following activities:  Ordering and performing treatments and interventions, development of treatment plan with patient or surrogate, discussions with consultants, evaluation of patient's response to treatment, examination of patient, ordering and review of laboratory studies, ordering and review of radiographic studies, pulse oximetry, re-evaluation of patient's condition, review of old charts and ventilator management  Intubation  Date/Time: 10/30/2019 9:28 PM  Performed by: Smooth Powers DO  Authorized by: Smooth Powers DO     Consent:     Consent obtained:  Verbal and emergent situation    Risks discussed:  Aspiration, brain injury, death and bleeding    Alternatives discussed:  No treatment  Pre-procedure details:     Patient status:  Unresponsive    Mallampati score:  III  Procedure details:     Preoxygenation:  Bag valve  mask    CPR in progress: no      Intubation method:  Oral    Oral intubation technique:  Video-assisted    Laryngoscope blade:  Mac 4    Tube size (mm):  7.5    Tube type:  Cuffed    Number of attempts:  1    Ventilation between attempts: no      Cricoid pressure: no      Tube visualized through cords: yes    Placement assessment:     ETT to lip:  26    ETT to teeth:  25    Tube secured with:  ETT prado    Breath sounds:  Equal    Placement verification: chest rise, CXR verification, direct visualization, equal breath sounds and ETCO2 detector      CXR findings:  ETT in proper place  Post-procedure details:     Patient tolerance of procedure:  Tolerated well, no immediate complications              ED Course  ED Course as of Oct 31 0056   Wed Oct 30, 2019   2048 EKG interpreted by me.  Sinus rhythm.  Tachycardic.  Rate of 126.  No ST segment or T wave abnormalities.  Abnormal EKG  [CG]      ED Course User Index  [CG] Smooth Powers, DO                  MDM  44-year-old male presented to the ED for acute opiate overdose.  Initially got 2 mg of Narcan by EMS and had some improvement in his respiratory status but on arrival to the ED the patient was cyanotic and ashen confused and significantly hypoxic.  I did give the patient a dose of Narcan but he continued to be close to apnea but was combative and confused likely related to his hypoxia. He had coarse gurgly breath sounds suspicious for significant aspiration.  Given the no improvement after the second dose of Narcan the patient was subsequently intubated for airway protection.  Soon as the patient was successfully intubated he had significant amounts of brown likely gastric contents come back up through the ET tube.  We were able to perform in-line suction and his pulse ox improved after.  Laboratory results show significantly elevated white blood cell count and lactic acid.  This is likely related to hypoxia.  Chest x-ray and CT chest are concerning for  bilateral pneumonia I suspect that his aspiration pneumonia but we will start the patient on broad-spectrum antibiotics.  Patient was started on propofol for sedation.  CT brain negative for acute process.  I wonder if the patient will have some chronic sequelae from anoxic brain injury.  Initial ABG that was performed almost 1 hour after the patient had been intubated showed significant hypercapnia with a PCO2 greater than 75.  Unknown how long the patient was significantly hypoxic.  Family does note that he has a significant history of IV drug abuse and his drug of choice is heroin.  I suspect that the patient probably has polysubstance overdose given his UDS being positive for other illicit substances.  Patient received IV fluid rehydration which improved his heart rate and his blood pressure continued to be stable.  He was afebrile.  Discussed case with Dr. Segal, he accepts patient for admission to the ICU for further evaluation medical management.  Final diagnoses:   Acute respiratory failure with hypoxia and hypercapnia (CMS/HCC)   Elevated procalcitonin   Aspiration pneumonia, unspecified aspiration pneumonia type, unspecified laterality, unspecified part of lung (CMS/HCC)   Polysubstance overdose, undetermined intent, initial encounter   Sepsis, due to unspecified organism, unspecified whether acute organ dysfunction present (CMS/HCC)              Smooth Powers DO  10/31/19 0056      Electronically signed by Smooth Powers DO at 10/31/19 0056     Ben Andrade at 10/30/19 2129        House Supervisor called at this time for bed assignment. Cherelle stated the RN would be called in and would call us back with room number     Ben Andrade  10/30/19 2130      Electronically signed by Ben Andrade at 10/30/19 2130     Imelda Dael, RN at 10/30/19 2135        Notified RT that patient oxygen and CO2 were trending down even after increasing ventilator setting.      Imelda Deal, TREMAYNE  10/30/19  "2143      Electronically signed by Imelda Deal RN at 10/30/19 2143     Imelda Deal, RN at 10/30/19 2140        Pt's was suctioned by RT and ventilator setting increased to 100% O2. Will continue to monitor.      Imelda Deal RN  10/30/19 2143      Electronically signed by Imelda Deal RN at 10/30/19 2143     Ben Andrade at 10/30/19 2209        Patient will be going to ICU-1     Ben Andrade  10/30/19 2209      Electronically signed by Ben Andrade at 10/30/19 2209       Hospital Medications (active)       Dose Frequency Start End    acetaminophen (TYLENOL) 160 MG/5ML solution 650 mg 650 mg Every 4 Hours PRN 10/30/2019     Sig - Route: Take 20.3 mL by mouth Every 4 (Four) Hours As Needed for Mild Pain . - Oral    Linked Group 1:  \"Or\" Linked Group Details        acetaminophen (TYLENOL) suppository 650 mg 650 mg Every 4 Hours PRN 10/30/2019     Sig - Route: Insert 1 suppository into the rectum Every 4 (Four) Hours As Needed for Mild Pain . - Rectal    Linked Group 1:  \"Or\" Linked Group Details        acetaminophen (TYLENOL) tablet 650 mg 650 mg Every 4 Hours PRN 10/30/2019     Sig - Route: Take 2 tablets by mouth Every 4 (Four) Hours As Needed for Mild Pain . - Oral    Linked Group 1:  \"Or\" Linked Group Details        alteplase ((CATHFLO/ACTIVASE)) INTRACATHETER injection 2 mg 2 mg As Needed 11/7/2019     Sig - Route: 2 mL by Intracatheter route As Needed (Thrombotic Occlusion of Catheter). - Intracatheter    cefTRIAXone (ROCEPHIN) IVPB 2 g/50ml dextrose (premix) 2 g Every 24 Hours 11/7/2019 11/9/2019    Sig - Route: Infuse 50 mL into a venous catheter Daily. - Intravenous    chlorothiazide (DIURIL) injection 500 mg 500 mg Every 6 Hours 11/7/2019 11/7/2019    Sig - Route: Infuse 500 mg into a venous catheter Every 6 (Six) Hours. - Intravenous    dextrose (D50W) 25 g/ 50mL Intravenous Solution 25 g 25 g Every 15 Minutes PRN 10/30/2019     Sig - Route: Infuse 50 mL into a venous catheter " Every 15 (Fifteen) Minutes As Needed for Low Blood Sugar (Blood Sugar Less Than 70, Patient Has IV Access - Unresponsive, NPO or Unable To Safely Swallow). - Intravenous    dextrose (GLUTOSE) oral gel 1 tube 1 tube Every 15 Minutes PRN 10/30/2019     Sig - Route: Take 1 tube by mouth Every 15 (Fifteen) Minutes As Needed for Low Blood Sugar (BS<70, Patient Alert, Is not NPO, Can safely swallow.). - Oral    enoxaparin (LOVENOX) syringe 40 mg 40 mg Every 24 Hours 10/30/2019     Sig - Route: Inject 0.4 mL under the skin into the appropriate area as directed Daily. - Subcutaneous    glucagon (human recombinant) (GLUCAGEN DIAGNOSTIC) injection 1 mg 1 mg As Needed 10/30/2019     Sig - Route: Inject 1 mg under the skin into the appropriate area as directed As Needed for Low Blood Sugar (Blood Glucose Less Than 70 - Patient Without IV Access - Unresponsive, NPO or Unable To Safely Swallow). - Subcutaneous    insulin regular (humuLIN R,novoLIN R) injection 0-7 Units 0-7 Units Every 6 Hours Scheduled 10/31/2019     Sig - Route: Inject 0-7 Units under the skin into the appropriate area as directed Every 6 (Six) Hours. - Subcutaneous    ipratropium-albuterol (DUO-NEB) nebulizer solution 3 mL 3 mL Every 6 Hours - RT 11/5/2019     Sig - Route: Take 3 mL by nebulization Every 6 (Six) Hours. - Nebulization    Cosign for Ordering: Accepted by Ange Esquivel DO on 11/5/2019  1:03 PM    labetalol (NORMODYNE,TRANDATE) injection 10 mg 10 mg Every 4 Hours PRN 10/30/2019     Sig - Route: Infuse 2 mL into a venous catheter Every 4 (Four) Hours As Needed for High Blood Pressure. - Intravenous    lactobacillus acidophilus (RISAQUAD) capsule 1 capsule 1 capsule 2 Times Daily 10/30/2019     Sig - Route: Take 1 capsule by mouth 2 (Two) Times a Day. - Oral    LORazepam (ATIVAN) injection 0.5 mg 0.5 mg Every 4 Hours PRN 11/7/2019 11/10/2019    Sig - Route: Infuse 0.25 mL into a venous catheter Every 4 (Four) Hours As Needed for Anxiety or  Agitation. - Intravenous    midazolam (VERSED) 5 MG/5ML injection  - ADS Override Pull   11/7/2019 11/8/2019    Notes to Pharmacy: Created by cabinet override    Morphine sulfate (PF) injection 2 mg 2 mg Every 4 Hours PRN 11/7/2019 11/17/2019    Sig - Route: Infuse 1 mL into a venous catheter Every 4 (Four) Hours As Needed for Severe Pain . - Intravenous    multivitamin with iron-minerals liquid 15 mL 15 mL Daily 10/31/2019     Sig - Route: Take 15 mL by mouth Daily. - Oral    ondansetron (ZOFRAN) injection 4 mg 4 mg Every 6 Hours PRN 10/30/2019     Sig - Route: Infuse 2 mL into a venous catheter Every 6 (Six) Hours As Needed for Nausea or Vomiting. - Intravenous    Pharmacy to Dose Zosyn  Continuous PRN 11/4/2019 11/9/2019    Sig - Route: Continuous As Needed for Consult. - Does not apply    sodium chloride 0.9 % flush 10 mL 10 mL Every 12 Hours Scheduled 10/30/2019     Sig - Route: Infuse 10 mL into a venous catheter Every 12 (Twelve) Hours. - Intravenous    sodium chloride 0.9 % flush 10 mL 10 mL As Needed 10/30/2019     Sig - Route: Infuse 10 mL into a venous catheter As Needed for Line Care. - Intravenous    sodium chloride 0.9 % flush 10 mL 10 mL Every 12 Hours Scheduled 10/31/2019     Sig - Route: Infuse 10 mL into a venous catheter Every 12 (Twelve) Hours. - Intravenous    sodium chloride 0.9 % flush 10 mL 10 mL Every 12 Hours Scheduled 10/31/2019     Sig - Route: Infuse 10 mL into a venous catheter Every 12 (Twelve) Hours. - Intravenous    sodium chloride 0.9 % flush 10 mL 10 mL Every 12 Hours Scheduled 10/31/2019     Sig - Route: Infuse 10 mL into a venous catheter Every 12 (Twelve) Hours. - Intravenous    sodium chloride 0.9 % flush 10 mL 10 mL As Needed 10/31/2019     Sig - Route: Infuse 10 mL into a venous catheter As Needed for Line Care (After Medication Administration). - Intravenous    sodium chloride 0.9 % flush 20 mL 20 mL As Needed 10/31/2019     Sig - Route: Infuse 20 mL into a venous catheter  As Needed for Line Care (After Blood Draws or Blood Product Administration). - Intravenous    chlorhexidine (PERIDEX) 0.12 % solution 15 mL (Discontinued) 15 mL Every 12 Hours Scheduled 10/30/2019 11/7/2019    Sig - Route: Apply 15 mL to the mouth or throat Every 12 (Twelve) Hours. - Mouth/Throat    dexmedetomidine (PRECEDEX) 400 mcg/100 mL (4 mcg/mL) infusion (Discontinued) 0.2-1.5 mcg/kg/hr × 76.4 kg Titrated 11/4/2019 11/7/2019    Sig - Route: Infuse 15..6 mcg/hr into a venous catheter Dose Adjusted By Provider As Needed. - Intravenous    famotidine (PEPCID) injection 20 mg (Discontinued) 20 mg Every 12 Hours Scheduled 10/30/2019 11/7/2019    Sig - Route: Infuse 2 mL into a venous catheter Every 12 (Twelve) Hours. - Intravenous    fentaNYL (SUBLIMAZE) infusion 10 mcg/ml (Discontinued)  mcg/hr Continuous 11/4/2019 11/7/2019    Sig - Route: Infuse  mcg/hr into a venous catheter Continuous. - Intravenous    midazolam (VERSED) injection 2 mg (Discontinued) 2 mg Once As Needed 11/7/2019 11/7/2019    Sig - Route: Infuse 2 mL into a venous catheter 1 (One) Time As Needed (x 2 doses). - Intravenous    Midazolam HCl (PF) (VERSED) injection 2 mg (Discontinued) 2 mg Once 11/7/2019 11/7/2019    Sig - Route: Infuse 2 mL into a venous catheter 1 (One) Time. - Intravenous    Morphine sulfate (PF) injection 4 mg (Discontinued) 4 mg Every 2 Hours PRN 10/30/2019 11/7/2019    Sig - Route: Infuse 1 mL into a venous catheter Every 2 (Two) Hours As Needed for Severe Pain  (Agitation). - Intravenous    Reason for Discontinue: Duplicate order    piperacillin-tazobactam (ZOSYN) 4.5 g in iso-osmotic dextrose 100 mL IVPB (premix) (Discontinued) 4.5 g Every 8 Hours 11/5/2019 11/7/2019    Sig - Route: Infuse 100 mL into a venous catheter Every 8 (Eight) Hours. - Intravenous    propofol (DIPRIVAN) infusion 10 mg/mL 100 mL (Discontinued) 5-50 mcg/kg/min × 71.8 kg Titrated 10/30/2019 11/7/2019    Sig - Route: Infuse 359-6,396  mcg/min into a venous catheter Dose Adjusted By Provider As Needed. - Intravenous             Physician Progress Notes (last 7 days) (Notes from 19 through 19)      Ange Esquivel DO at 19 1216                [facility]    PROGRESS NOTE    Name:  Eddie Prieto   Age:  44 y.o.  Sex:  male  :  1975  MRN:  [id number]   Visit Number:  [id number]  Admission Date:  10/30/2019  Date Of Service:  19  Primary Care Physician:  Shivani Canales APRN     LOS: 8 days :  Patient Care Team:  Shivani Canales APRN as PCP - General (Family Medicine):    Chief Complaint:      Follow-up on respiratory failure, septic shock, drug overdose    Subjective / Interval History:     Patient remains intubated and sedated in the ICU.  Nursing staff, still having intermittent fevers.  Social work has talked with family members this morning as well.    Prior work-up:  44-year-old male with history of drug abuse was brought to the emergency room by EMS after they were called by someone who was driving the patient around stating that the patient became unresponsive.  The history is obtained from the emergency room physician and the medical record.  The patient was given 2 mg of IV Narcan by EMS with some improvement in his mental status but he was agitated and confused.  When he came to the emergency room he was ashen looking, diaphoretic and cyanotic with minimal respiratory effort.  Patient was given 2 more milligram of Narcan while getting Ambu bag ventilation with minimal response.  His oxygen saturation was 36% in the emergency room and he was subsequently intubated in the emergency room.  Patient required pressors on day of admission due to hypotension.  He was significantly tachycardic and was started on phenylephrine.  This was able to be titrated down over the following day his sinus tachycardia resolved and blood pressure stabilized.  Patient started  on broad-spectrum antibiotic's with vancomycin and Zosyn.  Cultures obtained.    Patient has been on mechanical ventilation since 10/30/2019.  He has had multiple attempts to extubate, with failure.  He is underwent bronchoscopy on 11/6/2019 with removal of significant mucus/secretions.  He is having intermittent fevers.  Has been on vancomycin and Zosyn, on Zosyn alone since yesterday.    Review of Systems:     Complete review of systems unobtainable secondary to patient's intubated and sedated status    Vital Signs:    Temp:  [99.6 °F (37.6 °C)-101.3 °F (38.5 °C)] 99.6 °F (37.6 °C)  Heart Rate:  [57-90] 90  Resp:  [15-30] 30  BP: ()/(51-75) 128/72  FiO2 (%):  [55 %] 55 %    Intake and output:    I/O last 3 completed shifts:  In: 5162 [I.V.:3460; Other:661; NG/GT:1041]  Out: 4470 [Urine:4470]  No intake/output data recorded.    Physical Examination:    General Appearance:   Sedated, intubated   Head:  Atraumatic and normocephalic, without obvious abnormality.   Eyes:          PERRLA, conjunctivae and sclerae normal, no Icterus. No pallor.    Neck: Supple, trachea midline, no thyromegaly.  Left IJ CVP line.   Lungs:   Breath sounds heard bilaterally equally.  Bilateral crackles unchanged.   Heart:  Normal S1 and S2, no murmur, no gallop, no rub. No JVD   Abdomen:   Normal bowel sounds, no masses, no organomegaly. Soft, non-tender, non-distended, no guarding, no rebound tenderness.   Extremities: Moves all extremities well, trace edema, no cyanosis, no clubbing.   Skin: No bleeding, bruising or rash.   Neurologic:  Sedated and no clonus.     Laboratory results:    Results from last 7 days   Lab Units 11/07/19  0404 11/06/19  0407 11/05/19  0439  11/02/19  0421   SODIUM mmol/L 143 146* 147*   < > 142   POTASSIUM mmol/L 3.5 3.8 3.9   < > 3.9   CHLORIDE mmol/L 103 104 109*   < > 114*   CO2 mmol/L 30.7* 32.1* 28.1   < > 22.6   BUN mg/dL 31* 31* 25*   < > 27*   CREATININE mg/dL 0.85 0.95 0.75*   < > 0.83   CALCIUM  mg/dL 8.5* 8.3* 8.2*   < > 8.2*   BILIRUBIN mg/dL 0.7  --   --   --  0.3   ALK PHOS U/L 253*  --   --   --  135*   ALT (SGPT) U/L 17  --   --   --  17   AST (SGOT) U/L 38  --   --   --  21   GLUCOSE mg/dL 128* 129* 109*   < > 128*    < > = values in this interval not displayed.     Results from last 7 days   Lab Units 11/07/19  0404 11/06/19  0407 11/05/19  0439   WBC 10*3/mm3 10.33 8.91 9.84   HEMOGLOBIN g/dL 8.3* 8.1* 8.6*   HEMATOCRIT % 27.4* 26.8* 27.4*   PLATELETS 10*3/mm3 240 219 231             Results from last 7 days   Lab Units 11/04/19  1135 11/04/19  1130   BLOODCX  No growth at 3 days No growth at 3 days       I have reviewed the patient's laboratory results.    Radiology results:    Imaging Results (Last 24 Hours)     Procedure Component Value Units Date/Time    XR Chest 1 View [[id number]] Collected:  11/07/19 0927     Updated:  11/07/19 0932    Narrative:       PORTABLE CHEST     INDICATION: Respiratory failure. Follow up infiltrates.     FINDINGS: Single frontal portable chest, compared with 11/06/2019. EKG  leads overlie the chest. Support tubes and lines remain in place. No  pneumothorax. Bilateral parenchymal infiltrates are again identified.  These appear similar to subtly improved. No other interval change.       Impression:       Bilateral infiltrates are similar to subtly improved.  Continued follow-up recommended.     This report was finalized on 11/7/2019 9:30 AM by Alejandro Stewart MD.          I have reviewed the patient's radiology reports.    Medication Review:     I have reviewed the patients active and prn medications.       Acute respiratory failure with hypoxia and hypercapnia (CMS/HCC)    Accidental drug overdose    Toxic encephalopathy    Sepsis (CMS/HCC)    Aspiration pneumonia of both lower lobes due to vomit (CMS/HCC)    Tobacco dependence    Drug abuse (CMS/HCC)    Hyperglycemia      Assessment:    Acute toxic encephalopathy, present on admission.  Acute hypoxic and hypercapnic  respiratory failure secondary to #2, present on admission.  Acute drug overdose with opiates and methamphetamine, present on admission.  Septic Shock secondary to pna, present on admission.  Bilateral lower lobe aspiration pneumonia, present on admission.  Acute kidney injury likely secondary to ATN from hypotension due to septic shock-resolved  Hyperglycemia, likely secondary to diabetes, present on admission. - resolved  Hypertensive urgency, present on admission - resolved  Nicotine dependence.  Recreational drug abuse.  Multiple skin ulcers, present on admission.    Plan:    Patient on day 8 of mechanical ventilation.  Status post bronchoscopy yesterday with removal of significant secretions.  T-max 101.3 since yesterday.  Blood pressure 128/72.  FiO2 55%.  CBC white blood cell count of 10,000.  Creatinine 0.85.  Alk phos elevated 253.  Repeat blood cultures this morning pending.  1 view chest x-ray with with likely unchanged bilateral infiltrates per my interpretation.    Sputum obtained on 11/3/2019 with scant Acinetobacter baumannii, citrobacter koseri.  Sensitive to Zosyn.  MRSA swab negative.    Neurology following and appreciate recommendations and management.  Will attempt to decrease sedation today and monitor response.  Will continue Diuril at 500 every 6.  Lasix drip.  Has been on Precedex and fentanyl addition of propofol.  He is going to switch antibiotics to Rocephin today.  Tube feeds on hold for extubation trial.    I am going to add an abdominal ultrasound today due to recurrent fevers in setting of critical illness patient.    Patient continues to have significant multiorgan failure.  Case management discussing with patients family in regards to potential need for tracheostomy and long-term care.  We will review this further.  Anticipate prolonged hospitalization at this point.    Ange Esquivel,   11/07/19  12:16 PM    Dictated utilizing Dragon dictation.    Electronically signed by  "Ange Esquivel Damian, DO at 19 1226     Bria Tamayo MD at 19 0820            CC: Acute Respiratory Failure.     S: Intubated and sedated.  S/P Bronchoscopy.     ROS: Could not be obtained as the patient is on mechanical ventilator.    O:Vital signs reviewed.  FiO2: 55%. CVP Line. Day # 8. Vent Day # 8  /72   Pulse 89   Temp (!) 101.1 °F (38.4 °C) (Oral)   Resp (!) 30   Ht 170.2 cm (67.01\")   Wt 75 kg (165 lb 4.8 oz)   SpO2 90%   BMI 25.88 kg/m²      Temp (24hrs), Av.6 °F (38.1 °C), Min:99.8 °F (37.7 °C), Max:101.3 °F (38.5 °C)      I & Os reviewed.   Intake/Output       19 0700 - 19 0659    Intake (ml) 2900    Output (ml) 2280    Net (ml) 620    Last Weight  75 kg (165 lb 4.8 oz)          General/Constitutional: Intubated. Sedated.  Eyes: PERRL. 2-3 mm.   Neck: Supple without JVD. No obvious masses noted. Left IJ CVP line in place.   Cardiovascular: S1 + S2. Regular.    Lungs/Respiratory: Transmitted Breath sounds noted. No wheezing heard. Right > Left crackles noted.  GI/Abdomen: Soft. Bowel sounds positive. No organomegaly noted.  Musculoskeletal/Extremities: Trace edema noted. Gait could not be assessed at this time, as the patient was laying in bed.   Neurologic: Sedated. Detailed exam couldn't be performed due to sedation.    Psych: Could not be performed as he is currently sedated.   Skin: Appeared with diffuse scattered ulcerations over upper and lower extremities.     Labs: Reviewed.   Results from last 7 days   Lab Units 19  0404 19  0407 19  0439  19  0421   SODIUM mmol/L 143 146* 147*   < > 142   POTASSIUM mmol/L 3.5 3.8 3.9   < > 3.9   CHLORIDE mmol/L 103 104 109*   < > 114*   CO2 mmol/L 30.7* 32.1* 28.1   < > 22.6   BUN mg/dL 31* 31* 25*   < > 27*   CREATININE mg/dL 0.85 0.95 0.75*   < > 0.83   CALCIUM mg/dL 8.5* 8.3* 8.2*   < > 8.2*   BILIRUBIN mg/dL 0.7  --   --   --  0.3   ALK PHOS U/L 253*  --   --   --  135*   ALT (SGPT) U/L " 17  --   --   --  17   AST (SGOT) U/L 38  --   --   --  21   GLUCOSE mg/dL 128* 129* 109*   < > 128*    < > = values in this interval not displayed.             Results from last 7 days   Lab Units 11/07/19  0404 11/06/19  0407 11/05/19  0439 11/04/19  0404 11/03/19  0359   WBC 10*3/mm3 10.33 8.91 9.84 11.00* 14.62*   NEUTROPHIL % % 69.7 57.7 64.0 73.0 82.7*   HEMOGLOBIN g/dL 8.3* 8.1* 8.6* 9.1* 9.6*   HEMATOCRIT % 27.4* 26.8* 27.4* 29.7* 31.2*   PLATELETS 10*3/mm3 240 219 231 241 273             Lab Results   Component Value Date    PROCALCITO 2.72 (H) 11/05/2019    PROCALCITO 7.46 (H) 11/03/2019    PROCALCITO 8.30 (H) 10/30/2019       Lab Results   Component Value Date    PROBNP 385.2 10/30/2019         dexmedetomidine 0.2-1.5 mcg/kg/hr Last Rate: 1.414 mcg/kg/hr (11/07/19 0547)   fentaNYL (SUBLIMAZE) infusion 10 mcg/ml  mcg/hr Last Rate: 300 mcg/hr (11/07/19 0754)   Pharmacy to Dose Zosyn     propofol 5-50 mcg/kg/min Last Rate: 50 mcg/kg/min (11/07/19 0548)         Micro: As of November 7, 2019   Lab Results   Component Value Date    RESPCX Scant growth (1+) Acinetobacter baumannii (A) 11/03/2019    RESPCX Light growth (2+) Candida albicans (A) 11/03/2019    RESPCX Scant growth (1+) Citrobacter koseri (A) 11/03/2019    RESPCX No Normal Respiratory Dunia (A) 11/03/2019     Lab Results   Component Value Date    BLOODCX No growth at 2 days 11/04/2019    BLOODCX No growth at 2 days 11/04/2019       Lab Results   Component Value Date    MRSACX  10/31/2019     No Methicillin Resistant Staphylococcus aureus isolated       ABG: Reviewed.  Lab Results   Component Value Date    PHART 7.420 11/06/2019    SUE4TTN 54.4 (H) 11/06/2019    PO2ART 73.5 (L) 11/06/2019    HGBBG 8.6 (L) 11/06/2019    Q8TTTGUP 95.2 11/06/2019    CARBOXYHGB 1.0 11/06/2019       CXRay: Latest imaging study was reviewed personally.   Imaging Results (Last 24 Hours)     Procedure Component Value Units Date/Time    XR Chest 1 View [[id number]]  Collected:  11/07/19 0927     Updated:  11/07/19 0932    Narrative:       PORTABLE CHEST     INDICATION: Respiratory failure. Follow up infiltrates.     FINDINGS: Single frontal portable chest, compared with 11/06/2019. EKG  leads overlie the chest. Support tubes and lines remain in place. No  pneumothorax. Bilateral parenchymal infiltrates are again identified.  These appear similar to subtly improved. No other interval change.       Impression:       Bilateral infiltrates are similar to subtly improved.  Continued follow-up recommended.     This report was finalized on 11/7/2019 9:30 AM by Alejandro Stewart MD.          Assessment & Recommendations/Plan:   1.  Acute Respiratory Failure  On mechanical ventilation.   On Precedex, Fentanyl and Propofol.   I have asked the nursing staff to wean Precedex off.   Bronchoscopy was performed yesterday.  Showed significant secretions.  Chest x-ray will be repeated in the morning.  Will administer Diuril again today.  If the patient is able to follow some commands, we will consider spontaneous breathing trial once again today.    2.  Left sided ?aspiration? Pneumonia.   Procalcitonin level decreasing.    Will repeat procalcitonin level in 48 hours or so.  Sputum culture showing Acinetobacter and Citrobacter.  Cultures were sent from bronchoscopy yesterday.  Vancomycin discontinued.  We will switch antibiotics to ceftriaxone, for another 2 days given significantly abnormal chest x-ray, worsening FiO2 and continued secretions.    3.  Shock and respiratory acidosis  Resolved    4.  Drug overdose  Toxicology screen was positive for multiple agents.    5.  Anemia  Likely dilutional.   Will start Lasix IV drip.     6.  Nutrition  Has been able to tolerate the tube feeds.  Currently on hold for possible extubation trial today.    7.   Fever.  Continue antibiotics.  Blood Cultures sent on 11/6/19.     Will ask for nursing staff to try condom catheter to allow removal of Tinoco catheter,  since it has been in place for 7 days or more.    The patient has failed extubation on multiple days and continues to have high FiO2 requirements.  If the patient continues to fail extubation then tracheostomy may need to be considered.  Apparently this has been discussed by the family members and they are not entirely sure if they want to proceed with tracheostomy.    Critical Care time spent in direct patient care: 40 minutes including high complexity decision making to assess, manipulate, and support vital organ system failure in this individual who has impairment of one or more vital organ systems such that there is a high probability of imminent or life threatening deterioration in the patient’s condition. This time excludes other billable procedures. Time does include preparation of documents, review of old records, and direct bedside care.    We have reviewed patient's current orders and changes, if any, have been suggested to primary care team. Plan was also discussed with nursing staff, as necessary.     This document was electronically signed by Bria Tamayo MD on 19 at 8:52 AM      Dictated utilizing Dragon dictation.        Electronically signed by Bria Tamayo MD at 19 1217     Ange Esquivel DO at 19 1238                [facility]    PROGRESS NOTE    Name:  Eddie Prieto   Age:  44 y.o.  Sex:  male  :  1975  MRN:  [id number]   Visit Number:  [id number]  Admission Date:  10/30/2019  Date Of Service:  19  Primary Care Physician:  Shivani Canales APRN     LOS: 7 days :  Patient Care Team:  Shivani Canales APRN as PCP - General (Family Medicine):    Chief Complaint:      Follow-up on respiratory failure, septic shock, drug overdose    Subjective / Interval History:     Patient remains intubated and sedated.  Failed weaning trial yesterday.  Still having fevers overnight.  Having a lot of secretions per nursing.   Becomes very agitated when sedation is weaned.    Prior work-up:  44-year-old male with history of drug abuse was brought to the emergency room by EMS after they were called by someone who was driving the patient around stating that the patient became unresponsive.  The history is obtained from the emergency room physician and the medical record.  The patient was given 2 mg of IV Narcan by EMS with some improvement in his mental status but he was agitated and confused.  When he came to the emergency room he was ashen looking, diaphoretic and cyanotic with minimal respiratory effort.  Patient was given 2 more milligram of Narcan while getting Ambu bag ventilation with minimal response.  His oxygen saturation was 36% in the emergency room and he was subsequently intubated in the emergency room.  Patient required pressors on day of admission due to hypotension.  He was significantly tachycardic and was started on phenylephrine.  This was able to be titrated down over the following day his sinus tachycardia resolved and blood pressure stabilized.  Patient started on broad-spectrum antibiotic's with vancomycin and Zosyn.  Cultures obtained.  Review of Systems:     Complete review of systems unobtainable secondary to patient's intubated and sedated status.    Vital Signs:    Temp:  [98.8 °F (37.1 °C)-103 °F (39.4 °C)] 100.8 °F (38.2 °C)  Heart Rate:  [58-86] 65  Resp:  [18-28] 20  BP: ()/(47-89) 103/60  FiO2 (%):  [50 %-55 %] 55 %    Intake and output:    I/O last 3 completed shifts:  In: 5517 [I.V.:2804; Other:1381; NG/GT:782; IV Piggyback:550]  Out: 5390 [Urine:5390]  I/O this shift:  In: -   Out: 210 [Urine:210]    Physical Examination:    General Appearance:   Sedated, intubated   Head:  Atraumatic and normocephalic, without obvious abnormality.   Eyes:          PERRLA, conjunctivae and sclerae normal, no Icterus. No pallor. Extraocular movements are within normal limits.   Neck: Supple, trachea midline, no  thyromegaly.   Lungs:   Breath sounds heard bilaterally equally.  Bilateral crackles.   Heart:  Normal S1 and S2, no murmur, no gallop, no rub. No JVD   Abdomen:   Normal bowel sounds, no masses, no organomegaly. Soft, non-tender, non-distended, no guarding, no rebound tenderness.   Extremities: Moves all extremities well, trace lower edema, no cyanosis, no clubbing.   Skin: No bleeding, bruising or rash.  Multiple ulcerations of her lower extremities.   Neurologic:  Sedated.     Laboratory results:    Results from last 7 days   Lab Units 11/06/19 0407 11/05/19 0439 11/04/19 0404 11/02/19  0421  10/31/19  0500 10/30/19  1957   SODIUM mmol/L 146* 147* 148*   < > 142   < > 141 139   POTASSIUM mmol/L 3.8 3.9 3.6   < > 3.9   < > 3.6 3.9   CHLORIDE mmol/L 104 109* 110*   < > 114*   < > 109* 99   CO2 mmol/L 32.1* 28.1 29.0   < > 22.6   < > 23.3 18.8*   BUN mg/dL 31* 25* 19   < > 27*   < > 19 18   CREATININE mg/dL 0.95 0.75* 0.73*   < > 0.83   < > 0.98 1.09   CALCIUM mg/dL 8.3* 8.2* 8.4*   < > 8.2*   < > 8.0* 9.3   BILIRUBIN mg/dL  --   --   --   --  0.3  --  0.4 0.2   ALK PHOS U/L  --   --   --   --  135*  --  100 140*   ALT (SGPT) U/L  --   --   --   --  17  --  32 39   AST (SGOT) U/L  --   --   --   --  21  --  38 50*   GLUCOSE mg/dL 129* 109* 104*   < > 128*   < > 117* 315*    < > = values in this interval not displayed.     Results from last 7 days   Lab Units 11/06/19 0407 11/05/19 0439 11/04/19 0404   WBC 10*3/mm3 8.91 9.84 11.00*   HEMOGLOBIN g/dL 8.1* 8.6* 9.1*   HEMATOCRIT % 26.8* 27.4* 29.7*   PLATELETS 10*3/mm3 219 231 241         Results from last 7 days   Lab Units 10/31/19  0500 10/30/19  1957   TROPONIN T ng/mL <0.010 <0.010     Results from last 7 days   Lab Units 11/04/19  1135 11/04/19  1130 10/31/19  1027 10/30/19  2137 10/30/19  2127   BLOODCX  No growth at 2 days No growth at 2 days  --  No growth at 5 days No growth at 5 days   MRSA SCREEN CX   --   --  No Methicillin Resistant Staphylococcus  aureus isolated  --   --        I have reviewed the patient's laboratory results.    Radiology results:    Imaging Results (Last 24 Hours)     Procedure Component Value Units Date/Time    XR Chest 1 View [[id number]] Collected:  11/06/19 0959     Updated:  11/06/19 1009    Narrative:       PORTABLE CHEST     INDICATION: Respiratory failure.     FINDINGS: Single frontal portable chest, compared with 11/05/2019. EKG  leads overlie the chest. Endotracheal tube, left internal jugular venous  catheter and nasogastric tube remain in place. No pneumothorax.  Multifocal parenchymal infiltrates in both lungs are again identified.  Allowing for differences in technique and positioning, these are  probably similar to previous. Slight worsening on the left not excluded,  although this is possibly due to differences in positioning.       Impression:       No definite change. Continued follow-up recommended.     This report was finalized on 11/6/2019 10:07 AM by Alejandro Stewart MD.          I have reviewed the patient's radiology reports.    Medication Review:     I have reviewed the patients active and prn medications.       Acute respiratory failure with hypoxia and hypercapnia (CMS/HCC)    Accidental drug overdose    Toxic encephalopathy    Sepsis (CMS/HCC)    Aspiration pneumonia of both lower lobes due to vomit (CMS/HCC)    Tobacco dependence    Drug abuse (CMS/HCC)    Hyperglycemia      Assessment:    Acute toxic encephalopathy, present on admission.  Acute hypoxic and hypercapnic respiratory failure secondary to #2, present on admission.  Acute drug overdose with opiates and methamphetamine, present on admission.  Septic Shock secondary to pna, present on admission.  Bilateral lower lobe aspiration pneumonia, present on admission.  Acute kidney injury likely secondary to ATN from hypotension due to septic shock-resolved  Hyperglycemia, likely secondary to diabetes, present on admission. - resolved  Hypertensive urgency, present  "on admission - resolved  Nicotine dependence.  Recreational drug abuse.  Multiple skin ulcers, present on admission.    Plan:    Patient on day 7 of mechanical ventilation.  Continues to fail weaning trial secondary to tachypnea and agitation.  Is on Precedex currently.  Still with intermittent fevers overnight.  CBC white blood cell count 8900.  Hemoglobin 8.  BMP with creatinine 0.95.  Sodium 146.  ABG with pH 7.42.  PCO2 54.  PO2 73.  Chest x-ray without definitive change.  Still with multifocal infiltrates in both lungs.  Is on Vanco and Zosyn.    Per pulmonology, with increasing FiO2 requirements and significant secretions, likely to have bronchoscopy.  Would consider extubation thereafter.  Would recommend Lasix drip and this is been started.  Procalcitonin level continues to decrease.  We will stop vancomycin and continue Zosyn and/or switch to Rocephin.  Continue antibiotics for another 3 days.  Has tube feeds in place.  If patient continues to fail extubation then tracheostomy will need to be considered.    Patient continues to meet inpatient level of care and will remain in the ICU.  Anticipate multiple day stay remaining.    Ange Esquivel DO  19  12:38 PM    Dictated utilizing Dragon dictation.    Electronically signed by Ange Esquivel DO at 19 [phone]     Bria Tamayo MD at 19 [phone]            CC: Acute Respiratory Failure.     S: Intubated and sedated.  Failed SBT yesterday again due to significant agitation, tachycardia and tachypnea.  He was placed on Lasix drip with good response.    ROS: Could not be obtained as the patient is on mechanical ventilator.    O:Vital signs reviewed.  FiO2: 55%. CVP Line. Day # 7. Vent Day # 7.  BP 95/50   Pulse 62   Temp 99.5 °F (37.5 °C) (Oral)   Resp 20   Ht 170.2 cm (67.01\")   Wt 76 kg (167 lb 9.6 oz)   SpO2 96%   BMI 26.24 kg/m²      Temp (24hrs), Av.9 °F (37.7 °C), Min:98.8 °F (37.1 °C), Max:103 °F (39.4 °C)      I & Os " reviewed.   Intake/Output       11/05/19 0700 - 11/06/19 0659    Intake (ml) 3946    Output (ml) 4665    Net (ml) -719    Last Weight  76 kg (167 lb 9.6 oz)          General/Constitutional: Intubated. Sedated.  Eyes: PERRL. 2-3 mm.   Neck: Supple without JVD. No obvious masses noted. Left IJ CVP line in place.   Cardiovascular: S1 + S2. Regular.    Lungs/Respiratory: Transmitted Breath sounds noted. No wheezing heard. Right > Left crackles noted.  GI/Abdomen: Soft. Bowel sounds positive. No organomegaly noted.  Musculoskeletal/Extremities: Trace edema noted. Gait could not be assessed at this time, as the patient was laying in bed.   Neurologic: Sedated. Detailed exam couldn't be performed due to sedation.    Psych: Could not be performed as he is currently sedated.   Skin: Appeared with diffuse scattered ulcerations over upper and lower extremities.     Labs: Reviewed.   Results from last 7 days   Lab Units 11/06/19  0407 11/05/19  0439 11/04/19  0404  11/02/19  0421  10/31/19  0500 10/30/19  1957   SODIUM mmol/L 146* 147* 148*   < > 142   < > 141 139   POTASSIUM mmol/L 3.8 3.9 3.6   < > 3.9   < > 3.6 3.9   CHLORIDE mmol/L 104 109* 110*   < > 114*   < > 109* 99   CO2 mmol/L 32.1* 28.1 29.0   < > 22.6   < > 23.3 18.8*   BUN mg/dL 31* 25* 19   < > 27*   < > 19 18   CREATININE mg/dL 0.95 0.75* 0.73*   < > 0.83   < > 0.98 1.09   CALCIUM mg/dL 8.3* 8.2* 8.4*   < > 8.2*   < > 8.0* 9.3   BILIRUBIN mg/dL  --   --   --   --  0.3  --  0.4 0.2   ALK PHOS U/L  --   --   --   --  135*  --  100 140*   ALT (SGPT) U/L  --   --   --   --  17  --  32 39   AST (SGOT) U/L  --   --   --   --  21  --  38 50*   GLUCOSE mg/dL 129* 109* 104*   < > 128*   < > 117* 315*    < > = values in this interval not displayed.       Results from last 7 days   Lab Units 10/31/19  0500   MAGNESIUM mg/dL 1.7   PHOSPHORUS mg/dL 2.2*       Results from last 7 days   Lab Units 11/06/19  0407 11/05/19  0439 11/04/19  0404 11/03/19  0359 11/02/19  0421   WBC  10*3/mm3 8.91 9.84 11.00* 14.62* 10.38   NEUTROPHIL % % 57.7 64.0 73.0 82.7*  --    HEMOGLOBIN g/dL 8.1* 8.6* 9.1* 9.6* 9.1*   HEMATOCRIT % 26.8* 27.4* 29.7* 31.2* 30.6*   PLATELETS 10*3/mm3 219 231 241 273 235             Lab Results   Component Value Date    PROCALCITO 2.72 (H) 11/05/2019    PROCALCITO 7.46 (H) 11/03/2019    PROCALCITO 8.30 (H) 10/30/2019       Lab Results   Component Value Date    PROBNP 385.2 10/30/2019         dexmedetomidine 0.2-1.5 mcg/kg/hr Last Rate: 1.414 mcg/kg/hr (11/06/19 0559)   fentaNYL (SUBLIMAZE) infusion 10 mcg/ml  mcg/hr Last Rate: 300 mcg/hr (11/06/19 0559)   Pharmacy to dose vancomycin     Pharmacy to Dose Zosyn     propofol 5-50 mcg/kg/min Last Rate: 50 mcg/kg/min (11/06/19 0552)         Micro: As of November 6, 2019   Lab Results   Component Value Date    RESPCX Scant growth (1+) Acinetobacter baumannii (A) 11/03/2019    RESPCX Light growth (2+) Candida albicans (A) 11/03/2019     Lab Results   Component Value Date    BLOODCX No growth at 24 hours 11/04/2019    BLOODCX No growth at 24 hours 11/04/2019       Lab Results   Component Value Date    MRSACX  10/31/2019     No Methicillin Resistant Staphylococcus aureus isolated       ABG: Reviewed.  Lab Results   Component Value Date    PHART 7.420 11/06/2019    MSP0CDA 54.4 (H) 11/06/2019    PO2ART 73.5 (L) 11/06/2019    HGBBG 8.6 (L) 11/06/2019    O7LRAEMD 95.2 11/06/2019    CARBOXYHGB 1.0 11/06/2019       CXRay: Latest imaging study was reviewed personally.   Imaging Results (Last 24 Hours)     Procedure Component Value Units Date/Time    XR Chest 1 View [[id number]] Updated:  11/06/19 0347    XR Chest 1 View [[id number]] Collected:  11/05/19 0957     Updated:  11/05/19 1001    Narrative:       PORTABLE CHEST     INDICATION: Respiratory failure.     FINDINGS: Single frontal portable chest, compared with 11/04/2019.  Patient is rotated to the right. EKG leads about the chest. Endotracheal  tube, nasogastric tube and left  internal jugular venous catheter remain  in place. No pneumothorax. Prominent multifocal airspace parenchymal  infiltrates appear relatively similar to previous. No other interval  change.       Impression:       Persistence of bilateral infiltrates which may represent  pneumonia. Asymmetric edema or developing ARDS not entirely excluded.  Continued follow-up recommended.     This report was finalized on 11/5/2019 9:59 AM by Alejandro Stewart MD.            Assessment & Recommendations/Plan:   1.  Acute Respiratory Failure  On mechanical ventilation.  His FiO2 requirements have increased over the past 2 to 3 days.  On Precedex, Fentanyl and Propofol.   Will consider bronch since he continues to have significant secretions, with abnormal chest x-ray and increasing FiO2 requirements.  We will consider extubation after bronchoscopy is performed.  Chest x-ray will be repeated in the morning.  Will administer Diuril today.    2.  Left sided ?aspiration? Pneumonia.   Procalcitonin level decreasing.    Will repeat procalcitonin level in 48 hours or so.  Sputum culture showing Acinetobacter, which is pansensitive.    We will discontinue vancomycin.  We will continue Zosyn for now although this can be switched to ceftriaxone.  I will however, continue antibiotics for another 3 days given significantly abnormal chest x-ray, worsening FiO2 and continued secretions.    3.  Shock and respiratory acidosis  Resolved    4.  Drug overdose  Toxicology screen was positive for multiple agents.    5.  Anemia  Likely dilutional.     6.  Nutrition  Has been able to tolerate the tube feeds.  Will hold for now till we can assess his readiness for extubation.     7.   Fever.  Continue Abx.     The patient has failed extubation on multiple days and continues to have high FiO2 requirements.  If the patient continues to fail extubation then tracheostomy may need to be considered.    Critical Care time spent in direct patient care: 40 minutes  including high complexity decision making to assess, manipulate, and support vital organ system failure in this individual who has impairment of one or more vital organ systems such that there is a high probability of imminent or life threatening deterioration in the patient’s condition. This time excludes other billable procedures. Time does include preparation of documents, review of old records, and direct bedside care.    We have reviewed patient's current orders and changes, if any, have been suggested to primary care team. Plan was also discussed with nursing staff, as necessary.     This document was electronically signed by Bria Tamayo MD on 19 at 9:27 AM      Dictated utilizing Dragon dictation.        Electronically signed by Bria Tamayo MD at 19 [phone]     Ange Esquivel DO at 19 [phone]                [facility]    [facility]ESS NOTE    Name:  Eddie Prieto   Age:  44 y.o.  Sex:  male  :  1975  MRN:  [id number]   Visit Number:  [id number]  Admission Date:  10/30/2019  Date Of Service:  19  Primary Care Physician:  Shivani Canales APRN     LOS: 6 days :  Patient Care Team:  Shivani Canales APRN as PCP - General (Family Medicine):    Chief Complaint:      Follow-up on respiratory failure, septic shock, drug overdose    Subjective / Interval History:     Patient remains intubated.  He is on mild sedation, being weaned from propofol.  He is somewhat responsive.  Attempt to wean today per pulmonology.  Still febrile overnight per nursing.    Prior work-up:  44-year-old male with history of drug abuse was brought to the emergency room by EMS after they were called by someone who was driving the patient around stating that the patient became unresponsive.  The history is obtained from the emergency room physician and the medical record.  The patient was given 2 mg of IV Narcan by EMS with some improvement in his mental status  but he was agitated and confused.  When he came to the emergency room he was ashen looking, diaphoretic and cyanotic with minimal respiratory effort.  Patient was given 2 more milligram of Narcan while getting Ambu bag ventilation with minimal response.  His oxygen saturation was 36% in the emergency room and he was subsequently intubated in the emergency room.  Patient required pressors on day of admission due to hypotension.  He was significantly tachycardic and was started on phenylephrine.  This was able to be titrated down over the following day his sinus tachycardia resolved and blood pressure stabilized.  Patient started on broad-spectrum antibiotic's with vancomycin and Zosyn.  Cultures obtained.    Review of Systems:   Complete review of systems unobtainable secondary to patient's intubated and mildly sedated status.    Vital Signs:    Temp:  [99.5 °F (37.5 °C)-101.8 °F (38.8 °C)] 99.8 °F (37.7 °C)  Heart Rate:  [62-89] (P) 63  Resp:  [22-29] (P) 22  BP: ()/() 94/50  FiO2 (%):  [50 %] 50 %    Intake and output:    I/O last 3 completed shifts:  In: 4328 [I.V.:2058; Other:860; NG/GT:1110; IV Piggyback:300]  Out: 2175 [Urine:2175]  I/O this shift:  In: 351 [I.V.:111; Other:240]  Out: 850 [Urine:850]    Physical Examination:    General Appearance:   Sedated and intubated with ET tube in place.   Head:  Atraumatic and normocephalic, without obvious abnormality.   Eyes:          PERRLA, conjunctivae and sclerae normal, no Icterus. No pallor. Extraocular movements are within normal limits.   Neck: Supple, trachea midline, no thyromegaly.  IJ CVP in place.   Lungs:   Breath sounds heard bilaterally equally.  Bilateral crackles, right greater than left.   Heart:  Normal S1 and S2, no murmur, no gallop, no rub. No JVD   Abdomen:   Normal bowel sounds, no masses, no organomegaly. Soft, non-tender, non-distended, no guarding, no rebound tenderness.   Extremities: Moves all extremities well, trace bilateral  edema, no cyanosis, no clubbing.   Skin: No bleeding, bruising or rash.  Multiple ulcerations on upper and lower extremities.   Neurologic:  Remains sedated.  Was able to give me a  bilaterally.     Laboratory results:    Results from last 7 days   Lab Units 11/05/19 0439 11/04/19 0404 11/03/19  0359 11/02/19  0421  10/31/19  0500 10/30/19  1957   SODIUM mmol/L 147* 148* 145 142   < > 141 139   POTASSIUM mmol/L 3.9 3.6 3.4* 3.9   < > 3.6 3.9   CHLORIDE mmol/L 109* 110* 113* 114*   < > 109* 99   CO2 mmol/L 28.1 29.0 25.3 22.6   < > 23.3 18.8*   BUN mg/dL 25* 19 21* 27*   < > 19 18   CREATININE mg/dL 0.75* 0.73* 0.66* 0.83   < > 0.98 1.09   CALCIUM mg/dL 8.2* 8.4* 8.7 8.2*   < > 8.0* 9.3   BILIRUBIN mg/dL  --   --   --  0.3  --  0.4 0.2   ALK PHOS U/L  --   --   --  135*  --  100 140*   ALT (SGPT) U/L  --   --   --  17  --  32 39   AST (SGOT) U/L  --   --   --  21  --  38 50*   GLUCOSE mg/dL 109* 104* 130* 128*   < > 117* 315*    < > = values in this interval not displayed.     Results from last 7 days   Lab Units 11/05/19 0439 11/04/19 0404 11/03/19 0359   WBC 10*3/mm3 9.84 11.00* 14.62*   HEMOGLOBIN g/dL 8.6* 9.1* 9.6*   HEMATOCRIT % 27.4* 29.7* 31.2*   PLATELETS 10*3/mm3 231 241 273         Results from last 7 days   Lab Units 10/31/19  0500 10/30/19  1957   TROPONIN T ng/mL <0.010 <0.010     Results from last 7 days   Lab Units 11/04/19  1135 11/04/19  1130 10/31/19  1027 10/30/19  2137 10/30/19  2127   BLOODCX  No growth at 24 hours No growth at 24 hours  --  No growth at 5 days No growth at 5 days   MRSA SCREEN CX   --   --  No Methicillin Resistant Staphylococcus aureus isolated  --   --        I have reviewed the patient's laboratory results.    Radiology results:    Imaging Results (Last 24 Hours)     Procedure Component Value Units Date/Time    XR Chest 1 View [[id number]] Collected:  11/05/19 0957     Updated:  11/05/19 1001    Narrative:       PORTABLE CHEST     INDICATION: Respiratory failure.      FINDINGS: Single frontal portable chest, compared with 11/04/2019.  Patient is rotated to the right. EKG leads about the chest. Endotracheal  tube, nasogastric tube and left internal jugular venous catheter remain  in place. No pneumothorax. Prominent multifocal airspace parenchymal  infiltrates appear relatively similar to previous. No other interval  change.       Impression:       Persistence of bilateral infiltrates which may represent  pneumonia. Asymmetric edema or developing ARDS not entirely excluded.  Continued follow-up recommended.     This report was finalized on 11/5/2019 9:59 AM by Alejandro Stewart MD.          I have reviewed the patient's radiology reports.    Medication Review:     I have reviewed the patients active and prn medications.       Acute respiratory failure with hypoxia and hypercapnia (CMS/HCC)    Accidental drug overdose    Toxic encephalopathy    Sepsis (CMS/HCC)    Aspiration pneumonia of both lower lobes due to vomit (CMS/HCC)    Tobacco dependence    Drug abuse (CMS/HCC)    Hyperglycemia      Assessment:    Acute toxic encephalopathy, present on admission.  Acute hypoxic and hypercapnic respiratory failure secondary to #2, present on admission.  Acute drug overdose with opiates and methamphetamine, present on admission.  Septic Shock secondary to pna, present on admission.  Bilateral lower lobe aspiration pneumonia, present on admission.  Acute kidney injury likely secondary to ATN from hypotension due to septic shock-resolved  Hyperglycemia, likely secondary to diabetes, present on admission. - resolved  Hypertensive urgency, present on admission - resolved  Nicotine dependence.  Recreational drug abuse.  Multiple skin ulcers, present on admission.    Plan:    Patient remains on mechanical ventilation since 10/30/2019.  Failed weaning trial yesterday and likely today.  Still having intermittent fevers overnight.  Currently on Precedex for sedation.  Morning BMP with creatinine 0.75  "sodium 147.  Procalcitonin down to 2.72.  ABG with pH 7.45, PCO2 44, PO2 of 80.  White blood cell count of 9000.  Hemoglobin 8.6.  Cultures negative thus far.  Respiratory culture from 11/3/2019 growing Acinetobacter baumannii and candida albicans.  Pansensitive.  Chest x-ray from today with persistent bilateral infiltrates.    Per pulmonology, will make ventilation changes with decreasing respiratory rate of 20 and tidal volume to 500.  Continue with current antibiotics.  He is going to start Lasix as well.  Tube feeds per his recommendations.    Patient continues to meet ICU level of care.  He is on day 6 of mechanical ventilation.  Depending upon clinical improvement, he may need LTAC consideration.  Plan is care discussed with nursing and consultants.    Ange Esquivel DO  19  1:03 PM    Dictated utilizing Dragon dictation.    Electronically signed by Ange Esquivel DO at 19 [phone]     Bria Tamayo MD at 19 [phone]            CC: Acute Respiratory Failure.     S: Intubated and sedated.  Failed SBT yesterday again due to agitation, tachycardia and tachypnea. He was started on Fentanyl drip on 19 around 5 PM. Seems to be a little less agitated today?    ROS: Could not be obtained as the patient is on mechanical ventilator.    O:Vital signs reviewed.  FiO2: 50%. CVP Line. Day # 6. Vent Day # 6.  /65   Pulse 82   Temp (!) 101.5 °F (38.6 °C) (Oral)   Resp 22   Ht 170.2 cm (67.01\")   Wt 76.4 kg (168 lb 6.4 oz)   SpO2 93%   BMI 26.37 kg/m²      Temp (24hrs), Av.3 °F (37.9 °C), Min:99.2 °F (37.3 °C), Max:101.8 °F (38.8 °C)      I & Os reviewed.   Intake/Output       19 0700 - 19 0659    Intake (ml) 3565    Output (ml) 1575    Net (ml)           General/Constitutional: Intubated. Sedated.  Eyes: PERRL. 2-3 mm.   Neck: Supple without JVD. No obvious masses noted. Left IJ CVP line in place.   Cardiovascular: S1 + S2. Regular.    Lungs/Respiratory: " Transmitted Breath sounds noted. No wheezing heard. Right > Left crackles noted.  GI/Abdomen: Soft. Bowel sounds positive. No organomegaly noted.  Musculoskeletal/Extremities: Trace edema noted. Gait could not be assessed at this time, as the patient was laying in bed.   Neurologic: Sedated. Detailed exam couldn't be performed due to sedation.    Psych: Could not be performed as he is currently sedated.   Skin: Appeared with diffuse scattered ulcerations over upper and lower extremities.     Labs: Reviewed.   Results from last 7 days   Lab Units 11/05/19  0439 11/04/19  0404 11/03/19  0359 11/02/19  0421  10/31/19  0500 10/30/19  1957   SODIUM mmol/L 147* 148* 145 142   < > 141 139   POTASSIUM mmol/L 3.9 3.6 3.4* 3.9   < > 3.6 3.9   CHLORIDE mmol/L 109* 110* 113* 114*   < > 109* 99   CO2 mmol/L 28.1 29.0 25.3 22.6   < > 23.3 18.8*   BUN mg/dL 25* 19 21* 27*   < > 19 18   CREATININE mg/dL 0.75* 0.73* 0.66* 0.83   < > 0.98 1.09   CALCIUM mg/dL 8.2* 8.4* 8.7 8.2*   < > 8.0* 9.3   BILIRUBIN mg/dL  --   --   --  0.3  --  0.4 0.2   ALK PHOS U/L  --   --   --  135*  --  100 140*   ALT (SGPT) U/L  --   --   --  17  --  32 39   AST (SGOT) U/L  --   --   --  21  --  38 50*   GLUCOSE mg/dL 109* 104* 130* 128*   < > 117* 315*    < > = values in this interval not displayed.       Results from last 7 days   Lab Units 10/31/19  0500   MAGNESIUM mg/dL 1.7   PHOSPHORUS mg/dL 2.2*       Results from last 7 days   Lab Units 11/05/19 0439 11/04/19 0404 11/03/19 0359 11/02/19 0421 11/01/19 0411   WBC 10*3/mm3 9.84 11.00* 14.62* 10.38 16.36*   NEUTROPHIL % % 64.0 73.0 82.7*  --   --    HEMOGLOBIN g/dL 8.6* 9.1* 9.6* 9.1* 10.8*   HEMATOCRIT % 27.4* 29.7* 31.2* 30.6* 37.5   PLATELETS 10*3/mm3 231 241 273 235 308             Lab Results   Component Value Date    PROCALCITO 2.72 (H) 11/05/2019    PROCALCITO 7.46 (H) 11/03/2019    PROCALCITO 8.30 (H) 10/30/2019       Lab Results   Component Value Date    PROBNP 385.2 10/30/2019          dexmedetomidine 0.2-1.5 mcg/kg/hr Last Rate: 0.1 mcg/kg/hr (11/05/19 0629)   fentaNYL (SUBLIMAZE) infusion 10 mcg/ml  mcg/hr Last Rate: 200 mcg/hr (11/05/19 0511)   Pharmacy to dose vancomycin     Pharmacy to Dose Zosyn     propofol 5-50 mcg/kg/min Last Rate: 40 mcg/kg/min (11/05/19 0511)         Micro: As of November 5, 2019   Lab Results   Component Value Date    RESPCX Scant growth (1+) Gram Negative Bacilli (A) 11/03/2019     Lab Results   Component Value Date    BLOODCX No growth at less than 24 hours 11/04/2019    BLOODCX No growth at less than 24 hours 11/04/2019       Lab Results   Component Value Date    MRSACX  10/31/2019     No Methicillin Resistant Staphylococcus aureus isolated       ABG: Reviewed.  Lab Results   Component Value Date    PHART 7.454 11/05/2019    HCJ6UUZ 44.8 11/05/2019    PO2ART 80.4 11/05/2019    HGBBG 9.9 (L) 11/05/2019    [id number] 96.7 11/05/2019    CARBOXYHGB 0.8 11/05/2019       CXRay: Latest imaging study was reviewed personally.   Imaging Results (Last 24 Hours)     Procedure Component Value Units Date/Time    XR Chest 1 View [[id number]] Updated:  11/05/19 0622    XR Chest 1 View [[id number]] Collected:  11/04/19 0853     Updated:  11/04/19 0901    Narrative:       SINGLE VIEW CHEST     INDICATION: Respiratory failure.     FINDINGS: Single frontal view chest, compared with 11/03/2019. EKG leads  overlie the chest. Endotracheal tube terminates above the gilles. A left  internal jugular venous catheter and a nasogastric tube remain in place.  Persistence of bilateral multifocal airspace infiltrates appear similar  to slightly improved. No other significant change.       Impression:       Persistence of bilateral infiltrates as described. Continued  follow-up recommended.     This report was finalized on 11/4/2019 8:58 AM by Alejandro Stewart MD.            Assessment & Recommendations/Plan:   1.  Acute Respiratory Failure  On mechanical ventilation.  Will decrease the RR to  20/minute and TV to 500.   Will try extubation trial again later today.  On Precedex, Fentanyl and Propofol.     2.  Left sided ?aspiration? Pneumonia.   Procalcitonin level decreasing.    Will repeat procalcitonin level in 48 hours or so.  Sputum culture showing Gram Neg Bacilli.     3.  Respiratory acidosis  Improved.    4.  Septic Shock.  Resolved.     5.  Drug overdose  Toxicology screen was positive for multiple agents.    6.  Anemia  Likely dilutional.   S/P Lasix IV drip.     7.  Nutrition  Has been able to tolerate the tube feeds.  Will hold for now till we can assess his readiness for extubation.     8.   Fever.  Continue Abx.   If he has another spike, may need blood cultures?      Critical Care time spent in direct patient care: 40 minutes including high complexity decision making to assess, manipulate, and support vital organ system failure in this individual who has impairment of one or more vital organ systems such that there is a high probability of imminent or life threatening deterioration in the patient’s condition. This time excludes other billable procedures. Time does include preparation of documents, review of old records, and direct bedside care.    We have reviewed patient's current orders and changes, if any, have been suggested to primary care team. Plan was also discussed with nursing staff, as necessary.     This document was electronically signed by Bria Tamayo MD on 19 at 7:57 AM      Dictated utilizing Dragon dictation.        Electronically signed by Bria Tamayo MD at 19 0732     Ange Esquivel DO at 19 1045                [facility]    Name:  Eddie Prieto   Age:  44 y.o.  Sex:  male  :  1975  MRN:  [id number]   Visit Number:  [id number]  Admission Date:  10/30/2019  Date Of Service:  19  Primary Care Physician:  Shivani Canales APRN     LOS: 5 days :  Patient Care  Team:  Shivani Canales APRN as PCP - General (Family Medicine):    Chief Complaint:      Follow-up on septic shock, respiratory failure, drug overdose    Subjective / Interval History:     Patient is currently intubated and sedated on mechanical ventilation.  There was no family or friends at bedside.  No acute events overnight per nursing.  Per pulmonology, attempt to wean today.    Prior work-up:  44-year-old male with history of drug abuse was brought to the emergency room by EMS after they were called by someone who was driving the patient around stating that the patient became unresponsive.  The history is obtained from the emergency room physician and the medical record.  The patient was given 2 mg of IV Narcan by EMS with some improvement in his mental status but he was agitated and confused.  When he came to the emergency room he was ashen looking, diaphoretic and cyanotic with minimal respiratory effort.  Patient was given 2 more milligram of Narcan while getting Ambu bag ventilation with minimal response.  His oxygen saturation was 36% in the emergency room and he was subsequently intubated in the emergency room.  Patient required pressors on day of admission due to hypotension.  He was significantly tachycardic and was started on phenylephrine.  This was able to be titrated down over the following day his sinus tachycardia resolved and blood pressure stabilized.  Patient started on broad-spectrum antibiotic's with vancomycin and Zosyn.  Cultures obtained.    Review of Systems:   Complete review of systems is unobtainable secondary to patient's sedated and intubated status.    Vital Signs:    Temp:  [99 °F (37.2 °C)-101.5 °F (38.6 °C)] 99.4 °F (37.4 °C)  Heart Rate:  [81-97] 85  Resp:  [24-28] 24  BP: (137-164)/(77-97) 146/85  FiO2 (%):  [35 %-45 %] 45 %    Intake and output:    I/O last 3 completed shifts:  In: 3400 [I.V.:1669; Other:400; NG/GT:1203; IV Piggyback:128]  Out: 2225 [Urine:2225]  No  intake/output data recorded.    Physical Examination:    General Appearance:   Sedated with ET tube in place not in any acute distress.   Head:  Atraumatic and normocephalic, without obvious abnormality.   Eyes:          PERRLA, conjunctivae and sclerae normal, no Icterus. No pallor. Extraocular movements are within normal limits.   Neck: Supple, trachea midline, no thyromegaly.   Lungs:   Breath sounds heard bilaterally, diminished on left.  No crackles or wheezing. No Pleural rub or bronchial breathing.   Heart:  Normal S1 and S2, no murmur, no gallop, no rub. No JVD   Abdomen:   Normal bowel sounds, no masses, no organomegaly. Soft, non-tender, non-distended, no guarding, no rebound tenderness.   Extremities: Moves all extremities well, no edema, no cyanosis, no clubbing.   Skin: No bleeding, bruising or rash.  Skin no ulcerations noted upper and lower extremity   Neurologic:  Sedated      Laboratory results:    Results from last 7 days   Lab Units 11/04/19  0404 11/03/19  0359 11/02/19  0421  10/31/19  0500 10/30/19  1957   SODIUM mmol/L 148* 145 142   < > 141 139   POTASSIUM mmol/L 3.6 3.4* 3.9   < > 3.6 3.9   CHLORIDE mmol/L 110* 113* 114*   < > 109* 99   CO2 mmol/L 29.0 25.3 22.6   < > 23.3 18.8*   BUN mg/dL 19 21* 27*   < > 19 18   CREATININE mg/dL 0.73* 0.66* 0.83   < > 0.98 1.09   CALCIUM mg/dL 8.4* 8.7 8.2*   < > 8.0* 9.3   BILIRUBIN mg/dL  --   --  0.3  --  0.4 0.2   ALK PHOS U/L  --   --  135*  --  100 140*   ALT (SGPT) U/L  --   --  17  --  32 39   AST (SGOT) U/L  --   --  21  --  38 50*   GLUCOSE mg/dL 104* 130* 128*   < > 117* 315*    < > = values in this interval not displayed.     Results from last 7 days   Lab Units 11/04/19  0404 11/03/19  0359 11/02/19  0421   WBC 10*3/mm3 11.00* 14.62* 10.38   HEMOGLOBIN g/dL 9.1* 9.6* 9.1*   HEMATOCRIT % 29.7* 31.2* 30.6*   PLATELETS 10*3/mm3 241 273 235         Results from last 7 days   Lab Units 10/31/19  0500 10/30/19  1957   TROPONIN T ng/mL <0.010 <0.010      Results from last 7 days   Lab Units 10/31/19  1027 10/30/19  2137 10/30/19  2127   BLOODCX   --  No growth at 4 days No growth at 4 days   MRSA SCREEN CX  No Methicillin Resistant Staphylococcus aureus isolated  --   --        I have reviewed the patient's laboratory results.    Radiology results:    Imaging Results (Last 24 Hours)     Procedure Component Value Units Date/Time    XR Chest 1 View [[id number]] Collected:  11/04/19 0853     Updated:  11/04/19 0901    Narrative:       SINGLE VIEW CHEST     INDICATION: Respiratory failure.     FINDINGS: Single frontal view chest, compared with 11/03/2019. EKG leads  overlie the chest. Endotracheal tube terminates above the gilles. A left  internal jugular venous catheter and a nasogastric tube remain in place.  Persistence of bilateral multifocal airspace infiltrates appear similar  to slightly improved. No other significant change.       Impression:       Persistence of bilateral infiltrates as described. Continued  follow-up recommended.     This report was finalized on 11/4/2019 8:58 AM by Alejandro Stewart MD.    XR Chest 1 View [[id number]] Collected:  11/03/19 1019     Updated:  11/03/19 1053    Narrative:       XR CHEST 1 VW-     HISTORY: Resp Failure.; J96.01-Acute respiratory failure with hypoxia;  J96.02-Acute respiratory failure with hypercapnia; R79.89-Other  specified abnormal findings of blood chemistry; J69.0-Pneumonitis due to  inhalation of food and vomit; T50.904A-Poisoning by unspecified drugs,  medicaments and biological substances, undetermined, initial encounter;  A41.9-Sepsis, unspecified organism     COMPARISON: 2 days prior.     FINDINGS: The heart is normal in size. The mediastinum is unremarkable.  ET tube and NG tube are in stable position as is the left IJ catheter.  Bilateral airspace disease with mild sparing of the apices, left greater  than right, has improved slightly on the right, compared to prior exam.  There is no pneumothorax.  There  are no acute osseous abnormalities.  Postsurgical change noted in the cervical spine.       Impression:       1.  Improved airspace disease in the right lung compared to 2 days  prior; stable appearance on the left.  2.  Stable support lines and catheters from prior.     This report was finalized on 11/3/2019 10:51 AM by Aliza Andrea MD.          I have reviewed the patient's radiology reports.    Medication Review:     I have reviewed the patients active and prn medications.       Acute respiratory failure with hypoxia and hypercapnia (CMS/HCC)    Accidental drug overdose    Toxic encephalopathy    Sepsis (CMS/HCC)    Aspiration pneumonia of both lower lobes due to vomit (CMS/HCC)    Tobacco dependence    Drug abuse (CMS/[facility])    Hyperglycemia      Assessment:    Acute toxic encephalopathy, present on admission.  Acute hypoxic and hypercapnic respiratory failure secondary to #2, present on admission.  Acute drug overdose with opiates and methamphetamine, present on admission.  Septic Shock secondary to pna, present on admission.  Bilateral lower lobe aspiration pneumonia, present on admission.  Acute kidney injury likely secondary to ATN from hypotension due to septic shock-resolved  Hyperglycemia, likely secondary to diabetes, present on admission. - resolved  Hypertensive urgency, present on admission - resolved  Nicotine dependence.  Recreational drug abuse.  Multiple skin ulcers, present on admission.    Plan:    Patient on mechanical ventilation since 10/30/2019.  Has been followed by both hospitalist service, pulmonology, and nephrology.  He had failed weaning trial yesterday with tachycardia.  Dr. Tamayo plans for another weaning trial today with giving Precedex.  Hold tube feeds.    Did have temp 101.5, and 100.7 overnight.  Vitals otherwise stable.  ABG with PCO2 47.9 and pH 7.42.  BMP with creatinine 0.73 and mild hyponatremia 148.  White blood cell count of 11,000, hemoglobin 9, platelets 241.  Gram stain  "of sputum with 2+ yeast with hyphae and rare gram-positive cocci in chains.  Pro-Kenny was 7.46 yesterday.  Chest x-ray this morning with bilateral airspace disease with suggestion of improvement.  Blood cultures negative from 10/30.  He is on day 4 of Zosyn.  Vancomycin discontinued secondary to negative MRSA screen.  Euceda for DVT prophylaxis and Pepcid for GI prophylaxis.    With fever overnight, will restart vancomycin and repeat cultures today.  Will follow pulmonology recommendations in regards to weaning and extubation.  Patient will remain in ICU otherwise.  Anticipate another 2 to 3-day stay at the minimum.    Ange Esquivel DO  19  10:45 AM    Dictated utilizing Dragon dictation.    Electronically signed by Ange Esquivel DO at 19 1054     Bria Tamayo MD at 19 0815            CC: Acute Respiratory Failure.     S: Intubated and sedated.  Failed SBT yesterday due to agitation, tachycardia and tachypnea.     ROS: Could not be obtained as the patient is on mechanical ventilator.    O:Vital signs reviewed.  FiO2: 40%. CVP Line. Day # 5. Vent Day # 5.  /85 (BP Location: Left arm, Patient Position: Lying)   Pulse 85   Temp 99.4 °F (37.4 °C) (Oral)   Resp 24   Ht 170.2 cm (67.01\")   Wt 76.4 kg (168 lb 6.4 oz)   SpO2 94%   BMI 26.37 kg/m²      Temp (24hrs), Av.9 °F (37.7 °C), Min:99 °F (37.2 °C), Max:101.5 °F (38.6 °C)      I & Os reviewed.   Intake/Output       19 0700 - 19 0659    Intake (ml) 1803    Output (ml) 1475    Net (ml) 328    Last Weight  76.4 kg (168 lb 6.4 oz)          General/Constitutional: Intubated. Sedated.  Eyes: PERRL. 2-3 mm.   Neck: Supple without JVD. No obvious masses noted. Left IJ CVP line in place.   Cardiovascular: S1 + S2. Regular.    Lungs/Respiratory: Transmitted Breath sounds noted. No wheezing heard. Right > Left crackles noted.  GI/Abdomen: Soft. Bowel sounds positive. No organomegaly " noted.  Musculoskeletal/Extremities: Trace edema noted. Gait could not be assessed at this time, as the patient was laying in bed.   Neurologic: Sedated. Detailed exam couldn't be performed due to sedation. As per RN, he became agitated on sedation vacation.    Psych: Could not be performed as he is currently sedated.   Skin: Appeared with diffuse scattered ulcerations over upper and lower extremities.     Labs: Reviewed.   Results from last 7 days   Lab Units 11/04/19  0404 11/03/19  0359 11/02/19  0421  10/31/19  0500 10/30/19  1957   SODIUM mmol/L 148* 145 142   < > 141 139   POTASSIUM mmol/L 3.6 3.4* 3.9   < > 3.6 3.9   CHLORIDE mmol/L 110* 113* 114*   < > 109* 99   CO2 mmol/L 29.0 25.3 22.6   < > 23.3 18.8*   BUN mg/dL 19 21* 27*   < > 19 18   CREATININE mg/dL 0.73* 0.66* 0.83   < > 0.98 1.09   CALCIUM mg/dL 8.4* 8.7 8.2*   < > 8.0* 9.3   BILIRUBIN mg/dL  --   --  0.3  --  0.4 0.2   ALK PHOS U/L  --   --  135*  --  100 140*   ALT (SGPT) U/L  --   --  17  --  32 39   AST (SGOT) U/L  --   --  21  --  38 50*   GLUCOSE mg/dL 104* 130* 128*   < > 117* 315*    < > = values in this interval not displayed.       Results from last 7 days   Lab Units 10/31/19  0500   MAGNESIUM mg/dL 1.7   PHOSPHORUS mg/dL 2.2*       Results from last 7 days   Lab Units 11/04/19  0404 11/03/19  0359 11/02/19  0421 11/01/19  0411 10/31/19  0500   WBC 10*3/mm3 11.00* 14.62* 10.38 16.36* 2.64*   NEUTROPHIL % % 73.0 82.7*  --   --   --    HEMOGLOBIN g/dL 9.1* 9.6* 9.1* 10.8* 12.0*   HEMATOCRIT % 29.7* 31.2* 30.6* 37.5 40.3   PLATELETS 10*3/mm3 241 273 235 308 267             Lab Results   Component Value Date    PROCALCITO 7.46 (H) 11/03/2019    PROCALCITO 8.30 (H) 10/30/2019       Lab Results   Component Value Date    PROBNP 385.2 10/30/2019         midazolam (VERSED) infusion 1 mg/hr Last Rate: 1 mg/hr (11/03/19 2230)   Pharmacy to Dose Zosyn     propofol 5-50 mcg/kg/min Last Rate: 50 mcg/kg/min (11/04/19 0337)         Micro: As of November  4, 2019   No results found for: RESPCX  Lab Results   Component Value Date    BLOODCX No growth at 4 days 10/30/2019    BLOODCX No growth at 4 days 10/30/2019       Lab Results   Component Value Date    MRSACX  10/31/2019     No Methicillin Resistant Staphylococcus aureus isolated           ABG: Reviewed.  Lab Results   Component Value Date    PHART 7.426 11/04/2019    QZZ0WHO 47.9 (H) 11/04/2019    PO2ART 79.4 11/04/2019    HGBBG 9.6 (L) 11/04/2019    V5TRGHVV 96.8 11/04/2019    CARBOXYHGB 0.8 11/04/2019         CXRay: Latest imaging study was reviewed personally.   Imaging Results (Last 24 Hours)     Procedure Component Value Units Date/Time    XR Chest 1 View [[id number]] Updated:  11/04/19 0532    XR Chest 1 View [[id number]] Collected:  11/03/19 1019     Updated:  11/03/19 1053    Narrative:       XR CHEST 1 VW-     HISTORY: Resp Failure.; J96.01-Acute respiratory failure with hypoxia;  J96.02-Acute respiratory failure with hypercapnia; R79.89-Other  specified abnormal findings of blood chemistry; J69.0-Pneumonitis due to  inhalation of food and vomit; T50.904A-Poisoning by unspecified drugs,  medicaments and biological substances, undetermined, initial encounter;  A41.9-Sepsis, unspecified organism     COMPARISON: 2 days prior.     FINDINGS: The heart is normal in size. The mediastinum is unremarkable.  ET tube and NG tube are in stable position as is the left IJ catheter.  Bilateral airspace disease with mild sparing of the apices, left greater  than right, has improved slightly on the right, compared to prior exam.  There is no pneumothorax.  There are no acute osseous abnormalities.  Postsurgical change noted in the cervical spine.       Impression:       1.  Improved airspace disease in the right lung compared to 2 days  prior; stable appearance on the left.  2.  Stable support lines and catheters from prior.     This report was finalized on 11/3/2019 10:51 AM by Aliza Andrea MD.            Assessment &  Recommendations/Plan:   1.  Acute Respiratory Failure  On mechanical ventilation.  Will try extubation trial again today.  We will try combination of Precedex and morphine to see if we can extubate the patient.    2.  Left sided ?aspiration? Pneumonia.   Procalcitonin level decreasing.   Will repeat procalcitonin level in the morning.  Sputum culture was ordered.     3.  Respiratory acidosis  Improved.    4.  Septic Shock.  Resolved.     5.  Drug overdose  Toxicology screen was positive for multiple agents.  On Morphine PRN as he does have history of drug overdose.   Will ask nursing staff to use morphine somewhat more regularly, especially when he is intubated.  After extubation however, morphine should be used only as needed.  Will start Precedex drip and stop versed.     6.  Acute kidney injury  Resolved.     7.  Nutrition  Has been able to tolerate the tube feeds.    Critical Care time spent in direct patient care: 40 minutes including high complexity decision making to assess, manipulate, and support vital organ system failure in this individual who has impairment of one or more vital organ systems such that there is a high probability of imminent or life threatening deterioration in the patient’s condition. This time excludes other billable procedures. Time does include preparation of documents, review of old records, and direct bedside care.    We have reviewed patient's current orders and changes, if any, have been suggested to primary care team. Plan was also discussed with nursing staff, as necessary.     This document was electronically signed by Bria Tamayo MD on 19 at 8:15 AM      Dictated utilizing Dragon dictation.        Electronically signed by Bria Tamayo MD at 19 1320     Stephan Parker DO at 19 1400                [facility]    [facility]    Name:  Eddie Prieto   Age:  44 y.o.  Sex:  male  :  1975  MRN:   [id number]   Visit Number:  [id number]  Admission Date:  10/30/2019  Date Of Service:  11/03/19  Primary Care Physician:  Shivani Canales APRN     LOS: 4 days :  Patient Care Team:  Shivani Canales APRN as PCP - General (Family Medicine):    Chief Complaint:      Patient seen in follow-up for septic shock and drug overdose    Subjective / Interval History:     This morning patient remained intubated/sedated.  No family was present.  Subjective history was unable to be obtained.  No acute events were reported by nursing.  Tube feeds have been held in preparation for weaning trial later today.  Sedation has been decreased.  No meaningful movement or ability to follow commands at the time of my exam.    Hospital course:  44-year-old male with history of drug abuse was brought to the emergency room by EMS after they were called by someone who was driving the patient around stating that the patient became unresponsive.  The history is obtained from the emergency room physician and the medical record.  The patient was given 2 mg of IV Narcan by EMS with some improvement in his mental status but he was agitated and confused.  When he came to the emergency room he was ashen looking, diaphoretic and cyanotic with minimal respiratory effort.  Patient was given 2 more milligram of Narcan while getting Ambu bag ventilation with minimal response.  His oxygen saturation was 36% in the emergency room and he was subsequently intubated in the emergency room.  Patient required pressors on day of admission due to hypotension.  He was significantly tachycardic and was started on phenylephrine.  This was able to be titrated down over the following day his sinus tachycardia resolved and blood pressure stabilized.  Patient started on broad-spectrum antibiotic's with vancomycin and Zosyn.  Cultures obtained.    Review of Systems:     Unable to obtain    Vital Signs:    Temp:  [98.7 °F (37.1 °C)-99.3 °F (37.4 °C)] 99.3 °F (37.4  °C)  Heart Rate:  [] 97  Resp:  [24-32] 24  BP: (134-169)/(75-97) 155/91  FiO2 (%):  [35 %] 35 %    Intake and output:    I/O last 3 completed shifts:  In: 6173 [I.V.:4213; Other:674; NG/GT:1286]  Out: 2150 [Urine:2150]  I/O this shift:  In: 458 [I.V.:190; Other:100; NG/GT:118; IV Piggyback:50]  Out: 350 [Urine:350]    Physical Examination:    General Appearance:   Intubated/sedated   Head:  Atraumatic and normocephalic, without obvious abnormality.   Eyes:          conjunctivae and sclerae normal, no Icterus. No pallor.    Neck: Supple, trachea midline, no thyromegaly,    Lungs:   Chest shape is normal. Breath sounds heard bilaterally equally.  No crackles or wheezing.    Heart:  Normal S1 and S2, no murmur, No JVD   Abdomen:   Normal bowel sounds, no masses, . Soft,  non-distended,    Extremities: no edema, no cyanosis, no clubbing.   Skin: No bleeding, bruising or rash.  Multiple skin ulcers   Neurologic:  Sedated and unable to follow commands.     Laboratory results:    Results from last 7 days   Lab Units 11/03/19  0359 11/02/19  0421 11/01/19  1559  10/31/19  0500 10/30/19  1957   SODIUM mmol/L 145 142 140   < > 141 139   POTASSIUM mmol/L 3.4* 3.9 4.4   < > 3.6 3.9   CHLORIDE mmol/L 113* 114* 112*   < > 109* 99   CO2 mmol/L 25.3 22.6 21.4*   < > 23.3 18.8*   BUN mg/dL 21* 27* 30*   < > 19 18   CREATININE mg/dL 0.66* 0.83 1.12   < > 0.98 1.09   CALCIUM mg/dL 8.7 8.2* 7.8*   < > 8.0* 9.3   BILIRUBIN mg/dL  --  0.3  --   --  0.4 0.2   ALK PHOS U/L  --  135*  --   --  100 140*   ALT (SGPT) U/L  --  17  --   --  32 39   AST (SGOT) U/L  --  21  --   --  38 50*   GLUCOSE mg/dL 130* 128* 113*   < > 117* 315*    < > = values in this interval not displayed.     Results from last 7 days   Lab Units 11/03/19  0359 11/02/19  0421 11/01/19  0411   WBC 10*3/mm3 14.62* 10.38 16.36*   HEMOGLOBIN g/dL 9.6* 9.1* 10.8*   HEMATOCRIT % 31.2* 30.6* 37.5   PLATELETS 10*3/mm3 273 235 308         Results from last 7 days   Lab  Units 10/31/19  0500 10/30/19  1957   TROPONIN T ng/mL <0.010 <0.010     Results from last 7 days   Lab Units 10/31/19  1027 10/30/19  2137 10/30/19  2127   BLOODCX   --  No growth at 3 days No growth at 3 days   MRSA SCREEN CX  No Methicillin Resistant Staphylococcus aureus isolated  --   --        I have reviewed the patient's laboratory results.    Radiology results:    Imaging Results (Last 24 Hours)     Procedure Component Value Units Date/Time    XR Chest 1 View [[id number]] Collected:  11/03/19 1019     Updated:  11/03/19 1053    Narrative:       XR CHEST 1 VW-     HISTORY: Resp Failure.; J96.01-Acute respiratory failure with hypoxia;  J96.02-Acute respiratory failure with hypercapnia; R79.89-Other  specified abnormal findings of blood chemistry; J69.0-Pneumonitis due to  inhalation of food and vomit; T50.904A-Poisoning by unspecified drugs,  medicaments and biological substances, undetermined, initial encounter;  A41.9-Sepsis, unspecified organism     COMPARISON: 2 days prior.     FINDINGS: The heart is normal in size. The mediastinum is unremarkable.  ET tube and NG tube are in stable position as is the left IJ catheter.  Bilateral airspace disease with mild sparing of the apices, left greater  than right, has improved slightly on the right, compared to prior exam.  There is no pneumothorax.  There are no acute osseous abnormalities.  Postsurgical change noted in the cervical spine.       Impression:       1.  Improved airspace disease in the right lung compared to 2 days  prior; stable appearance on the left.  2.  Stable support lines and catheters from prior.     This report was finalized on 11/3/2019 10:51 AM by Aliza Andrea MD.          I have reviewed the patient's radiology reports.    Medication Review:     I have reviewed the patients active and prn medications.       Acute respiratory failure with hypoxia and hypercapnia (CMS/HCC)    Accidental drug overdose    Toxic encephalopathy    Sepsis  (CMS/[facility])    Aspiration pneumonia of both lower lobes due to vomit (CMS/[facility])    Tobacco dependence    Drug abuse (CMS/[facility])    Hyperglycemia      Assessment:    Acute toxic encephalopathy, present on admission.  Acute hypoxic and hypercapnic respiratory failure secondary to #2, present on admission.  Acute drug overdose with opiates and methamphetamine, present on admission.  Septic Shock secondary to pna, present on admission.  Bilateral lower lobe aspiration pneumonia, present on admission.  Acute kidney injury likely secondary to ATN from hypotension due to septic shock-resolved  Hyperglycemia, likely secondary to diabetes, present on admission. - resolved  Hypertensive urgency, present on admission - resolved  Nicotine dependence.  Recreational drug abuse.  Multiple skin ulcers, present on admission.    Plan:    Patient remain intubated/sedated with propofol and Versed in the ICU today.  He failed weaning trial secondary to unstable vital signs with tachycardia.  Will attempt weaning trial again tomorrow.  Dr. Tamayo is consulted and following.  Have restarted tube feeds since failed extubation.  Septic shock has resolved.  Vancomycin was discontinued secondary to negative MRSA screen.  We will continue with Zosyn.  Blood cultures without growth at 3 days.  Renal function has resolved.  HOLLY was all likely secondary to ATN from hypotension/septic shock.   Dr. Arora has signed off.  Discontinue maintenance fluids.  Lovenox for DVT prophylaxis and Pepcid for GI prophylaxis.    Stephan Parker DO  11/03/19  2:00 PM    Dictated utilizing Dragon dictation.      Electronically signed by Stephan Parker DO at 11/03/19 1409     Bria Tamayo MD at 11/03/19 1100            CC: Acute Respiratory Failure.     S: Intubated and sedated.  Appears somewhat agitated.    ROS: Could not be obtained as the patient is on mechanical ventilator.    O:Vital signs reviewed.  FiO2: 40%. CVP Line. Day # 4. Vent Day #  "4  /86   Pulse 86   Temp 99.2 °F (37.3 °C) (Oral)   Resp 24   Ht 170.2 cm (67.01\")   Wt 78.1 kg (172 lb 3.2 oz)   SpO2 93%   BMI 26.96 kg/m²      Temp (24hrs), Av.9 °F (37.2 °C), Min:98.2 °F (36.8 °C), Max:99.3 °F (37.4 °C)      I & Os reviewed.   Intake/Output       19 0700 - 19 0659 19 0700 - 19 0659    Intake (ml) 4040 218    Output (ml) 1400 350    Net (ml) 2640 -132    Last Weight  78.1 kg (172 lb 3.2 oz)  --          General/Constitutional: Intubated. Sedated.  Eyes: PERRL. 3 mm.   Neck: Supple without JVD. No obvious masses noted. Left IJ CVP line in place.   Cardiovascular: S1 + S2. Regular.    Lungs/Respiratory: Transmitted Breath sounds noted. No wheezing heard. Right > Left crackles noted.  GI/Abdomen: Soft. Bowel sounds positive. No organomegaly noted.  Musculoskeletal/Extremities: Trace edema noted. Gait could not be assessed at this time, as the patient was laying in bed.   Neurologic: Sedated. Detailed exam couldn't be performed due to sedation. As per RN, he became agitated on sedation vacation.    Psych: Could not be performed as he is currently sedated.   Skin: Appeared with diffuse scattered ulcerations over upper and lower extremities.     Labs: Reviewed.   Results from last 7 days   Lab Units 19  0359 19  0421 19  1559  10/31/19  0500 10/30/19  1957   SODIUM mmol/L 145 142 140   < > 141 139   POTASSIUM mmol/L 3.4* 3.9 4.4   < > 3.6 3.9   CHLORIDE mmol/L 113* 114* 112*   < > 109* 99   CO2 mmol/L 25.3 22.6 21.4*   < > 23.3 18.8*   BUN mg/dL 21* 27* 30*   < > 19 18   CREATININE mg/dL 0.66* 0.83 1.12   < > 0.98 1.09   CALCIUM mg/dL 8.7 8.2* 7.8*   < > 8.0* 9.3   BILIRUBIN mg/dL  --  0.3  --   --  0.4 0.2   ALK PHOS U/L  --  135*  --   --  100 140*   ALT (SGPT) U/L  --  17  --   --  32 39   AST (SGOT) U/L  --  21  --   --  38 50*   GLUCOSE mg/dL 130* 128* 113*   < > 117* 315*    < > = values in this interval not displayed.       Results from " last 7 days   Lab Units 10/31/19  0500   MAGNESIUM mg/dL 1.7   PHOSPHORUS mg/dL 2.2*       Results from last 7 days   Lab Units 11/03/19  0359 11/02/19  0421 11/01/19  0411 10/31/19  0500 10/30/19  1957   WBC 10*3/mm3 14.62* 10.38 16.36* 2.64* 21.80*   NEUTROPHIL % % 82.7*  --   --   --   --    HEMOGLOBIN g/dL 9.6* 9.1* 10.8* 12.0* 12.3*   HEMATOCRIT % 31.2* 30.6* 37.5 40.3 42.3   PLATELETS 10*3/mm3 273 235 308 267 460*             Lab Results   Component Value Date    PROCALCITO 7.46 (H) 11/03/2019    PROCALCITO 8.30 (H) 10/30/2019       Lab Results   Component Value Date    PROBNP 385.2 10/30/2019         midazolam (VERSED) infusion 1 mg/hr Last Rate: Stopped (11/03/19 0800)   Pharmacy to Dose Zosyn     propofol 5-50 mcg/kg/min Last Rate: 25 mcg/kg/min (11/03/19 0900)         Micro: As of November 3, 2019   No results found for: RESPCX  Lab Results   Component Value Date    BLOODCX No growth at 3 days 10/30/2019    BLOODCX No growth at 3 days 10/30/2019       Lab Results   Component Value Date    MRSACX  10/31/2019     No Methicillin Resistant Staphylococcus aureus isolated           ABG: Reviewed.  Lab Results   Component Value Date    PHART 7.401 11/03/2019    RZX2VBZ 44.7 11/03/2019    PO2ART 73.6 (L) 11/03/2019    HGBBG 10.1 (L) 11/03/2019    R3GVYIKT 96.3 11/03/2019    CARBOXYHGB 0.7 11/03/2019         CXRay: Latest imaging study was reviewed personally.   Imaging Results (Last 24 Hours)     Procedure Component Value Units Date/Time    XR Chest 1 View [[id number]] Collected:  11/03/19 1019     Updated:  11/03/19 1053    Narrative:       XR CHEST 1 VW-     HISTORY: Resp Failure.; J96.01-Acute respiratory failure with hypoxia;  J96.02-Acute respiratory failure with hypercapnia; R79.89-Other  specified abnormal findings of blood chemistry; J69.0-Pneumonitis due to  inhalation of food and vomit; T50.904A-Poisoning by unspecified drugs,  medicaments and biological substances, undetermined, initial  encounter;  A41.9-Sepsis, unspecified organism     COMPARISON: 2 days prior.     FINDINGS: The heart is normal in size. The mediastinum is unremarkable.  ET tube and NG tube are in stable position as is the left IJ catheter.  Bilateral airspace disease with mild sparing of the apices, left greater  than right, has improved slightly on the right, compared to prior exam.  There is no pneumothorax.  There are no acute osseous abnormalities.  Postsurgical change noted in the cervical spine.       Impression:       1.  Improved airspace disease in the right lung compared to 2 days  prior; stable appearance on the left.  2.  Stable support lines and catheters from prior.     This report was finalized on 11/3/2019 10:51 AM by Aliza Andrea MD.            Assessment & Recommendations/Plan:   1.  Acute Respiratory Failure  On mechanical ventilation  Will try extubation trial today.    2.  Left sided ?aspiration? Pneumonia.   Procalcitonin level decreasing.   I am not entirely clear of the sputum culture was ever collected and sent although it is mentioned in the H&P?  Since the patient has been on antibiotics for the past 4 days, the yield of cultures will be extremely poor but nonetheless, we will have the respiratory therapist to collect sputum culture and send it    3.  Respiratory acidosis  Improved.    4.  Septic Shock.  Resolved.     5.  Drug overdose  Toxicology screen was positive for multiple agents.  We will start morphine PRN as he does have history of drug overdose.   Another possibility would be to consider starting Precedex or fentanyl drip.    6.  Acute kidney injury  Likely from septic shock.  Resolved.     7.  Nutrition  Has been able to tolerate the tube feeds.  If the patient fails extubation, we will restart the tube feeds.    Critical Care time spent in direct patient care: 40 minutes including high complexity decision making to assess, manipulate, and support vital organ system failure in this individual  "who has impairment of one or more vital organ systems such that there is a high probability of imminent or life threatening deterioration in the patient’s condition. This time excludes other billable procedures. Time does include preparation of documents, review of old records, and direct bedside care.    We have reviewed patient's current orders and changes, if any, have been suggested to primary care team. Plan was also discussed with nursing staff, as necessary.     This document was electronically signed by Bria Tamayo MD on 11/03/19 at 11:00 AM      Dictated utilizing Dragon dictation.        Electronically signed by Bria Tamayo MD at 11/03/19 1243     Alex Arora MD, FASN at 11/03/19 0851          Nephrology Progress Note.    LOS: 4 days    Patient Care Team:  Shivani Canales APRN as PCP - General (Family Medicine)    Chief Complaint:    Chief Complaint   Patient presents with   • Drug Overdose       Subjective:   Follow up for HOLLY and related issues.  Interval History:   Patient Complaints: none  Patient seen and examined this morning.  Events from last night noted.  Patient still intubated and sedated, increased urine output noted.  Patient's mother is in the room details discussed with her as well.    Objective:    Vital Signs  /85 (BP Location: Left arm, Patient Position: Lying)   Pulse 95   Temp 99.2 °F (37.3 °C) (Oral)   Resp 24   Ht 170.2 cm (67.01\")   Wt 78.1 kg (172 lb 3.2 oz)   SpO2 93%   BMI 26.96 kg/m²      I/O this shift:  In: 218 [Other:100; NG/GT:118]  Out: 210 [Urine:210]    Intake/Output Summary (Last 24 hours) at 11/3/2019 0851  Last data filed at 11/3/2019 0800  Gross per 24 hour   Intake 4258 ml   Output 1610 ml   Net 2648 ml       Physical Exam:  General Appearance: Intubated, no acute distress,   HEENT: Oral mucosa dry, extra occular movements intact. Sclera clear.  Skin: Warm and dry  Neck: supple, no JVD, trachea midline  Lungs:Chest shape is normal. " Breath sounds heard bilaterally equally. No crackles, No wheezing.   Heart: regular rate and rhythm. normal S1 and S2, no S3, no rub, peripheral pulses weak but palpable.  Abdomen: Obese, soft, non-tender,  present bowel sounds to auscultation  : no palpable bladder.  Extremities: 1+ edema, no cyanosis or clubbing.   Neuro: grossly non focal.     Results Review:   Results from last 7 days   Lab Units 11/03/19 0359 11/02/19 0421 11/01/19  1559  10/31/19  0500 10/30/19  1957   SODIUM mmol/L 145 142 140   < > 141 139   POTASSIUM mmol/L 3.4* 3.9 4.4   < > 3.6 3.9   CHLORIDE mmol/L 113* 114* 112*   < > 109* 99   CO2 mmol/L 25.3 22.6 21.4*   < > 23.3 18.8*   BUN mg/dL 21* 27* 30*   < > 19 18   CREATININE mg/dL 0.66* 0.83 1.12   < > 0.98 1.09   CALCIUM mg/dL 8.7 8.2* 7.8*   < > 8.0* 9.3   ALBUMIN g/dL  --  2.00*  --   --  2.60* 3.60   BILIRUBIN mg/dL  --  0.3  --   --  0.4 0.2   ALK PHOS U/L  --  135*  --   --  100 140*   ALT (SGPT) U/L  --  17  --   --  32 39   AST (SGOT) U/L  --  21  --   --  38 50*   GLUCOSE mg/dL 130* 128* 113*   < > 117* 315*    < > = values in this interval not displayed.     Estimated Creatinine Clearance: 156.1 mL/min (A) (by C-G formula based on SCr of 0.66 mg/dL (L)).  Results from last 7 days   Lab Units 10/31/19  0500   MAGNESIUM mg/dL 1.7   PHOSPHORUS mg/dL 2.2*         Results from last 7 days   Lab Units 11/03/19 0359 11/02/19 0421 11/01/19  0411 10/31/19  0500 10/30/19  1957   WBC 10*3/mm3 14.62* 10.38 16.36* 2.64* 21.80*   HEMOGLOBIN g/dL 9.6* 9.1* 10.8* 12.0* 12.3*   PLATELETS 10*3/mm3 273 235 308 267 460*         Brief Urine Lab Results  (Last result in the past 365 days)      Color   Clarity   Blood   Leuk Est   Nitrite   Protein   CREAT   Urine HCG        11/01/19 1522 Yellow Cloudy Negative Negative Negative 30 mg/dL (1+)             No results found for: UTPCR  Imaging Results (Last 24 Hours)     Procedure Component Value Units Date/Time    XR Chest 1 View [[id number]] Updated:   11/03/19 0524          chlorhexidine 15 mL Mouth/Throat Q12H   enoxaparin 40 mg Subcutaneous Q24H   famotidine 20 mg Intravenous Q12H   insulin regular 0-7 Units Subcutaneous Q6H   lactobacillus acidophilus 1 capsule Oral BID   multivitamin with iron-minerals 15 mL Oral Daily   piperacillin-tazobactam 3.375 g Intravenous Q8H   potassium chloride 20 mEq Intravenous Once   sodium chloride 10 mL Intravenous Q12H   sodium chloride 10 mL Intravenous Q12H   sodium chloride 10 mL Intravenous Q12H   sodium chloride 10 mL Intravenous Q12H       midazolam (VERSED) infusion 1 mg/hr Last Rate: 1 mg/hr (11/01/19 1838)   Pharmacy to Dose Zosyn     propofol 5-50 mcg/kg/min Last Rate: 30 mcg/kg/min (11/03/19 0511)   sodium chloride 50 mL/hr Last Rate: 50 mL/hr (11/03/19 0029)         Medication Review:   Current Facility-Administered Medications   Medication Dose Route Frequency Provider Last Rate Last Dose   • acetaminophen (TYLENOL) tablet 650 mg  650 mg Oral Q4H PRN Prasanna Segal MD        Or   • acetaminophen (TYLENOL) 160 MG/5ML solution 650 mg  650 mg Oral Q4H PRN Prasanna Segal MD   650 mg at 11/02/19 0002    Or   • acetaminophen (TYLENOL) suppository 650 mg  650 mg Rectal Q4H PRN Prasanna Segal MD       • chlorhexidine (PERIDEX) 0.12 % solution 15 mL  15 mL Mouth/Throat Q12H Prasanna Segal MD   15 mL at 11/03/19 0802   • dextrose (D50W) 25 g/ 50mL Intravenous Solution 25 g  25 g Intravenous Q15 Min PRN Prasanna Segal MD       • dextrose (GLUTOSE) oral gel 1 tube  1 tube Oral Q15 Min PRN Prasanna Segal MD       • enoxaparin (LOVENOX) syringe 40 mg  40 mg Subcutaneous Q24H Prasanna Segal MD   40 mg at 11/03/19 0018   • famotidine (PEPCID) injection 20 mg  20 mg Intravenous Q12H Prasanna Segal MD   20 mg at 11/03/19 0802   • glucagon (human recombinant) (GLUCAGEN DIAGNOSTIC) injection 1 mg  1 mg Subcutaneous PRN Prasanna Segal MD       • insulin regular (humuLIN R,novoLIN R) injection 0-7 Units  0-7 Units Subcutaneous Q6H Luzma,  MD Prasanna       • labetalol (NORMODYNE,TRANDATE) injection 10 mg  10 mg Intravenous Q4H PRN Prasanna Segal MD       • lactobacillus acidophilus (RISAQUAD) capsule 1 capsule  1 capsule Oral BID Prasanna Segal MD   1 capsule at 11/03/19 0802   • midazolam (VERSED) 0.5 mg/mL in sodium chloride 0.9 % 100 mL infusion  1 mg/hr Intravenous Titrated Prasanna Segal MD 2 mL/hr at 11/01/19 1838 1 mg/hr at 11/01/19 1838   • Morphine sulfate (PF) injection 4 mg  4 mg Intravenous Q2H PRN Prasanna Segal MD   4 mg at 10/30/19 2325   • multivitamin with iron-minerals liquid 15 mL  15 mL Oral Daily Prasanna Segal MD   15 mL at 11/03/19 0801   • ondansetron (ZOFRAN) injection 4 mg  4 mg Intravenous Q6H PRN Prasanna Segal MD       • Pharmacy to Dose Zosyn   Does not apply Continuous PRN Prasanna Segal MD       • piperacillin-tazobactam (ZOSYN) 3.375 g in iso-osmotic dextrose 50 ml (premix)  3.375 g Intravenous Q8H Prasanna Segal MD   3.375 g at 11/03/19 0412   • potassium chloride 20 mEq in 50 mL IVPB  20 mEq Intravenous Once Stephan Parker DO       • propofol (DIPRIVAN) infusion 10 mg/mL 100 mL  5-50 mcg/kg/min Intravenous Titrated Smooth Powers,  12.92 mL/hr at 11/03/19 0511 30 mcg/kg/min at 11/03/19 0511   • sodium chloride 0.9 % flush 10 mL  10 mL Intravenous Q12H Prasanna Segal MD   10 mL at 11/02/19 2101   • sodium chloride 0.9 % flush 10 mL  10 mL Intravenous PRN Prasanna Segal MD       • sodium chloride 0.9 % flush 10 mL  10 mL Intravenous Q12H Bria Tamayo MD   10 mL at 11/02/19 2101   • sodium chloride 0.9 % flush 10 mL  10 mL Intravenous Q12H Bria Tamayo MD   10 mL at 11/02/19 2100   • sodium chloride 0.9 % flush 10 mL  10 mL Intravenous Q12H Bria Tamayo MD   10 mL at 11/02/19 2047   • sodium chloride 0.9 % flush 10 mL  10 mL Intravenous PRN Bria Tamayo MD       • sodium chloride 0.9 % flush 20 mL  20 mL Intravenous PRN Bria Tamayo MD       • sodium chloride 0.9 % infusion  50 mL/hr  Intravenous Continuous Alex Arora MD, FASN 50 mL/hr at 11/03/19 0029 50 mL/hr at 11/03/19 0029       Assessment/Plan:    1. Acute renal failure:  2. Hyperkalemia:  3. Acute respiratory failure with hypoxia and hypercapnia (CMS/[facility])  4. Accidental drug overdose  5. Toxic encephalopathy  6. Sepsis (CMS/[facility])  7. Aspiration pneumonia of both lower lobes due to vomit (CMS/[facility])  8. Tobacco dependence  9. Drug abuse (CMS/[facility])  10. Hyperglycemia    Plan:  · Continue with the current treatment plan.  · Renal functions back to baseline.  · Low potassium noted we will give 1 dose of Aldactone 25 mg.  He does appear to have some extra volume as well.  · May end up requiring diuretics.  · He has been off the pressors, likely extubation planned later today.  · Details were discussed with the nursing staff no family in the room.    · Details were also discussed with the hospitalist service.  I will sign off please call if needed  · Surveillance labs.  · Further recommendations will depend on clinical course of the patient during the current hospitalization.    · I also discussed the details with the nursing staff.  · Rest as ordered.    Alex Arora MD, FASN  11/03/19  8:51 AM    Dictated utilizing Dragon dictation.    Electronically signed by Alex Arora MD, FASN at 11/03/19 1103     Alex Arora MD, FASN at 11/02/19 0819          Nephrology Progress Note.    LOS: 3 days    Patient Care Team:  Shivani Canales APRN as PCP - General (Family Medicine)    Chief Complaint:    Chief Complaint   Patient presents with   • Drug Overdose       Subjective:   Follow up for HOLLY and related issues.  Interval History:   Patient Complaints: none  Patient seen and examined this morning.  Events from last night noted.  Patient still intubated and sedated, increased urine output noted.  Patient's mother is in the room details discussed with her as well.    Objective:    Vital Signs  /76 (BP Location: Left arm, Patient  "Position: Lying)   Pulse 87   Temp 98.6 °F (37 °C) (Oral)   Resp 24   Ht 170.2 cm (67.01\")   Wt 77.9 kg (171 lb 12.8 oz)   SpO2 95%   BMI 26.90 kg/m²      No intake/output data recorded.    Intake/Output Summary (Last 24 hours) at 11/2/2019 0819  Last data filed at 11/2/2019 0556  Gross per 24 hour   Intake 4532 ml   Output 1800 ml   Net 2732 ml       Physical Exam:  General Appearance: Intubated, no acute distress,   HEENT: Oral mucosa dry, extra occular movements intact. Sclera clear.  Skin: Warm and dry  Neck: supple, no JVD, trachea midline  Lungs:Chest shape is normal. Breath sounds heard bilaterally equally. No crackles, No wheezing.   Heart: regular rate and rhythm. normal S1 and S2, no S3, no rub, peripheral pulses weak but palpable.  Abdomen: Obese, soft, non-tender,  present bowel sounds to auscultation  : no palpable bladder.  Extremities: Trace edema, no cyanosis or clubbing.   Neuro: grossly non focal.     Results Review:   Results from last 7 days   Lab Units 11/02/19  0421 11/01/19  1559 11/01/19  0411 10/31/19  0500 10/30/19  1957   SODIUM mmol/L 142 140 142 141 139   POTASSIUM mmol/L 3.9 4.4 5.6* 3.6 3.9   CHLORIDE mmol/L 114* 112* 113* 109* 99   CO2 mmol/L 22.6 21.4* 22.4 23.3 18.8*   BUN mg/dL 27* 30* 32* 19 18   CREATININE mg/dL 0.83 1.12 1.34* 0.98 1.09   CALCIUM mg/dL 8.2* 7.8* 7.7* 8.0* 9.3   ALBUMIN g/dL 2.00*  --   --  2.60* 3.60   BILIRUBIN mg/dL 0.3  --   --  0.4 0.2   ALK PHOS U/L 135*  --   --  100 140*   ALT (SGPT) U/L 17  --   --  32 39   AST (SGOT) U/L 21  --   --  38 50*   GLUCOSE mg/dL 128* 113* 101* 117* 315*     Estimated Creatinine Clearance: 123.8 mL/min (by C-G formula based on SCr of 0.83 mg/dL).  Results from last 7 days   Lab Units 10/31/19  0500   MAGNESIUM mg/dL 1.7   PHOSPHORUS mg/dL 2.2*         Results from last 7 days   Lab Units 11/02/19  0421 11/01/19  0411 10/31/19  0500 10/30/19  1957   WBC 10*3/mm3 10.38 16.36* 2.64* 21.80*   HEMOGLOBIN g/dL 9.1* 10.8* " 12.0* 12.3*   PLATELETS 10*3/mm3 235 308 267 460*         Brief Urine Lab Results  (Last result in the past 365 days)      Color   Clarity   Blood   Leuk Est   Nitrite   Protein   CREAT   Urine HCG        11/01/19 1522 Yellow Cloudy Negative Negative Negative 30 mg/dL (1+)             No results found for: UTPCR  Imaging Results (Last 24 Hours)     Procedure Component Value Units Date/Time    XR Abdomen KUB [[id number]] Collected:  11/01/19 1239     Updated:  11/01/19 1243    Narrative:       PROCEDURE: XR ABDOMEN KUB-     HISTORY: NG verification so can start tube feedings; J96.01-Acute  respiratory failure with hypoxia; J96.02-Acute respiratory failure with  hypercapnia; R79.89-Other specified abnormal findings of blood  chemistry; J69.0-Pneumonitis due to inhalation of food and vomit;  T50.904A-Poisoning by unspecified drugs, medicaments and biological  substances, undetermined, initial encounter; A41.9-Sepsis, unspecified  or     COMPARISON: None.     FINDINGS: An AP view of the abdomen and pelvis demonstrates a  nasogastric tube with the tip in the antrum of the stomach. The bowel  gas pattern is unremarkable with no evidence of obstruction.       Impression:       Nasogastric tube with the tip in the antrum of the stomach.           This report was finalized on 11/1/2019 12:41 PM by Chris Patricio M.D..    XR Chest 1 View [[id number]] Collected:  11/01/19 0836     Updated:  11/01/19 0839    Narrative:       PROCEDURE: XR CHEST 1 VW-     HISTORY: Resp Failure.; J96.01-Acute respiratory failure with hypoxia;  J96.02-Acute respiratory failure with hypercapnia; R79.89-Other  specified abnormal findings of blood chemistry; J69.0-Pneumonitis due to  inhalation of food and vomit; T50.904A-Poisoning by unspecified drugs,  medicaments and biological substances, undetermined, initial encounter;  A41.9-Sepsis, unspecified organism     COMPARISON: 10/31/2019.     FINDINGS: The support tubes and lines are appropriately  positioned. The  heart is normal in size. The mediastinum is unremarkable. There is  continued worsening of the patient's bilateral airspace disease. No  significant effusions are evident. There is no pneumothorax.  There are  no acute osseous abnormalities.       Impression:       Worsening bilateral airspace disease with the differential  including ARDS and pneumonia.     Continued followup is recommended.     This report was finalized on 11/1/2019 8:37 AM by Chris Patricio M.D..          chlorhexidine 15 mL Mouth/Throat Q12H   enoxaparin 40 mg Subcutaneous Q24H   famotidine 20 mg Intravenous Q12H   insulin regular 0-7 Units Subcutaneous Q6H   lactobacillus acidophilus 1 capsule Oral BID   multivitamin with iron-minerals 15 mL Oral Daily   piperacillin-tazobactam 3.375 g Intravenous Q8H   sodium chloride 10 mL Intravenous Q12H   sodium chloride 10 mL Intravenous Q12H   sodium chloride 10 mL Intravenous Q12H   sodium chloride 10 mL Intravenous Q12H       midazolam (VERSED) infusion 1 mg/hr Last Rate: 1 mg/hr (11/01/19 1838)   Pharmacy to Dose Zosyn     propofol 5-50 mcg/kg/min Last Rate: 35 mcg/kg/min (11/02/19 0449)   sodium chloride 100 mL/hr Last Rate: 150 mL/hr (11/02/19 0221)         Medication Review:   Current Facility-Administered Medications   Medication Dose Route Frequency Provider Last Rate Last Dose   • acetaminophen (TYLENOL) tablet 650 mg  650 mg Oral Q4H PRN Prasanna Segal MD        Or   • acetaminophen (TYLENOL) 160 MG/5ML solution 650 mg  650 mg Oral Q4H PRN Prasanna Segal MD   650 mg at 11/02/19 0002    Or   • acetaminophen (TYLENOL) suppository 650 mg  650 mg Rectal Q4H PRN Prasanna Segal MD       • chlorhexidine (PERIDEX) 0.12 % solution 15 mL  15 mL Mouth/Throat Q12H Prasanna Segal MD   15 mL at 11/01/19 2033   • dextrose (D50W) 25 g/ 50mL Intravenous Solution 25 g  25 g Intravenous Q15 Min PRN Prasanna Segal MD       • dextrose (GLUTOSE) oral gel 1 tube  1 tube Oral Q15 Min PRN Prasanna Segal  MD       • enoxaparin (LOVENOX) syringe 40 mg  40 mg Subcutaneous Q24H Prasanna Segal MD   40 mg at 11/02/19 0013   • famotidine (PEPCID) injection 20 mg  20 mg Intravenous Q12H Prasanna Segal MD   20 mg at 11/01/19 2032   • glucagon (human recombinant) (GLUCAGEN DIAGNOSTIC) injection 1 mg  1 mg Subcutaneous PRN Prasanna Segal MD       • insulin regular (humuLIN R,novoLIN R) injection 0-7 Units  0-7 Units Subcutaneous Q6H Prasanna Segal MD       • labetalol (NORMODYNE,TRANDATE) injection 10 mg  10 mg Intravenous Q4H PRN Prasanna Segal MD       • lactobacillus acidophilus (RISAQUAD) capsule 1 capsule  1 capsule Oral BID Prasanna Segal MD   1 capsule at 11/01/19 2032   • midazolam (VERSED) 0.5 mg/mL in sodium chloride 0.9 % 100 mL infusion  1 mg/hr Intravenous Titrated Prasanna Segal MD 2 mL/hr at 11/01/19 1838 1 mg/hr at 11/01/19 1838   • Morphine sulfate (PF) injection 4 mg  4 mg Intravenous Q2H PRN Prasanna Segal MD   4 mg at 10/30/19 2325   • multivitamin with iron-minerals liquid 15 mL  15 mL Oral Daily Prasanna Segal MD   15 mL at 11/01/19 0904   • ondansetron (ZOFRAN) injection 4 mg  4 mg Intravenous Q6H PRN Prasanna Segal MD       • Pharmacy to Dose Zosyn   Does not apply Continuous PRN Prasanna Segal MD       • piperacillin-tazobactam (ZOSYN) 3.375 g in iso-osmotic dextrose 50 ml (premix)  3.375 g Intravenous Q8H Prasanna Segal MD   3.375 g at 11/02/19 0331   • propofol (DIPRIVAN) infusion 10 mg/mL 100 mL  5-50 mcg/kg/min Intravenous Titrated Smooth Powers DO 15.08 mL/hr at 11/02/19 0449 35 mcg/kg/min at 11/02/19 0449   • sodium chloride 0.9 % flush 10 mL  10 mL Intravenous Q12H Prasanna Segal MD   10 mL at 11/01/19 2100   • sodium chloride 0.9 % flush 10 mL  10 mL Intravenous PRN Prasanna Segal MD       • sodium chloride 0.9 % flush 10 mL  10 mL Intravenous Q12H Bria Tamayo MD   10 mL at 11/01/19 2100   • sodium chloride 0.9 % flush 10 mL  10 mL Intravenous Q12H Bria Tamayo MD   10 mL at 11/01/19 2100   •  sodium chloride 0.9 % flush 10 mL  10 mL Intravenous Q12H Bria Tamayo MD   10 mL at 19   • sodium chloride 0.9 % flush 10 mL  10 mL Intravenous PRN Bria Tamayo MD       • sodium chloride 0.9 % flush 20 mL  20 mL Intravenous PRN Bria Tamayo MD       • sodium chloride 0.9 % infusion  100 mL/hr Intravenous Continuous Stephan Parker  mL/hr at 19 0221 150 mL/hr at 19 0221       Assessment/Plan:    11. Acute renal failure:  12. Hyperkalemia:  13. Acute respiratory failure with hypoxia and hypercapnia (CMS/HCC)  14. Accidental drug overdose  15. Toxic encephalopathy  16. Sepsis (CMS/HCC)  17. Aspiration pneumonia of both lower lobes due to vomit (CMS/HCC)  18. Tobacco dependence  19. Drug abuse (CMS/HCC)  20. Hyperglycemia    Plan:  · Continue with the current treatment plan.  · I will go ahead and decrease the IV fluids to 50 cc an hour blood pressure is much better.  · He has been off the pressors, likely extubation planned in the next 24 hours.  · Details were discussed with the  family in the room.    · Details were also discussed with the hospitalist service.   · Surveillance labs.  · Further recommendations will depend on clinical course of the patient during the current hospitalization.    · I also discussed the details with the nursing staff.  · Rest as ordered.    Alex Arora MD, FASN  19  8:19 AM    Dictated utilizing Dragon dictation.    Electronically signed by Alex Arora MD, EVARISTO at 19 1938     Stephan Parker DO at 19 0800                [facility]    Name:  Eddie Prieto   Age:  44 y.o.  Sex:  male  :  1975  MRN:  [id number]   Visit Number:  [id number]  Admission Date:  10/30/2019  Date Of Service:  19  Primary Care Physician:  Shivani Canales APRN     LOS: 3 days :  Patient Care Team:  Shivani Canales APRN as PCP - General (Family  Medicine):    Chief Complaint:      Patient seen in follow-up for septic shock and drug overdose    Subjective / Interval History:     Patient remains intubated and sedated in the ICU.  He is currently on Versed and propofol.  No pressors.  Sister is at bedside.  No acute events reported from nursing overnight.  Unable to obtain subjective history as patient is sedated.  Sister was updated on patient's condition.  Questions answered.    Review of Systems:     Unable to contribute    Vital Signs:    Temp:  [98.6 °F (37 °C)-100.5 °F (38.1 °C)] 98.6 °F (37 °C)  Heart Rate:  [] 87  Resp:  [24] 24  BP: ()/(50-87) 114/76  FiO2 (%):  [35 %-60 %] 35 %    Intake and output:    I/O last 3 completed shifts:  In: 6991 [I.V.:6031; Other:227; NG/GT:102; IV Piggyback:631]  Out: 1925 [Urine:1925]  No intake/output data recorded.    Physical Examination:    General Appearance:   Intubated and sedated no acute distress   Head:  Atraumatic and normocephalic, without obvious abnormality.   Eyes:          Pinpoint pupils, conjunctivae and sclerae normal, no Icterus. No pallor.    Neck: Supple, trachea midline, no thyromegaly, no carotid bruit.   Lungs:   Chest shape is normal. Breath sounds heard bilaterally equally.  No crackles or wheezing.    Heart:  Normal S1 and S2, no murmur. No JVD   Abdomen:   Normal bowel sounds, no masses, no organomegaly. Soft, non-tender, non-distended, no guarding, no rebound tenderness.   Extremities: no edema, no cyanosis, no clubbing.   Skin:  Multiple chronic sores and ulcers on lower extremities likely from IV drug use   Neurologic:  Intubated and sedated.  Unable to follow commands     Laboratory results:    Results from last 7 days   Lab Units 11/02/19  0421 11/01/19  1559 11/01/19  0411 10/31/19  0500 10/30/19  1957   SODIUM mmol/L 142 140 142 141 139   POTASSIUM mmol/L 3.9 4.4 5.6* 3.6 3.9   CHLORIDE mmol/L 114* 112* 113* 109* 99   CO2 mmol/L 22.6 21.4* 22.4 23.3 18.8*   BUN mg/dL 27*  30* 32* 19 18   CREATININE mg/dL 0.83 1.12 1.34* 0.98 1.09   CALCIUM mg/dL 8.2* 7.8* 7.7* 8.0* 9.3   BILIRUBIN mg/dL 0.3  --   --  0.4 0.2   ALK PHOS U/L 135*  --   --  100 140*   ALT (SGPT) U/L 17  --   --  32 39   AST (SGOT) U/L 21  --   --  38 50*   GLUCOSE mg/dL 128* 113* 101* 117* 315*     Results from last 7 days   Lab Units 11/02/19  0421 11/01/19  0411 10/31/19  0500   WBC 10*3/mm3 10.38 16.36* 2.64*   HEMOGLOBIN g/dL 9.1* 10.8* 12.0*   HEMATOCRIT % 30.6* 37.5 40.3   PLATELETS 10*3/mm3 235 308 267         Results from last 7 days   Lab Units 10/31/19  0500 10/30/19  1957   TROPONIN T ng/mL <0.010 <0.010     Results from last 7 days   Lab Units 10/31/19  1027 10/30/19  2137 10/30/19  2127   BLOODCX   --  No growth at 2 days No growth at 2 days   MRSA SCREEN CX  No Methicillin Resistant Staphylococcus aureus isolated  --   --        I have reviewed the patient's laboratory results.    Radiology results:    Imaging Results (Last 24 Hours)     Procedure Component Value Units Date/Time    XR Abdomen KUB [[id number]] Collected:  11/01/19 1239     Updated:  11/01/19 1243    Narrative:       PROCEDURE: XR ABDOMEN KUB-     HISTORY: NG verification so can start tube feedings; J96.01-Acute  respiratory failure with hypoxia; J96.02-Acute respiratory failure with  hypercapnia; R79.89-Other specified abnormal findings of blood  chemistry; J69.0-Pneumonitis due to inhalation of food and vomit;  T50.904A-Poisoning by unspecified drugs, medicaments and biological  substances, undetermined, initial encounter; A41.9-Sepsis, unspecified  or     COMPARISON: None.     FINDINGS: An AP view of the abdomen and pelvis demonstrates a  nasogastric tube with the tip in the antrum of the stomach. The bowel  gas pattern is unremarkable with no evidence of obstruction.       Impression:       Nasogastric tube with the tip in the antrum of the stomach.           This report was finalized on 11/1/2019 12:41 PM by Chris Patricio M.D..     XR Chest 1 View [[id number]] Collected:  11/01/19 0836     Updated:  11/01/19 0839    Narrative:       PROCEDURE: XR CHEST 1 VW-     HISTORY: Resp Failure.; J96.01-Acute respiratory failure with hypoxia;  J96.02-Acute respiratory failure with hypercapnia; R79.89-Other  specified abnormal findings of blood chemistry; J69.0-Pneumonitis due to  inhalation of food and vomit; T50.904A-Poisoning by unspecified drugs,  medicaments and biological substances, undetermined, initial encounter;  A41.9-Sepsis, unspecified organism     COMPARISON: 10/31/2019.     FINDINGS: The support tubes and lines are appropriately positioned. The  heart is normal in size. The mediastinum is unremarkable. There is  continued worsening of the patient's bilateral airspace disease. No  significant effusions are evident. There is no pneumothorax.  There are  no acute osseous abnormalities.       Impression:       Worsening bilateral airspace disease with the differential  including ARDS and pneumonia.     Continued followup is recommended.     This report was finalized on 11/1/2019 8:37 AM by Chris Patricio M.D..          I have reviewed the patient's radiology reports.    Medication Review:     I have reviewed the patients active and prn medications.       Acute respiratory failure with hypoxia and hypercapnia (CMS/HCC)    Accidental drug overdose    Toxic encephalopathy    Sepsis (CMS/HCC)    Aspiration pneumonia of both lower lobes due to vomit (CMS/HCC)    Tobacco dependence    Drug abuse (CMS/HCC)    Hyperglycemia      Assessment:    Acute toxic encephalopathy, present on admission.  Acute hypoxic and hypercapnic respiratory failure secondary to #2, present on admission.  Acute drug overdose with opiates and methamphetamine, present on admission.  Septic Shock secondary to pna, present on admission.  Bilateral lower lobe aspiration pneumonia, present on admission.  Acute kidney injury likely secondary to ATN from hypotension due to septic  shock  Hyperglycemia, likely secondary to diabetes, present on admission. - resolved  Hypertensive urgency, present on admission - resolved  Twitching of all 4 extremities, suspect anoxic encephalopathy.  Nicotine dependence.  Recreational drug abuse.  Multiple skin ulcers, present on admission.    Plan:    He remains intubated and sedated with propofol and Versed.  FiO2 has been able to be titrated down significantly to 35%.  Dr. Tamayo consulted and following.  Attempt weaning trial tomorrow.  MRSA surveillance screen was negative.  Have discontinued vancomycin.  We will continue with Zosyn.  Blood cultures have been negative at 48 hours.  Patient has remained off of vasopressors.  BP stable. Goal map greater than 65.  If pressors are needed will prefer levophed now that sinus tachycardia has resolved.  Creatinine has trended down to within normal limits at 0.83.  I suspected I think that this was secondary to ATN from hypotension and septic shock.  We will continue to monitor.  Will decrease maintenance fluids to rate of 100 mL/hour of normal saline.   Continue with tube feeds.    Stephan Parker DO  19  8:00 AM    Dictated utilizing Dragon dictation.      Electronically signed by Stephan Parker DO at 19 1152     Stephan Parker DO at 19 1116                [facility]    Name:  Eddie Prieto   Age:  44 y.o.  Sex:  male  :  1975  MRN:  [id number]   Visit Number:  [id number]  Admission Date:  10/30/2019  Date Of Service:  19  Primary Care Physician:  Shivani Canales APRN     LOS: 2 days :  Patient Care Team:  Shivani Canales APRN as PCP - General (Family Medicine):    Chief Complaint:      Patient seen today in follow-up for septic shock and drug overdose    Subjective / Interval History:     Patient remains intubated and sedated this morning.  Family was present at bedside.  No events reported from  nursing overnight.  Have been able to discontinue phenylephrine for blood pressure support.  No subjective history was able to be obtained due to sedation.  Leg was updated and questions answered.    Review of Systems:     Unable to obtain    Vital Signs:    Temp:  [98.4 °F (36.9 °C)-99.7 °F (37.6 °C)] 98.8 °F (37.1 °C)  Heart Rate:  [89-97] 94  Resp:  [20-24] 24  BP: ()/(51-75) 118/65  FiO2 (%):  [60 %-80 %] 60 %    Intake and output:    I/O last 3 completed shifts:  In: 7943 [I.V.:6062; Other:200; IV Piggyback:1681]  Out: 2025 [Urine:2025]  No intake/output data recorded.    Physical Examination:    General Appearance:   Intubated and sedated   Head:  Atraumatic and normocephalic, without obvious abnormality.   Eyes:          Pinpoint pupils, conjunctivae and sclerae normal, no Icterus. No pallor.    Neck: Supple, trachea midline,    Lungs:   Chest shape is normal. Breath sounds heard bilaterally equally.  No crackles or wheezing.    Heart:  Normal S1 and S2, no murmur,  No JVD   Abdomen:   Normal bowel sounds, Soft, non-tender, non-distended,    Extremities: no edema, no cyanosis, no clubbing.   Skin: No bleeding, multiple skin wounds from IV drug use.   Neurologic:  Sedated and unable to follow commands.     Laboratory results:    Results from last 7 days   Lab Units 11/01/19  0411 10/31/19  0500 10/30/19  1957   SODIUM mmol/L 142 141 139   POTASSIUM mmol/L 5.6* 3.6 3.9   CHLORIDE mmol/L 113* 109* 99   CO2 mmol/L 22.4 23.3 18.8*   BUN mg/dL 32* 19 18   CREATININE mg/dL 1.34* 0.98 1.09   CALCIUM mg/dL 7.7* 8.0* 9.3   BILIRUBIN mg/dL  --  0.4 0.2   ALK PHOS U/L  --  100 140*   ALT (SGPT) U/L  --  32 39   AST (SGOT) U/L  --  38 50*   GLUCOSE mg/dL 101* 117* 315*     Results from last 7 days   Lab Units 11/01/19  0411 10/31/19  0500 10/30/19  1957   WBC 10*3/mm3 16.36* 2.64* 21.80*   HEMOGLOBIN g/dL 10.8* 12.0* 12.3*   HEMATOCRIT % 37.5 40.3 42.3   PLATELETS 10*3/mm3 308 267 460*         Results from last 7  days   Lab Units 10/31/19  0500 10/30/19  1957   TROPONIN T ng/mL <0.010 <0.010     Results from last 7 days   Lab Units 10/30/19  2137 10/30/19  2127   BLOODCX  No growth at 24 hours No growth at 24 hours       I have reviewed the patient's laboratory results.    Radiology results:    Imaging Results (Last 24 Hours)     Procedure Component Value Units Date/Time    XR Chest 1 View [[id number]] Collected:  11/01/19 0836     Updated:  11/01/19 0839    Narrative:       PROCEDURE: XR CHEST 1 VW-     HISTORY: Resp Failure.; J96.01-Acute respiratory failure with hypoxia;  J96.02-Acute respiratory failure with hypercapnia; R79.89-Other  specified abnormal findings of blood chemistry; J69.0-Pneumonitis due to  inhalation of food and vomit; T50.904A-Poisoning by unspecified drugs,  medicaments and biological substances, undetermined, initial encounter;  A41.9-Sepsis, unspecified organism     COMPARISON: 10/31/2019.     FINDINGS: The support tubes and lines are appropriately positioned. The  heart is normal in size. The mediastinum is unremarkable. There is  continued worsening of the patient's bilateral airspace disease. No  significant effusions are evident. There is no pneumothorax.  There are  no acute osseous abnormalities.       Impression:       Worsening bilateral airspace disease with the differential  including ARDS and pneumonia.     Continued followup is recommended.     This report was finalized on 11/1/2019 8:37 AM by Chris Patricio M.D..          I have reviewed the patient's radiology reports.    Medication Review:     I have reviewed the patients active and prn medications.       Acute respiratory failure with hypoxia and hypercapnia (CMS/HCC)    Accidental drug overdose    Toxic encephalopathy    Sepsis (CMS/HCC)    Aspiration pneumonia of both lower lobes due to vomit (CMS/HCC)    Tobacco dependence    Drug abuse (CMS/HCC)    Hyperglycemia      Assessment:    Acute toxic encephalopathy, present on  admission.  Acute hypoxic and hypercapnic respiratory failure secondary to #2, present on admission.  Acute drug overdose with opiates and methamphetamine, present on admission.  Septic Shock secondary to pna, present on admission.  Bilateral lower lobe aspiration pneumonia, present on admission.  Acute kidney injury likely secondary to ATN from hypotension due to septic shock  Hyperglycemia, likely secondary to diabetes, present on admission. - resolved  Hypertensive urgency, present on admission - resolved  Twitching of all 4 extremities, suspect anoxic encephalopathy.  Nicotine dependence.  Recreational drug abuse.  Multiple skin ulcers, present on admission.    Plan:    He remains intubated and sedated with propofol and Versed.  FiO2 of 60%.  Chest x-ray done this morning showed worsening bilateral airspace disease.  Will not attempt weaning trial today.  Dr. Tamayo consulted and appreciate his recommendations.  Was able to successfully titrate off of phenylephrine.  Blood pressure has remained stable.  Goal map greater than 65.  If pressors are needed will prefer levophed now that sinus tachycardia has resolved.  Renal function significantly worsened today.  Creatinine yesterday was 0.98 and increased to 1.34 today.  SPECT this is all secondary to hypotension and septic shock now causing ATN.  Will continue with maintenance fluids at current rate of 150 mL's per hour normal saline.  Will repeat BMP in the morning.  Dr. Arora has been consulted and appreciate his recommendations.  Continue with vancomycin and Zosyn.  MRSA screen still pending.  Blood cultures are no growth at 24 hours.  NG tube placed and awaiting KUB for confirmation.  Will start tube feeds today.  Family present this morning and have been updated regarding patient's critical condition.      Stephan Parker DO  11/01/19  11:16 AM    Dictated utilizing Dragon dictation.      Electronically signed by Stephan Parker DO at  "19 1150     Bria Tamayo MD at 19 0930            CC: Acute Respiratory Failure.     S: Intubated and sedated.     ROS: Could not be obtained as the patient is on mechanical ventilator.    O:Vital signs reviewed.  FiO2: 60 %. CVP Line. Day # 2. Vent Day # 2  /65   Pulse 90   Temp 98.8 °F (37.1 °C) (Oral)   Resp 24   Ht 170.2 cm (67.01\")   Wt 73 kg (161 lb)   SpO2 99%   BMI 25.21 kg/m²      Temp (24hrs), Av.1 °F (37.3 °C), Min:98.4 °F (36.9 °C), Max:99.7 °F (37.6 °C)      I & Os reviewed.   Intake/Output       10/31/19 0700 - 19 0659    Intake (ml) 5352    Output (ml) 525    Net (ml) 4827    Last Weight  73 kg (161 lb)          General/Constitutional: Intubated. Sedated  Eyes: PERRL.   Neck: Supple without JVD. No obvious masses noted. Left IJ CVP line in place.   Cardiovascular: S1 + S2. Regular.    Lungs/Respiratory: Transmitted Breath sounds noted. No wheezing heard. Right > Left crackles noted.  GI/Abdomen: Soft. Bowel sounds positive. No organomegaly noted.  Musculoskeletal/Extremities: Trace edema noted. Gait could not be assessed at this time, as the patient was laying in bed.   Neurologic: Sedated. Detailed exam couldn't be performed due to sedation. As per RN, he became agitated on sedation vacation.    Psych: Could not be performed as he is currently sedated.   Skin: Appeared with diffuse scattered ulcerations over upper and lower extremities.     Labs: Reviewed.   Results from last 7 days   Lab Units 19  0411 10/31/19  0500 10/30/19  1957   SODIUM mmol/L 142 141 139   POTASSIUM mmol/L 5.6* 3.6 3.9   CHLORIDE mmol/L 113* 109* 99   CO2 mmol/L 22.4 23.3 18.8*   BUN mg/dL 32* 19 18   CREATININE mg/dL 1.34* 0.98 1.09   CALCIUM mg/dL 7.7* 8.0* 9.3   BILIRUBIN mg/dL  --  0.4 0.2   ALK PHOS U/L  --  100 140*   ALT (SGPT) U/L  --  32 39   AST (SGOT) U/L  --  38 50*   GLUCOSE mg/dL 101* 117* 315*       Results from last 7 days   Lab Units 10/31/19  0500   MAGNESIUM " mg/dL 1.7   PHOSPHORUS mg/dL 2.2*       Results from last 7 days   Lab Units 11/01/19  0411 10/31/19  0500 10/30/19  1957   WBC 10*3/mm3 16.36* 2.64* 21.80*   HEMOGLOBIN g/dL 10.8* 12.0* 12.3*   HEMATOCRIT % 37.5 40.3 42.3   PLATELETS 10*3/mm3 308 267 460*             Lab Results   Component Value Date    PROCALCITO 8.30 (H) 10/30/2019       Lab Results   Component Value Date    PROBNP 385.2 10/30/2019         midazolam (VERSED) infusion 1 mg/hr Last Rate: Stopped (11/01/19 0729)   Pharmacy to dose vancomycin     Pharmacy to Dose Zosyn     phenylephrine 0.5-3 mcg/kg/min Last Rate: 0.5 mcg/kg/min (11/01/19 0810)   propofol 5-50 mcg/kg/min Last Rate: 50 mcg/kg/min (11/01/19 0810)   sodium chloride 150 mL/hr Last Rate: 150 mL/hr (11/01/19 0852)       ABG: Reviewed.  Lab Results   Component Value Date    PHART 7.228 (C) 11/01/2019    GEH5SIT 53.0 (H) 11/01/2019    PO2ART 160.0 (H) 11/01/2019    HGBBG 11.6 (L) 11/01/2019    C4STAHBU 99.2 11/01/2019    CARBOXYHGB <0.6 11/01/2019         CXRay: Latest imaging study was reviewed personally.   Imaging Results (Last 24 Hours)     Procedure Component Value Units Date/Time    XR Chest 1 View [[id number]] Collected:  11/01/19 0836     Updated:  11/01/19 0839    Narrative:       PROCEDURE: XR CHEST 1 VW-     HISTORY: Resp Failure.; J96.01-Acute respiratory failure with hypoxia;  J96.02-Acute respiratory failure with hypercapnia; R79.89-Other  specified abnormal findings of blood chemistry; J69.0-Pneumonitis due to  inhalation of food and vomit; T50.904A-Poisoning by unspecified drugs,  medicaments and biological substances, undetermined, initial encounter;  A41.9-Sepsis, unspecified organism     COMPARISON: 10/31/2019.     FINDINGS: The support tubes and lines are appropriately positioned. The  heart is normal in size. The mediastinum is unremarkable. There is  continued worsening of the patient's bilateral airspace disease. No  significant effusions are evident. There is no  pneumothorax.  There are  no acute osseous abnormalities.       Impression:       Worsening bilateral airspace disease with the differential  including ARDS and pneumonia.     Continued followup is recommended.     This report was finalized on 11/1/2019 8:37 AM by Chris Patricio M.D..    XR Chest 1 View [[id number]] Collected:  10/31/19 1043     Updated:  10/31/19 1047    Narrative:       PROCEDURE: XR CHEST 1 VW-     HISTORY: Central line placement; J96.01-Acute respiratory failure with  hypoxia; J96.02-Acute respiratory failure with hypercapnia; R79.89-Other  specified abnormal findings of blood chemistry; J69.0-Pneumonitis due to  inhalation of food and vomit; T50.904A-Poisoning by unspecified drugs,  medicaments and biological substances, undetermined, initial encounter;  A41.9-Sepsis, unspecified organism     COMPARISON: 10/30/2019.     FINDINGS: There has been interval placement of a left internal jugular  central venous catheter with the tip in the SVC. Endotracheal and  nasogastric tubes are well-positioned. The heart is normal in size. The  mediastinum is unremarkable. There has been interval worsening of the  patient's perihilar airspace disease. There is no pneumothorax.  There  are no acute osseous abnormalities.       Impression:       1. Placement of a left internal jugular central venous catheter with no  evidence of pneumothorax.  2. Worsening bilateral airspace disease.     Continued followup is recommended.     This report was finalized on 10/31/2019 10:45 AM by Chris Patricio M.D..            Assessment & Recommendations/Plan:   1.  Acute Respiratory Failure  Continue mechanical ventilation  Adjustments were made due to continued respiratory acidosis  We will repeat chest x-ray and ABG in the morning  Due to extensive pulmonary opacities and FiO2 of around 60%, we will continue mechanical ventilation for another 24-48 hours.  I recommended continued sedation vacation attempts.  He may  need to be started on other infusions, such as fentanyl, given his history of heroin and other opiate overdose.  Extubation may be somewhat challenging in this patient because of drug overdose.    2.  Left sided ?aspiration? Pneumonia.   Worsening bilateral airspace disease likely from extensive pneumonia  ARDS remains in the differential diagnosis although his oxygenation has improved significantly which goes against this possibility.  We will repeat procalcitonin level on 3 November.    3.  Respiratory acidosis  Improved, after adjustments were made to the ventilator.    4.  Septic Shock.  Currently off pressors.    5.  Drug overdose  Toxicology screen was positive for multiple agents.    6.  Acute kidney injury  Likely from septic shock.  Urine output extremely poor over the past 24 hours.  Will advise close follow-up.  May consider nephrology consultation    7.  Nutrition  Will start Jevity.     Critical Care time spent in direct patient care: 40 minutes including high complexity decision making to assess, manipulate, and support vital organ system failure in this individual who has impairment of one or more vital organ systems such that there is a high probability of imminent or life threatening deterioration in the patient’s condition. This time excludes other billable procedures. Time does include preparation of documents, review of old records, and direct bedside care.    We have reviewed patient's current orders and changes, if any, have been suggested to primary care team. Plan was also discussed with nursing staff, as necessary.     We have updated the admitting attending and nursing staff, as appropriate, on the patient's current status and plan. I will be going off shift tonight and will be unavailable.     This document was electronically signed by Bria Tamayo MD on 11/01/19 at 9:30 AM      Dictated utilizing Dragon dictation.        Electronically signed by Bria Tamayo MD at 11/01/19  1111

## 2019-11-08 NOTE — PLAN OF CARE
Problem: Skin Injury Risk (Adult)  Intervention: Promote/Optimize Nutrition   11/08/19 0800   Nutrition Interventions   Oral Nutrition Promotion nutritional therapy counseling provided;physical activity promoted;referred to outpatient services;rest periods promoted;safe use of adaptive equipment encouraged     Intervention: Prevent/Manage Excess Moisture   11/08/19 1300 11/08/19 1600   Hygiene Care   Perineal Care --  absorbent pad changed;diaper changed;perineum cleansed   Bathing/Skin Care back care;bath, chlorhexidine;bath, complete;dressed/undressed;foot care;incontinence care;linen changed;shampoo --    Skin Interventions   Skin Protection --  adhesive use limited;drying agents applied;electrode sites changed;incontinence pads utilized;pectin skin barriers applied;protective footwear used;pulse oximeter probe site changed;sacral silicone foam dressing in place;skin sealant/moisture barrier applied;skin-to-device areas padded;skin-to-skin areas padded;transparent dressing maintained;tubing/devices free from skin contact     Intervention: Maintain Head of Bed Elevation Less Than 30 Degrees as Tolerated   11/08/19 1700   Positioning   Head of Bed (HOB) HOB elevated     Intervention: Prevent/Minimize Shear/Friction Injuries   11/08/19 1600 11/08/19 1800   Positioning   Positioning/Transfer Devices --  pillows   Skin Interventions   Pressure Reduction Devices foam padding utilized;heel offloading device utilized;positioning supports utilized;pressure-redistributing mattress utilized;alternating pressure pump utilized;specialty bed utilized --      Intervention: Prevent or Minimize Pressure   11/08/19 1600 11/08/19 1800   Positioning   Body Position --  turned;legs elevated;upper extremity elevated, right;weight shift assistance provided;other (see comments);upper extremity elevated, left   Skin Interventions   Pressure Reduction Techniques frequent weight shift encouraged;heels elevated off bed;positioned off  wounds;pressure points protected;rest period provided between sit times;sit time limited to 1 hour;weight shift assistance provided --        Goal: Skin Health and Integrity  Outcome: Ongoing (interventions implemented as appropriate)   11/08/19 1800   Skin Injury Risk (Adult)   Skin Health and Integrity unable to achieve outcome       Problem: Pneumonia (Adult)  Intervention: Maximize Oxygenation/Ventilation/Perfusion   11/08/19 0800 11/08/19 1700   Positioning   Head of Bed (HOB) --  HOB elevated   Respiratory Interventions   Airway/Ventilation Management airway patency maintained;calming measures promoted;humidification applied;pulmonary hygiene promoted --      Intervention: Prevent/Manage Infection Progression   11/08/19 1600 11/08/19 1800   Prevent/Manage Colorectal Surgical Infection   Fever Reduction/Comfort Measures --  lightweight clothing;lightweight bedding   Promote Perineal Hygiene/Elimination Safety   Isolation Precautions --  contact precautions initiated;contact precautions maintained   Safety Management   Infection Prevention environmental surveillance performed;equipment surfaces disinfected;personal protective equipment utilized;rest/sleep promoted;single patient room provided --      Intervention: Monitor/Manage Fluid Electrolyte Balance   11/08/19 0800   Nutrition Interventions   Fluid/Electrolyte Management intravenous fluids adjusted       Goal: Signs and Symptoms of Listed Potential Problems Will be Absent, Minimized or Managed (Pneumonia)  Outcome: Ongoing (interventions implemented as appropriate)   11/08/19 1800   Goal/Outcome Evaluation   Problems Assessed (Pneumonia) all   Problems Present (Pneumonia) respiratory compromise;infection progression       Problem: Fall Risk (Adult)  Intervention: Monitor/Assist with Self Care   11/08/19 1700 11/08/19 1800   Daily Care Interventions   Self-Care Promotion --  independence encouraged;BADL personal objects within reach;safe use of adaptive  equipment encouraged;meal setup provided   Activity   Activity Assistance Provided assistance, 2 people --      Intervention: Reduce Risk/Promote Restraint Free Environment   19 1600 19 1700   Safety Management   Environmental Safety Modification --  assistive device/personal items within reach;clutter free environment maintained;lighting adjusted;room organization consistent   Safety Promotion/Fall Prevention --  activity supervised;fall prevention program maintained;nonskid shoes/slippers when out of bed;safety round/check completed   Prevent  Drop/Fall   Safety/Security Measures bed alarm set --      Intervention: Review Medications/Identify Contributors to Fall Risk   19 1800   Safety Management   Medication Review/Management medications reviewed;dosing adjusted;high risk medications identified;provider consulted       Goal: Identify Related Risk Factors and Signs and Symptoms  Outcome: Ongoing (interventions implemented as appropriate)   19 1800   Fall Risk (Adult)   Related Risk Factors (Fall Risk) fatigue/slow reaction;gait/mobility problems;history of falls;polypharmacy;sensory deficits;sleep pattern alteration;environment unfamiliar   Signs and Symptoms (Fall Risk) presence of risk factors

## 2019-11-08 NOTE — PROGRESS NOTES
"  CC: Acute Respiratory Failure.     S: Extubated on 2019.  Continues to be sleepy but was definitely more awake.  Was able to follow some commands.  Open eyes to request.  Was not able to verbalize this symptoms today.    ROS: Could not be obtained as the patient is somewhat sleepy.    O:Vital signs reviewed.  CVP Line. Day # 9.   /88   Pulse 99   Temp 98.1 °F (36.7 °C) (Axillary)   Resp 28   Ht 170.2 cm (67.01\")   Wt 71.9 kg (158 lb 9.6 oz)   SpO2 91%   BMI 24.83 kg/m²     Temp (24hrs), Av °F (37.2 °C), Min:98.1 °F (36.7 °C), Max:101.1 °F (38.4 °C)      I & Os reviewed.   Intake/Output       19 0700 - 19 0659    Intake (ml) 1047    Output (ml) 2700    Net (ml) -1653    Last Weight  71.9 kg (158 lb 9.6 oz)          General/Constitutional: Appears in mild distress.  Eyes: PERRL.   Neck: Supple without JVD. No obvious masses noted. Left IJ CVP line in place.   Cardiovascular: S1 + S2. Regular.    Lungs/Respiratory: Coarse air entry bilaterally with bilateral, right greater than left crackles noted.  Respiratory effort was somewhat labored with mild tachypnea noted.  GI/Abdomen: Soft. Bowel sounds positive. No organomegaly noted.  Musculoskeletal/Extremities: Trace edema noted. Gait could not be assessed at this time, as the patient was laying in bed.   Neurologic: Awoke to request and painful stimulus.  Followed commands and moving upper and lower extremities.  Also open eyes to request.  Psych: Appears occasionally agitated, as per the nursing staff.    Skin: Diffuse scattered ulcerations over upper and lower extremities seen.     Labs: Reviewed.   Results from last 7 days   Lab Units 19  04219  04019  04019  042   SODIUM mmol/L 144 143 146*   < > 142   POTASSIUM mmol/L 3.5 3.5 3.8   < > 3.9   CHLORIDE mmol/L 103 103 104   < > 114*   CO2 mmol/L 28.7 30.7* 32.1*   < > 22.6   BUN mg/dL 21* 31* 31*   < > 27*   CREATININE mg/dL 0.57* 0.85 0.95   < > 0.83 "   CALCIUM mg/dL 8.9 8.5* 8.3*   < > 8.2*   BILIRUBIN mg/dL 0.7 0.7  --   --  0.3   ALK PHOS U/L 315* 253*  --   --  135*   ALT (SGPT) U/L 25 17  --   --  17   AST (SGOT) U/L 59* 38  --   --  21   GLUCOSE mg/dL 124* 128* 129*   < > 128*    < > = values in this interval not displayed.             Results from last 7 days   Lab Units 11/08/19  0420 11/07/19  0404 11/06/19  0407 11/05/19  0439 11/04/19  0404   WBC 10*3/mm3 21.65* 10.33 8.91 9.84 11.00*   NEUTROPHIL % % 84.5* 69.7 57.7 64.0 73.0   HEMOGLOBIN g/dL 10.2* 8.3* 8.1* 8.6* 9.1*   HEMATOCRIT % 32.3* 27.4* 26.8* 27.4* 29.7*   PLATELETS 10*3/mm3 452* 240 219 231 241             Lab Results   Component Value Date    PROCALCITO 1.16 (H) 11/08/2019    PROCALCITO 2.72 (H) 11/05/2019    PROCALCITO 7.46 (H) 11/03/2019       Lab Results   Component Value Date    PROBNP 385.2 10/30/2019         Pharmacy to Dose Zosyn          Micro: As of November 8, 2019   Lab Results   Component Value Date    RESPCX No growth 11/06/2019    RESPCX Scant growth (1+) Acinetobacter baumannii (A) 11/03/2019    RESPCX Light growth (2+) Candida albicans (A) 11/03/2019    RESPCX Scant growth (1+) Citrobacter koseri (A) 11/03/2019    RESPCX No Normal Respiratory Dunia (A) 11/03/2019     Lab Results   Component Value Date    BLOODCX No growth at 24 hours 11/07/2019    BLOODCX No growth at 24 hours 11/07/2019       Lab Results   Component Value Date    MRSACX  10/31/2019     No Methicillin Resistant Staphylococcus aureus isolated       ABG: Reviewed.  Lab Results   Component Value Date    PHART 7.382 11/07/2019    WNT9OQT 50.5 (H) 11/07/2019    PO2ART 77.4 11/07/2019    HGBBG 10.8 (L) 11/07/2019    V6ESVQLZ 95.6 11/07/2019    CARBOXYHGB 0.9 11/07/2019       CXRay: Latest imaging study was reviewed personally.   Imaging Results (Last 24 Hours)     Procedure Component Value Units Date/Time    XR Chest 1 View [[id number]] Updated:  11/08/19 0621    US Abdomen Complete [[id number]] Resulted:  11/08/19  0829     Updated:  11/07/19 1707    XR Chest 1 View [[id number]] Collected:  11/07/19 0927     Updated:  11/07/19 0932    Narrative:       PORTABLE CHEST     INDICATION: Respiratory failure. Follow up infiltrates.     FINDINGS: Single frontal portable chest, compared with 11/06/2019. EKG  leads overlie the chest. Support tubes and lines remain in place. No  pneumothorax. Bilateral parenchymal infiltrates are again identified.  These appear similar to subtly improved. No other interval change.       Impression:       Bilateral infiltrates are similar to subtly improved.  Continued follow-up recommended.     This report was finalized on 11/7/2019 9:30 AM by Alejandro Stewart MD.          Assessment & Recommendations/Plan:   1.  Acute Respiratory Failure  Status post bronchoscopy.  Extubated on 11/7/2019.  Continue high-dose oxygen.  Chest x-ray remains significantly abnormal.  We will repeat x-ray in the morning as well.    2.  Left sided ?aspiration? Pneumonia.   Procalcitonin level decreasing.    Will repeat procalcitonin level in 48 hours or so.  Sputum culture showing Acinetobacter and Citrobacter.  It appears that Acinetobacter was intermediate in sensitivity to ceftriaxone.  This was ascertained after discussion with the laboratory.  Continue ceftriaxone, for 1 more day.  Cultures were sent from bronchoscopy.  Negative thus far.  Vancomycin discontinued.    3.  Drug overdose.  Toxicology screen was positive for multiple agents.  We will increase morphine as needed.  We will also add Versed as needed, as withdrawal from any of the agents remains a real possibility in this patient.    4.  Anemia.  Was likely dilutional.   Improved with diuresis.    5.  Nutrition.  Will order speech and swallow evaluation    6.  Continued fever.  Currently on antibiotics.  Blood Cultures sent on 11/6/19.   Apart from initial respiratory cultures revealing abnormality, no cultures have revealed any further positive results.    7.   Hypertension  Will start the patient on Lopressor IV.    Once he is able to tolerate oral diet, we will switch Lopressor to p.o.    We have reviewed patient's current orders and changes, if any, have been suggested to primary care team. Plan was also discussed with nursing staff, as necessary.     This document was electronically signed by Bria Tamayo MD on 11/08/19 at 7:27 AM      Dictated utilizing Dragon dictation.

## 2019-11-08 NOTE — PROGRESS NOTES
[facility]    Name:  Eddie Prieto   Age:  44 y.o.  Sex:  male  :  1975  MRN:  [id number]   Visit Number:  [id number]  Admission Date:  10/30/2019  Date Of Service:  19  Primary Care Physician:  Shivani Canales APRN     LOS: 9 days :  Patient Care Team:  Shivani Canales APRN as PCP - General (Family Medicine):    Chief Complaint:      Follow-up on respiratory failure, drug overdose    Subjective / Interval History:     Patient sitting up in bed today with aerosolized facemask on.  He is arousable, does tell me he is hungry.  Otherwise is somnolent.  Has had some periods of agitation per nursing.    Prior work-up:  44-year-old male with history of drug abuse was brought to the emergency room by EMS after they were called by someone who was driving the patient around stating that the patient became unresponsive.  The history is obtained from the emergency room physician and the medical record.  The patient was given 2 mg of IV Narcan by EMS with some improvement in his mental status but he was agitated and confused.  When he came to the emergency room he was ashen looking, diaphoretic and cyanotic with minimal respiratory effort.  Patient was given 2 more milligram of Narcan while getting Ambu bag ventilation with minimal response.  His oxygen saturation was 36% in the emergency room and he was subsequently intubated in the emergency room.  Patient required pressors on day of admission due to hypotension.  He was significantly tachycardic and was started on phenylephrine.  This was able to be titrated down over the following day his sinus tachycardia resolved and blood pressure stabilized.  Patient started on broad-spectrum antibiotic's with vancomycin and Zosyn.  Cultures obtained.     Patient has been on mechanical ventilation since 10/30/2019.  He has had multiple attempts to extubate, with failure.  He is underwent bronchoscopy on  11/6/2019 with removal of significant mucus/secretions.  He is having intermittent fevers.  Has been on vancomycin and Zosyn, then Zosyn alone.  He has had repeat blood cultures.  Urology switched to Rocephin on 11/7/2019.  He was successfully extubated that evening.    Review of Systems:     General ROS: Patient denies any fevers, chills or loss of consciousness.  Respiratory ROS: Admits to cough and shortness of breath  Cardiovascular ROS: Denies chest pain or palpitations. No history of exertional chest pain.  Gastrointestinal ROS: Denies nausea and vomiting. Denies any abdominal pain. No diarrhea.  Neurological ROS: Denies any focal weakness. No loss of consciousness. Denies any numbness.  Dermatological ROS: Denies any redness or pruritis.    Vital Signs:    Temp:  [98.1 °F (36.7 °C)-98.7 °F (37.1 °C)] 98.7 °F (37.1 °C)  Heart Rate:  [] 98  Resp:  [20-41] 26  BP: (144-182)/() 176/100    Intake and output:    I/O last 3 completed shifts:  In: 3577 [I.V.:2537; Other:300; NG/GT:740]  Out: 4845 [Urine:4845]  No intake/output data recorded.    Physical Examination:    General Appearance:  Alert and somewhat cooperative, not in any acute distress.   Head:  Atraumatic and normocephalic, without obvious abnormality.   Eyes:          PERRLA, conjunctivae and sclerae normal, no Icterus. No pallor. Extraocular movements are within normal limits.   Neck: Supple, trachea midline, no thyromegaly.   Lungs:     Crackles noted.  Somewhat decreased breath sounds.   Heart:  Normal S1 and S2, no murmur, no gallop, no rub. No JVD   Abdomen:   Normal bowel sounds, no masses, no organomegaly. Soft, non-tender, non-distended, no guarding, no rebound tenderness.   Extremities: Moves all extremities well, trace edema, no cyanosis, no clubbing.   Skin: No bleeding, bruising or rash.   Neurologic:  Is awake and alert.  Still confused.  Able to follow some commands in regards to extremity movement.  Opens eyes to voice.      Laboratory results:    Results from last 7 days   Lab Units 11/08/19  0420 11/07/19  0404 11/06/19  0407  11/02/19  0421   SODIUM mmol/L 144 143 146*   < > 142   POTASSIUM mmol/L 3.5 3.5 3.8   < > 3.9   CHLORIDE mmol/L 103 103 104   < > 114*   CO2 mmol/L 28.7 30.7* 32.1*   < > 22.6   BUN mg/dL 21* 31* 31*   < > 27*   CREATININE mg/dL 0.57* 0.85 0.95   < > 0.83   CALCIUM mg/dL 8.9 8.5* 8.3*   < > 8.2*   BILIRUBIN mg/dL 0.7 0.7  --   --  0.3   ALK PHOS U/L 315* 253*  --   --  135*   ALT (SGPT) U/L 25 17  --   --  17   AST (SGOT) U/L 59* 38  --   --  21   GLUCOSE mg/dL 124* 128* 129*   < > 128*    < > = values in this interval not displayed.     Results from last 7 days   Lab Units 11/08/19  0420 11/07/19  0404 11/06/19  0407   WBC 10*3/mm3 21.65* 10.33 8.91   HEMOGLOBIN g/dL 10.2* 8.3* 8.1*   HEMATOCRIT % 32.3* 27.4* 26.8*   PLATELETS 10*3/mm3 452* 240 219             Results from last 7 days   Lab Units 11/07/19  0518 11/04/19  1135 11/04/19  1130   BLOODCX  No growth at 24 hours  No growth at 24 hours No growth at 4 days No growth at 4 days       I have reviewed the patient's laboratory results.    Radiology results:    Imaging Results (Last 24 Hours)     Procedure Component Value Units Date/Time    XR Chest 1 View [[id number]] Collected:  11/08/19 0936     Updated:  11/08/19 0946    Narrative:       PORTABLE CHEST     INDICATION: Respiratory failure.     FINDINGS: Single view chest, compared with 11/07/2019. Interval  extubation and removal of nasogastric tube. Left internal jugular venous  catheter remains in place. EKG leads overly the chest. Exam is degraded  by patient motion related artifact. Allowing for this, no obvious  pneumothorax. Bilateral infiltrates are similar to previous.       Impression:       Interval extubation but no other definite change in  bilateral infiltrates. Continued follow-up recommended.     This report was finalized on 11/8/2019 9:44 AM by Alejandro Stewart MD.    US Abdomen Complete  [[id number]] Collected:  11/08/19 0837     Updated:  11/08/19 0840    Narrative:       PROCEDURE: US ABDOMEN COMPLETE-     HISTORY: fever; J96.01-Acute respiratory failure with hypoxia;  J96.02-Acute respiratory failure with hypercapnia; R79.89-Other  specified abnormal findings of blood chemistry; J69.0-Pneumonitis due to  inhalation of food and vomit; T50.904A-Poisoning by unspecified drugs,  medicaments and biological substances, undetermined, initial encounter;  A41.9-Sepsis, unspecified organism     FINDINGS: The gallbladder is empty without stones or sludge.  No  gallbladder wall thickening or pericholecystic fluid.  No biliary ductal  dilatation.  Visualized portions of the hepatic and pancreatic  parenchyma appear unremarkable.  Some portions are mildly obscured by  bowel gas. The right kidney measures 11.0 cm and the left kidney  measures 11.1 cm.  No hydronephrosis, cyst or solid renal mass  identified.  Spleen is not enlarged and is homogeneous.  Aorta is normal  in caliber.  Visualized portion of the inferior vena cava is  unremarkable.  No free fluid.       Impression:       No acute process identified in the abdomen.     This report was finalized on 11/8/2019 8:38 AM by Alejandro Stewart MD.          I have reviewed the patient's radiology reports.    Medication Review:     I have reviewed the patients active and prn medications.       Acute respiratory failure with hypoxia and hypercapnia (CMS/HCC)    Accidental drug overdose    Toxic encephalopathy    Sepsis (CMS/HCC)    Aspiration pneumonia of both lower lobes due to vomit (CMS/HCC)    Tobacco dependence    Drug abuse (CMS/HCC)    Hyperglycemia      Assessment:    Acute toxic encephalopathy, present on admission.  Acute hypoxic and hypercapnic respiratory failure secondary to #2, present on admission.  Acute drug overdose with opiates and methamphetamine, present on admission.  Septic Shock secondary to pna, present on admission.  Bilateral lower lobe  aspiration pneumonia, present on admission.  Acute kidney injury likely secondary to ATN from hypotension due to septic shock-resolved  Hyperglycemia, likely secondary to diabetes, present on admission. - resolved  Hypertensive urgency, present on admission - resolved  Nicotine dependence.  Recreational drug abuse.  Multiple skin ulcers, present on admission.    Plan:    Patient status post day 1 of extubation after 8 days of mechanical ventilation.  T-max 98.1 overnight.  Has been hypertensive.  Rate in low 100s.  Currently on high flow nasal cannula 92%.  CBC with white blood cell count of 21,000.  Procalcitonin down to 1.16.  Creatinine 0.57.  Elevated alk phos.  Chest x-ray per my interpretation shows no significant changes in infiltrates bilaterally.  Blood cultures have been negative.  Repeat sputum culture on 11/6/2019 from bronchoscopy with moderate growth 3+ gram-negative bacilli.    Discussed with pulmonology.  Will continue supplemental oxygen and wean as tolerated.  Recommends following procalcitonin.  We will continue Rocephin.  Has added Versed and morphine as needed secondary to risk for withdrawal.  Also added Lopressor IV.    I have added a speech consult.  We will keep n.p.o. for now.  He had received tube feeds up until yesterday.    Patient remained in the ICU for now.  Monitor overall response and respiratory status after being extubated.  Anticipate multiple day stay remaining.  Will need evaluation for PT/OT once more stable.    Ange Esquivel,   11/08/19  2:07 PM    Dictated utilizing Dragon dictation.

## 2019-11-08 NOTE — PROGRESS NOTES
Adult Nutrition  Assessment/PES    Patient Name:  Eddie Prieto  YOB: 1975  MRN: [id number]  Admit Date:  10/30/2019    Assessment Date:  11/8/2019    Comments:  Rec. #1: Advance diet once medically feasible. RD to follow pt. Consult RD PRN.     Reason for Assessment     Row Name 11/08/19 0942          Reason for Assessment    Reason For Assessment  follow-up protocol     Diagnosis  substance use/abuse;metabolic state;infection/sepsis;pulmonary disease;other (see comments)  Recreational drug use, Toxic encephalopathy, Sepsis, PNA, Multiple ulcers           Anthropometrics     Row Name 11/08/19 0400          Anthropometrics    Weight  71.9 kg (158 lb 9.6 oz)         Labs/Tests/Procedures/Meds     Row Name 11/08/19 0943          Labs/Procedures/Meds    Lab Results Reviewed  reviewed, pertinent     Lab Results Comments  High: BUN, Plt. Count  Low: Cr, Albumin        Medications    Pertinent Medications Reviewed  reviewed, pertinent             Nutrition Prescription Ordered     Row Name 11/08/19 0943          Nutrition Prescription PO    Current PO Diet  NPO x 1 day        Nutrition Prescription EN    Continuous TF Current Rate (mL/hr)  -- 40     Water flush (mL)   100 mL         Evaluation of Received Nutrient/Fluid Intake     Row Name 11/08/19 0944          PO Evaluation    Number of Days PO Intake Evaluated  Insufficient Data               Problem/Interventions:  Problem 1     Row Name 11/08/19 0944          Nutrition Diagnoses Problem 1    Problem 1  Increased Nutrient Needs     Macronutrient  Kcal;Fluid;Protein     Etiology (related to)  Medical Diagnosis     Infectious Disease  Sepsis     Signs/Symptoms (evidenced by)  Other (comment) Infection         Problem 2     Row Name 11/08/19 0944          Nutrition Diagnoses Problem 2    Problem 2  Inadequate Intake/Infusion     Inadequate Intake Type  Oral     Macronutrient  Kcal;Fluid;Fiber;Protein;Fat;Carbohydrate     Micronutrient   Vitamin;Mineral     Etiology (related to)  MNT for Treatment/Condition     Signs/Symptoms (evidenced by)  NPO             Intervention Goal     Row Name 11/08/19 0944          Intervention Goal    General  Meet nutritional needs for age/condition     PO  Advance diet     Weight  No significant weight loss         Nutrition Intervention     Row Name 11/08/19 0944          Nutrition Intervention    RD/Tech Action  Follow Tx progress;Await begin PO         Nutrition Prescription     Row Name 11/08/19 0944          Nutrition Prescription PO    PO Prescription  Begin/change diet;Begin/change supplement     Begin/Change Diet to  Clear Liquid     Supplement  Boost Breeze     Supplement Frequency  2 times a day     New PO Prescription Ordered?  No, recommended        Other Orders    Supplement  -- continue with MVI         Education/Evaluation     Row Name 11/08/19 0945          Education    Education  Will Instruct as appropriate        Monitor/Evaluation    Monitor  Per protocol;I&O;Pertinent labs;Weight           Electronically signed by:  Yu Hidalgo RD  11/08/19 9:45 AM

## 2019-11-08 NOTE — PLAN OF CARE
Problem: Wound (Includes Pressure Injury) (Adult)  Intervention: Prevent and Manage Infection   11/07/19 0800 11/08/19 1600 11/08/19 1800   Prevent/Manage Colorectal Surgical Infection   Fever Reduction/Comfort Measures --  --  lightweight clothing;lightweight bedding   Hygiene Care   Oral Care --  --  --    Safety Interventions   Infection Management aseptic technique maintained --  --    Safety Management   Infection Prevention --  environmental surveillance performed;equipment surfaces disinfected;personal protective equipment utilized;rest/sleep promoted;single patient room provided --     11/08/19 1808   Prevent/Manage Colorectal Surgical Infection   Fever Reduction/Comfort Measures --    Hygiene Care   Oral Care lip lubricant applied;swabbed with antiseptic solution   Safety Interventions   Infection Management --    Safety Management   Infection Prevention --      Intervention: Provide Gentle, Aseptic Wound Care   11/08/19 1600   Skin Interventions   Skin Protection adhesive use limited;drying agents applied;electrode sites changed;incontinence pads utilized;pectin skin barriers applied;protective footwear used;pulse oximeter probe site changed;sacral silicone foam dressing in place;skin sealant/moisture barrier applied;skin-to-device areas padded;skin-to-skin areas padded;transparent dressing maintained;tubing/devices free from skin contact     Intervention: Promote Effective Wound Healing   11/08/19 0800   Nutrition Interventions   Oral Nutrition Promotion nutritional therapy counseling provided;physical activity promoted;referred to outpatient services;rest periods promoted;safe use of adaptive equipment encouraged   Pain/Comfort/Sleep Interventions   Sleep/Rest Enhancement awakenings minimized;family presence promoted;noise level reduced;regular sleep/rest pattern promoted     Intervention: Prevent Additional Skin Injury   11/08/19 1600 11/08/19 1800 11/08/19 1808   Positioning   Head of Bed (HOB) --  --   HOB at 30-45 degrees   Body Position --  turned;legs elevated;upper extremity elevated, right;weight shift assistance provided;other (see comments);upper extremity elevated, left --    Skin Interventions   Pressure Reduction Techniques --  --  frequent weight shift encouraged;heels elevated off bed;pressure points protected;positioned off wounds;rest period provided between sit times;sit time limited to 1 hour;weight shift assistance provided   Pressure Reduction Devices foam padding utilized;heel offloading device utilized;positioning supports utilized;pressure-redistributing mattress utilized;alternating pressure pump utilized;specialty bed utilized --  --      Intervention: Monitor/Manage Pain   11/08/19 1800 11/08/19 1808   Safety Management   Medication Review/Management medications reviewed;dosing adjusted;high risk medications identified;provider consulted --    Promote Oxygenation/Perfusion   Pain Management Interventions --  breathing exercises;care clustered;pain management plan reviewed with patient/caregiver;pillow support provided;position adjusted;quiet environment facilitated;see MAR;relaxation techniques promoted     Intervention: Optimize Psychosocial Wellbeing   11/08/19 1200   Coping/Psychosocial Interventions   Supportive Measures active listening utilized;decision-making supported;goal setting facilitated;positive reinforcement provided;problem solving facilitated       Goal: Signs and Symptoms of Listed Potential Problems Will be Absent, Minimized or Managed (Wound)  Outcome: Ongoing (interventions implemented as appropriate)   11/08/19 1808   Goal/Outcome Evaluation   Problems Assessed (Wound) all   Problems Present (Wound) situational response;skin injury, new onset

## 2019-11-08 NOTE — THERAPY EVALUATION
Acute Care - Speech Language Pathology   Swallow Initial Evaluation  Christopher     Patient Name: Eddie Prieto  : 1975  MRN: [id number]  Today's Date: 2019               Admit Date: 10/30/2019    Visit Dx:     ICD-10-CM ICD-9-CM   1. Acute respiratory failure with hypoxia and hypercapnia (CMS/[facility]) J96.01 518.81    J96.02    2. Elevated procalcitonin R79.89 790.99   3. Aspiration pneumonia, unspecified aspiration pneumonia type, unspecified laterality, unspecified part of lung (CMS/[facility]) J69.0 507.0   4. Polysubstance overdose, undetermined intent, initial encounter T50.904A 977.9     E980.5   5. Sepsis, due to unspecified organism, unspecified whether acute organ dysfunction present (CMS/[facility]) A41.9 038.9     995.91     Patient Active Problem List   Diagnosis   • Acute respiratory failure with hypoxia and hypercapnia (CMS/[facility])   • Accidental drug overdose   • Toxic encephalopathy   • Sepsis (CMS/[facility])   • Aspiration pneumonia of both lower lobes due to vomit (CMS/[facility])   • Tobacco dependence   • Drug abuse (CMS/[facility])   • Hyperglycemia     Past Medical History:   Diagnosis Date   • Lung disease      Past Surgical History:   Procedure Laterality Date   • BRONCHOSCOPY N/A 2019    Procedure: BRONCHOSCOPY at bedside with washings;  Surgeon: Bria Tamayo MD;  Location: [facility];  Service: Pulmonary   • LEG SURGERY      broken bone   • NECK SURGERY          SWALLOW EVALUATION (last 72 hours)      [facility] Adult Swallow Evaluation     Row Name 19 1050                   Rehab Evaluation    Document Type  evaluation  -TM        Subjective Information  no complaints  -TM        Patient Observations  cooperative;lethargic  -TM        Patient/Family Observations  family present  -TM        Patient Effort  fair  -TM           General Information    Patient Profile Reviewed  yes  -TM        Pertinent History Of Current Problem  drug overdose, asp. pneu s/p vomiting  -TM        Current Method of  Nutrition  NPO  -TM        Prior Level of Function-Communication  WFL  -TM        Prior Level of Function-Swallowing  no diet consistency restrictions  -TM        Plans/Goals Discussed with  patient and family  -TM        Barriers to Rehab  medically complex  -TM        Patient's Goals for Discharge  patient did not state  -TM        Family Goals for Discharge  patient able to return to regular diet  -TM           Pain Assessment    Additional Documentation  Pain Scale: Word Pre/Post-Treatment (Group)  -TM           Pain Scale: Word Pre/Post-Treatment    Pain: Word Scale, Pretreatment  0 - no pain  -TM        Pain: Word Scale, Post-Treatment  0 - no pain  -TM           Oral Motor and Function    Oral Lesions or Structural Abnormalities and/or variants  none identified  -TM        Dentition Assessment  edentulous, does not have dentures  -TM        Secretion Management  WNL/WFL  -TM        Mucosal Quality  moist, healthy  -TM        Volitional Cough  weak;reduced respiratory support  -TM           Oral Musculature and Cranial Nerve Assessment    Oral Motor General Assessment  generalized oral motor weakness  -TM        Lingual Impairment, Detail. Cranial Nerves IX, XII (Glossopharyngeal and Hypoglossal)  reduced strength;bilaterally  -TM        Vocal Impairment, Detail. Cranial Nerve X (Vagus)  vocal quality abnormality (see comments);impaired throat clear/cough (see comments) reduced respiratory support affecting volume & cough   -TM           General Eating/Swallowing Observations    Respiratory Support Currently in Use  nasal cannula high flow  -TM        Eating/Swallowing Skills  fed by SLP  -TM        Positioning During Eating  upright in bed  -TM        Utensils Used  spoon;cup  -TM        Consistencies Trialed  pureed;nectar/syrup-thick liquids;honey-thick liquids  -TM        Pre SpO2 (%)  92  -TM        Post SpO2 (%)  92  -TM           Respiratory    Respiratory Status  WFL;during swallowing/eating  -TM            Clinical Swallow Eval    Oral Prep Phase  impaired  -TM        Oral Transit  impaired  -TM        Oral Residue  WFL  -TM        Pharyngeal Phase  suspected pharyngeal impairment  -TM        Clinical Swallow Evaluation Summary  Oral phase impaired due to generalized reduced strength, reduced respiratory support, and limited alertness.  No overt s/s aspiration with trials of puree, honey or nectar-thick, but delayed pharyngeal swallow per palpation.  Recommend:  1. pureed diet with honey-thick liq as finn,  2. meds crushed as allowable with pudding/applesauce, 3. aspiration precautions.  Will f/u to reassess for possible diet upgrade.  -TM           Oral Prep Concerns    Oral Prep Concerns  other (see comments) general oral weakness  -TM           Oral Transit Concerns    Oral Transit Concerns  increased oral transit time  -TM        Increased Oral Transit Time  all consistencies  -TM           Pharyngeal Phase Concerns    Pharyngeal Phase Concerns  other (see comments) delayed pharyngeal swallow per palpation  -TM           Clinical Impression    SLP Swallowing Diagnosis  moderate;oral dysfunction;suspected pharyngeal dysfunction  -TM        Functional Impact  risk of aspiration/pneumonia  -TM        Rehab Potential/Prognosis, Swallowing  adequate, monitor progress closely  -TM        Swallow Criteria for Skilled Therapeutic Interventions Met  demonstrates skilled criteria  -TM           Recommendations    Therapy Frequency (Swallow)  PRN  -TM        Predicted Duration Therapy Intervention (Days)  until discharge  -TM        SLP Diet Recommendation  honey thick liquids;puree;ice chips between meals after oral care, with supervision  -TM        Recommended Precautions and Strategies  upright posture during/after eating;small bites of food and sips of liquid  -TM        SLP Rec. for Method of Medication Administration  meds crushed;with pudding or applesauce  -TM        Monitor for Signs of Aspiration  yes;notify  SLP if any concerns;cough;throat clearing;gurgly voice  -        Anticipated Dischage Disposition  unknown  -          User Key  (r) = Recorded By, (t) = Taken By, (c) = Cosigned By    Initials Name Effective Dates     Aliya Caban 03/07/18 -           EDUCATION  The patient has been educated in the following areas:   Dysphagia (Swallowing Impairment) Modified Diet Instruction.    SLP Recommendation and Plan  SLP Swallowing Diagnosis: moderate, oral dysfunction, suspected pharyngeal dysfunction  SLP Diet Recommendation: honey thick liquids, puree, ice chips between meals after oral care, with supervision  Recommended Precautions and Strategies: upright posture during/after eating, small bites of food and sips of liquid  SLP Rec. for Method of Medication Administration: meds crushed, with pudding or applesauce     Monitor for Signs of Aspiration: yes, notify SLP if any concerns, cough, throat clearing, gurgly voice     Swallow Criteria for Skilled Therapeutic Interventions Met: demonstrates skilled criteria  Anticipated Dischage Disposition: unknown  Rehab Potential/Prognosis, Swallowing: adequate, monitor progress closely  Therapy Frequency (Swallow): PRN  Predicted Duration Therapy Intervention (Days): until discharge       Plan of Care Reviewed With: patient, family  Progress: improving  Outcome Summary: Bedside eval of swallow completed.  Pt.'s assessment was affected by pt.'s generalized weakness, lethargy, and decreased respiratory support.  See report for details.  Recommend:  1. pureed diet with honey-thick liq as finn,  2. meds crushed as allowable with pudding/applesauce,  3. aspiration precautions.             Time Calculation:   Time Calculation- SLP     Row Name 11/08/19 1300             Time Calculation- SLP    SLP Start Time  1050  -      SLP Stop Time  1110  -      SLP Time Calculation (min)  20 min  -      SLP Received On  11/08/19  -        User Key  (r) = Recorded By, (t) = Taken  By, (c) = Cosigned By    Initials Name Provider Type     Aliya Caban Speech and Language Pathologist          Therapy Charges for Today     Code Description Service Date Service Provider Modifiers Qty    [id number]  ST EVAL ORAL PHARYNG SWALLOW 4 11/8/2019 Aliya Caban GN 1               Aliya Caban  11/8/2019

## 2019-11-08 NOTE — PLAN OF CARE
Problem: Patient Care Overview  Goal: Plan of Care Review  Outcome: Ongoing (interventions implemented as appropriate)   11/08/19 1257   Plan of Care Review   Progress improving   Coping/Psychosocial   Plan of Care Reviewed With patient;family   OTHER   Outcome Summary Bedside eval of swallow completed. Pt.'s assessment was affected by pt.'s generalized weakness, lethargy, and decreased respiratory support. See report for details. Recommend: 1. pureed diet with honey-thick liq as finn, 2. meds crushed as allowable with pudding/applesauce, 3. aspiration precautions.

## 2019-11-09 ENCOUNTER — APPOINTMENT (OUTPATIENT)
Dept: GENERAL RADIOLOGY | Facility: HOSPITAL | Age: 44
End: 2019-11-09

## 2019-11-09 LAB
ALBUMIN SERPL-MCNC: 2.5 G/DL (ref 3.5–5.2)
ALBUMIN/GLOB SERPL: 0.4 G/DL
ALP SERPL-CCNC: 242 U/L (ref 39–117)
ALT SERPL W P-5'-P-CCNC: 30 U/L (ref 1–41)
ANION GAP SERPL CALCULATED.3IONS-SCNC: 12.6 MMOL/L (ref 5–15)
AST SERPL-CCNC: 56 U/L (ref 1–40)
BACTERIA SPEC AEROBE CULT: NORMAL
BACTERIA SPEC AEROBE CULT: NORMAL
BACTERIA SPEC RESP CULT: ABNORMAL
BACTERIA SPEC RESP CULT: ABNORMAL
BASOPHILS # BLD AUTO: 0.07 10*3/MM3 (ref 0–0.2)
BASOPHILS NFR BLD AUTO: 0.3 % (ref 0–1.5)
BILIRUB SERPL-MCNC: 0.7 MG/DL (ref 0.2–1.2)
BUN BLD-MCNC: 23 MG/DL (ref 6–20)
BUN/CREAT SERPL: 40.4 (ref 7–25)
CALCIUM SPEC-SCNC: 8.7 MG/DL (ref 8.6–10.5)
CHLORIDE SERPL-SCNC: 103 MMOL/L (ref 98–107)
CO2 SERPL-SCNC: 28.4 MMOL/L (ref 22–29)
CREAT BLD-MCNC: 0.57 MG/DL (ref 0.76–1.27)
DEPRECATED RDW RBC AUTO: 42.1 FL (ref 37–54)
EOSINOPHIL # BLD AUTO: 0.08 10*3/MM3 (ref 0–0.4)
EOSINOPHIL NFR BLD AUTO: 0.4 % (ref 0.3–6.2)
ERYTHROCYTE [DISTWIDTH] IN BLOOD BY AUTOMATED COUNT: 14.5 % (ref 12.3–15.4)
GFR SERPL CREATININE-BSD FRML MDRD: >150 ML/MIN/1.73
GLOBULIN UR ELPH-MCNC: 5.8 GM/DL
GLUCOSE BLD-MCNC: 135 MG/DL (ref 65–99)
GLUCOSE BLDC GLUCOMTR-MCNC: 108 MG/DL (ref 70–130)
GLUCOSE BLDC GLUCOMTR-MCNC: 121 MG/DL (ref 70–130)
GLUCOSE BLDC GLUCOMTR-MCNC: 126 MG/DL (ref 70–130)
GLUCOSE BLDC GLUCOMTR-MCNC: 129 MG/DL (ref 70–130)
GLUCOSE BLDC GLUCOMTR-MCNC: 132 MG/DL (ref 70–130)
GRAM STN SPEC: ABNORMAL
HCT VFR BLD AUTO: 34.2 % (ref 37.5–51)
HGB BLD-MCNC: 10.6 G/DL (ref 13–17.7)
IMM GRANULOCYTES # BLD AUTO: 0.34 10*3/MM3 (ref 0–0.05)
IMM GRANULOCYTES NFR BLD AUTO: 1.5 % (ref 0–0.5)
LYMPHOCYTES # BLD AUTO: 2.68 10*3/MM3 (ref 0.7–3.1)
LYMPHOCYTES NFR BLD AUTO: 12.1 % (ref 19.6–45.3)
MCH RBC QN AUTO: 24.9 PG (ref 26.6–33)
MCHC RBC AUTO-ENTMCNC: 31 G/DL (ref 31.5–35.7)
MCV RBC AUTO: 80.3 FL (ref 79–97)
MONOCYTES # BLD AUTO: 1.44 10*3/MM3 (ref 0.1–0.9)
MONOCYTES NFR BLD AUTO: 6.5 % (ref 5–12)
NEUTROPHILS # BLD AUTO: 17.56 10*3/MM3 (ref 1.7–7)
NEUTROPHILS NFR BLD AUTO: 79.2 % (ref 42.7–76)
NRBC BLD AUTO-RTO: 0 /100 WBC (ref 0–0.2)
PLATELET # BLD AUTO: 546 10*3/MM3 (ref 140–450)
PMV BLD AUTO: 10.4 FL (ref 6–12)
POTASSIUM BLD-SCNC: 3.1 MMOL/L (ref 3.5–5.2)
PROT SERPL-MCNC: 8.3 G/DL (ref 6–8.5)
RBC # BLD AUTO: 4.26 10*6/MM3 (ref 4.14–5.8)
SODIUM BLD-SCNC: 144 MMOL/L (ref 136–145)
WBC NRBC COR # BLD: 22.17 10*3/MM3 (ref 3.4–10.8)

## 2019-11-09 PROCEDURE — 99233 SBSQ HOSP IP/OBS HIGH 50: CPT | Performed by: INTERNAL MEDICINE

## 2019-11-09 PROCEDURE — 25010000002 MORPHINE PER 10 MG: Performed by: INTERNAL MEDICINE

## 2019-11-09 PROCEDURE — 94799 UNLISTED PULMONARY SVC/PX: CPT

## 2019-11-09 PROCEDURE — 71045 X-RAY EXAM CHEST 1 VIEW: CPT

## 2019-11-09 PROCEDURE — 25010000002 MIDAZOLAM PER 1 MG: Performed by: INTERNAL MEDICINE

## 2019-11-09 PROCEDURE — 25010000002 HYDRALAZINE PER 20 MG: Performed by: FAMILY MEDICINE

## 2019-11-09 PROCEDURE — 80053 COMPREHEN METABOLIC PANEL: CPT | Performed by: FAMILY MEDICINE

## 2019-11-09 PROCEDURE — 25010000002 ENOXAPARIN PER 10 MG: Performed by: INTERNAL MEDICINE

## 2019-11-09 PROCEDURE — 85025 COMPLETE CBC W/AUTO DIFF WBC: CPT | Performed by: FAMILY MEDICINE

## 2019-11-09 PROCEDURE — 82962 GLUCOSE BLOOD TEST: CPT

## 2019-11-09 RX ORDER — METOPROLOL TARTRATE 50 MG/1
50 TABLET, FILM COATED ORAL EVERY 12 HOURS SCHEDULED
Status: DISCONTINUED | OUTPATIENT
Start: 2019-11-09 | End: 2019-11-12 | Stop reason: HOSPADM

## 2019-11-09 RX ORDER — AMLODIPINE BESYLATE 5 MG/1
10 TABLET ORAL
Status: DISCONTINUED | OUTPATIENT
Start: 2019-11-09 | End: 2019-11-12 | Stop reason: HOSPADM

## 2019-11-09 RX ORDER — HYDRALAZINE HYDROCHLORIDE 20 MG/ML
20 INJECTION INTRAMUSCULAR; INTRAVENOUS EVERY 6 HOURS PRN
Status: DISCONTINUED | OUTPATIENT
Start: 2019-11-09 | End: 2019-11-11

## 2019-11-09 RX ORDER — POTASSIUM CHLORIDE 1.5 G/1.77G
20 POWDER, FOR SOLUTION ORAL 2 TIMES DAILY
Status: DISCONTINUED | OUTPATIENT
Start: 2019-11-09 | End: 2019-11-11

## 2019-11-09 RX ADMIN — MORPHINE SULFATE 4 MG: 4 INJECTION INTRAVENOUS at 20:19

## 2019-11-09 RX ADMIN — SODIUM CHLORIDE, PRESERVATIVE FREE 10 ML: 5 INJECTION INTRAVENOUS at 20:18

## 2019-11-09 RX ADMIN — SODIUM CHLORIDE, PRESERVATIVE FREE 10 ML: 5 INJECTION INTRAVENOUS at 20:17

## 2019-11-09 RX ADMIN — NYSTATIN 500000 UNITS: 500000 SUSPENSION ORAL at 17:08

## 2019-11-09 RX ADMIN — Medication 1 CAPSULE: at 08:13

## 2019-11-09 RX ADMIN — ACETAMINOPHEN 649.6 MG: 650 SOLUTION ORAL at 23:57

## 2019-11-09 RX ADMIN — SODIUM CHLORIDE, PRESERVATIVE FREE 20 ML: 5 INJECTION INTRAVENOUS at 08:15

## 2019-11-09 RX ADMIN — SODIUM CHLORIDE, PRESERVATIVE FREE 10 ML: 5 INJECTION INTRAVENOUS at 09:00

## 2019-11-09 RX ADMIN — Medication 1 CAPSULE: at 20:18

## 2019-11-09 RX ADMIN — ENOXAPARIN SODIUM 40 MG: 40 INJECTION SUBCUTANEOUS at 23:56

## 2019-11-09 RX ADMIN — HYDRALAZINE HYDROCHLORIDE 20 MG: 20 INJECTION INTRAMUSCULAR; INTRAVENOUS at 03:52

## 2019-11-09 RX ADMIN — LABETALOL 20 MG/4 ML (5 MG/ML) INTRAVENOUS SYRINGE 10 MG: at 00:16

## 2019-11-09 RX ADMIN — NYSTATIN 500000 UNITS: 500000 SUSPENSION ORAL at 14:34

## 2019-11-09 RX ADMIN — METOPROLOL TARTRATE 5 MG: 1 INJECTION, SOLUTION INTRAVENOUS at 02:42

## 2019-11-09 RX ADMIN — METOPROLOL TARTRATE 50 MG: 50 TABLET, FILM COATED ORAL at 14:33

## 2019-11-09 RX ADMIN — SODIUM CHLORIDE, PRESERVATIVE FREE 10 ML: 5 INJECTION INTRAVENOUS at 08:14

## 2019-11-09 RX ADMIN — IPRATROPIUM BROMIDE AND ALBUTEROL SULFATE 3 ML: .5; 3 SOLUTION RESPIRATORY (INHALATION) at 00:43

## 2019-11-09 RX ADMIN — POTASSIUM CHLORIDE 20 MEQ: 1.5 POWDER, FOR SOLUTION ORAL at 20:20

## 2019-11-09 RX ADMIN — MIDAZOLAM HYDROCHLORIDE 2 MG: 1 INJECTION, SOLUTION INTRAMUSCULAR; INTRAVENOUS at 14:34

## 2019-11-09 RX ADMIN — IPRATROPIUM BROMIDE AND ALBUTEROL SULFATE 3 ML: .5; 3 SOLUTION RESPIRATORY (INHALATION) at 06:46

## 2019-11-09 RX ADMIN — AMLODIPINE BESYLATE 10 MG: 5 TABLET ORAL at 11:10

## 2019-11-09 RX ADMIN — METOPROLOL TARTRATE 50 MG: 50 TABLET, FILM COATED ORAL at 20:18

## 2019-11-09 RX ADMIN — NYSTATIN 500000 UNITS: 500000 SUSPENSION ORAL at 20:18

## 2019-11-09 RX ADMIN — MORPHINE SULFATE 4 MG: 4 INJECTION INTRAVENOUS at 12:41

## 2019-11-09 RX ADMIN — ENOXAPARIN SODIUM 40 MG: 40 INJECTION SUBCUTANEOUS at 00:08

## 2019-11-09 RX ADMIN — IPRATROPIUM BROMIDE AND ALBUTEROL SULFATE 3 ML: .5; 3 SOLUTION RESPIRATORY (INHALATION) at 19:32

## 2019-11-09 RX ADMIN — MORPHINE SULFATE 4 MG: 4 INJECTION INTRAVENOUS at 01:06

## 2019-11-09 RX ADMIN — MULTIVITAMIN 15 ML: LIQUID ORAL at 08:13

## 2019-11-09 RX ADMIN — POTASSIUM CHLORIDE 20 MEQ: 1.5 POWDER, FOR SOLUTION ORAL at 11:11

## 2019-11-09 RX ADMIN — MIDAZOLAM HYDROCHLORIDE 2 MG: 1 INJECTION, SOLUTION INTRAMUSCULAR; INTRAVENOUS at 00:16

## 2019-11-09 NOTE — PLAN OF CARE
Problem: Fall Risk (Adult)  Goal: Identify Related Risk Factors and Signs and Symptoms  Outcome: Ongoing (interventions implemented as appropriate)   11/09/19 1749   Fall Risk (Adult)   Related Risk Factors (Fall Risk) homeostatic imbalance;confusion/agitation

## 2019-11-09 NOTE — PROGRESS NOTES
"  CC: Acute Respiratory Failure.     S: Extubated on 2019.  More awake and alert today.  Was able to verbalize symptoms including some pain in the lower back.  Also complained of burning sensation on his tongue along with food \"not tasting good\".  Continues to have cough.  Feels that the cough continues to be productive occasionally.  Says that the frequency is mild to moderate in intensity and better than 1 day ago.      ROS: Positive for some back pain, cough and burning sensation in his mouth.  Negative for diarrhea or significant fever.    O:Vital signs reviewed.  CVP Line. Day # 10.   /86   Pulse 90   Temp 98.9 °F (37.2 °C) (Oral)   Resp 24   Ht 170.2 cm (67.01\")   Wt 71.9 kg (158 lb 9.6 oz)   SpO2 96%   BMI 24.83 kg/m²     Temp (24hrs), Av.6 °F (37 °C), Min:97.8 °F (36.6 °C), Max:99.6 °F (37.6 °C)      I & Os reviewed.   Intake/Output       19 0700 - 19 0659 19 0700 - 11/10/19 0659    Intake (ml) 271 120    Output (ml) 900 --    Net (ml) -629 120          General/Constitutional: Appears in minimal distress.  Eyes: PERRL.   Neck: Supple without JVD. No obvious masses noted. Left IJ CVP line in place.   Cardiovascular: S1 + S2. Regular.    Lungs/Respiratory: Coarse air entry bilaterally with bilateral, right greater than left crackles noted.  Respiratory effort was somewhat labored with mild tachypnea noted.  GI/Abdomen: Soft. Bowel sounds positive. No organomegaly noted.  Musculoskeletal/Extremities: Trace edema noted. Gait could not be assessed at this time, as the patient was laying in bed.   Neurologic: Alert and awake.  Was able to follow commands.  Was able to sit up to request.  Appears somewhat weak.    Psych: Appears appropriate overall.     Skin: Diffuse scattered ulcerations over upper and lower extremities seen.     Labs: Reviewed.   Results from last 7 days   Lab Units 19  0406 19  0420 19  0404   SODIUM mmol/L 144 144 143   POTASSIUM mmol/L " 3.1* 3.5 3.5   CHLORIDE mmol/L 103 103 103   CO2 mmol/L 28.4 28.7 30.7*   BUN mg/dL 23* 21* 31*   CREATININE mg/dL 0.57* 0.57* 0.85   CALCIUM mg/dL 8.7 8.9 8.5*   BILIRUBIN mg/dL 0.7 0.7 0.7   ALK PHOS U/L 242* 315* 253*   ALT (SGPT) U/L 30 25 17   AST (SGOT) U/L 56* 59* 38   GLUCOSE mg/dL 135* 124* 128*             Results from last 7 days   Lab Units 11/09/19  0406 11/08/19  0420 11/07/19  0404 11/06/19  0407 11/05/19  0439   WBC 10*3/mm3 22.17* 21.65* 10.33 8.91 9.84   NEUTROPHIL % % 79.2* 84.5* 69.7 57.7 64.0   HEMOGLOBIN g/dL 10.6* 10.2* 8.3* 8.1* 8.6*   HEMATOCRIT % 34.2* 32.3* 27.4* 26.8* 27.4*   PLATELETS 10*3/mm3 546* 452* 240 219 231             Lab Results   Component Value Date    PROCALCITO 1.16 (H) 11/08/2019    PROCALCITO 2.72 (H) 11/05/2019    PROCALCITO 7.46 (H) 11/03/2019       Lab Results   Component Value Date    PROBNP 385.2 10/30/2019              Micro: As of November 9, 2019   Lab Results   Component Value Date    RESPCX Moderate growth (3+) Citrobacter koseri (A) 11/06/2019    RESPCX Light growth (2+) Normal Respiratory Dunia 11/06/2019     Lab Results   Component Value Date    BLOODCX No growth at 2 days 11/07/2019    BLOODCX No growth at 2 days 11/07/2019       Lab Results   Component Value Date    MRSACX  10/31/2019     No Methicillin Resistant Staphylococcus aureus isolated       ABG: Reviewed.  Lab Results   Component Value Date    PHART 7.382 11/07/2019    VDF8NTH 50.5 (H) 11/07/2019    PO2ART 77.4 11/07/2019    HGBBG 10.8 (L) 11/07/2019    K3ACVKPZ 95.6 11/07/2019    CARBOXYHGB 0.9 11/07/2019       CXRay: Latest imaging study was reviewed personally.   Imaging Results (Last 24 Hours)     Procedure Component Value Units Date/Time    XR Chest 1 View [[id number]] Collected:  11/09/19 0856     Updated:  11/09/19 0900    Narrative:       PROCEDURE: XR CHEST 1 VW-     HISTORY: Resp Failure.; J96.01-Acute respiratory failure with hypoxia;  J96.02-Acute respiratory failure with hypercapnia;  R79.89-Other  specified abnormal findings of blood chemistry; J69.0-Pneumonitis due to  inhalation of food and vomit; T50.904A-Poisoning by unspecified drugs,  medicaments and biological substances, undetermined, initial encounter;  A41.9-Sepsis, unspecified organism     COMPARISON: 11/08/2019.     FINDINGS: A left internal jugular central venous catheter is in place  with tip in the SVC. The heart is normal in size. The mediastinum is  unremarkable. There has been no significant change in the patients  diffuse bilateral airspace disease. There is no pneumothorax.  There are  no acute osseous abnormalities.       Impression:       No significant change in the patients diffuse bilateral  airspace disease.     Continued followup is recommended.     This report was finalized on 11/9/2019 8:58 AM by Chris Patricio M.D..          Assessment & Recommendations/Plan:   1.  Acute Respiratory Failure  Status post bronchoscopy.  Extubated on 11/7/2019.  Continue high-dose oxygen.  Chest x-ray remains significantly abnormal.  We will repeat x-ray in the morning as well.    2.  Left sided ?aspiration? Pneumonia.   Procalcitonin level decreasing.    Will repeat procalcitonin level on the 11th.  Bronchial washing showing Citrobacter.  Respiratory cultures in the past revealed as Acinetobacter and Citrobacter.  We will discontinue ceftriaxone after tomorrow's dose.  Patient was on vancomycin which was discontinued.    3.  Drug overdose.  Toxicology screen was positive for multiple agents.  Continue morphine as needed.  Continue Versed as needed.    4.  Anemia.  Was likely dilutional.   Improved with diuresis.    5.  Oral candidiasis   We will start the patient on nystatin swish and swallow.    6.  Continued fever.  Apart from initial respiratory cultures revealing abnormality, no cultures have revealed any further positive results.  Has been on appropriate antibiotics.    7.  Hypertension  Will adjust medications according to  his blood pressure, which remains high.    8.  Leukocytosis  Blood cultures have remained negative.  Sputum cultures have revealed organism sensitive to current antibiotics.  We will asked the nursing staff to discontinue central line  We will start peripheral IVs    9.  Nutrition  Has been evaluated by speech and swallow.    10.  Deconditioning  We will recommend therapy with physical therapy and occupational therapy.    We have reviewed patient's current orders and changes, if any, have been suggested to primary care team. Plan was also discussed with nursing staff, as necessary.     This document was electronically signed by Bria Tamayo MD on 11/09/19 at 12:46 PM      Dictated utilizing Dragon dictation.

## 2019-11-09 NOTE — PROGRESS NOTES
[facility]    Name:  Eddie Prieto   Age:  44 y.o.  Sex:  male  :  1975  MRN:  [id number]   Visit Number:  [id number]  Admission Date:  10/30/2019  Date Of Service:  19  Primary Care Physician:  Shivani Canales APRN     LOS: 10 days :  Patient Care Team:  Shivani Canales APRN as PCP - General (Family Medicine):    Chief Complaint:      Follow-up on respiratory failure, drug overdose    Subjective / Interval History:   Patient's chart reviewed and events noted since hospitalization.  He is post extubation 2 days ago and is on high flow oxygen.  Patient has pneumonia with sepsis and his pressures have been on the higher range.  He is on IV metoprolol as well as as needed hydralazine.  Patient has been afebrile and Oxygenation is 94% on high flow oxygen at present.    Prior work-up:  44-year-old male with history of drug abuse was brought to the emergency room by EMS after they were called by someone who was driving the patient around stating that the patient became unresponsive.  The history is obtained from the emergency room physician and the medical record.  The patient was given 2 mg of IV Narcan by EMS with some improvement in his mental status but he was agitated and confused.  When he came to the emergency room he was ashen looking, diaphoretic and cyanotic with minimal respiratory effort.  Patient was given 2 more milligram of Narcan while getting Ambu bag ventilation with minimal response.  His oxygen saturation was 36% in the emergency room and he was subsequently intubated in the emergency room.  Patient required pressors on day of admission due to hypotension.  He was significantly tachycardic and was started on phenylephrine.  This was able to be titrated down over the following day his sinus tachycardia resolved and blood pressure stabilized.  Patient started on broad-spectrum antibiotic's with vancomycin and Zosyn.  Cultures  obtained.     Patient has been on mechanical ventilation since 10/30/2019.  He has had multiple attempts to extubate, with failure.  He is underwent bronchoscopy on 11/6/2019 with removal of significant mucus/secretions.  He is having intermittent fevers.  Has been on vancomycin and Zosyn, then Zosyn alone.  He has had repeat blood cultures.  Urology switched to Rocephin on 11/7/2019.  He was successfully extubated that evening.    Review of Systems:     General ROS: Patient denies any fevers, chills or loss of consciousness.  Respiratory ROS: Admits to cough and shortness of breath  Cardiovascular ROS: Denies chest pain or palpitations. No history of exertional chest pain.  Gastrointestinal ROS: Denies nausea and vomiting. Denies any abdominal pain. No diarrhea.  Neurological ROS: Denies any focal weakness. No loss of consciousness. Denies any numbness.  Dermatological ROS: Denies any redness or pruritis.    Vital Signs:    Temp:  [97.8 °F (36.6 °C)-99.6 °F (37.6 °C)] 97.8 °F (36.6 °C)  Heart Rate:  [] 109  Resp:  [20-26] 23  BP: (148-192)/() 166/104    Intake and output:    I/O last 3 completed shifts:  In: 821 [I.V.:821]  Out: 2200 [Urine:2200]  No intake/output data recorded.    Physical Examination:    General Appearance:  Alert and somewhat cooperative, not in any acute distress.   Head:  Atraumatic and normocephalic, without obvious abnormality.   Eyes:          PERRLA, conjunctivae and sclerae normal, no Icterus. No pallor. Extraocular movements are within normal limits.   Neck: Supple, trachea midline, no thyromegaly.   Lungs:     Crackles noted.  Somewhat decreased breath sounds.   Heart:  Normal S1 and S2, no murmur, no gallop, no rub. No JVD   Abdomen:   Normal bowel sounds, no masses, no organomegaly. Soft, non-tender, non-distended, no guarding, no rebound tenderness.   Extremities: Moves all extremities well, trace edema, no cyanosis, no clubbing.   Skin: No bleeding, bruising or rash.    Neurologic:  Is awake and alert.  Still confused.  Able to follow some commands in regards to extremity movement.  Opens eyes to voice.     Laboratory results:    Results from last 7 days   Lab Units 11/09/19  0406 11/08/19 0420 11/07/19  0404   SODIUM mmol/L 144 144 143   POTASSIUM mmol/L 3.1* 3.5 3.5   CHLORIDE mmol/L 103 103 103   CO2 mmol/L 28.4 28.7 30.7*   BUN mg/dL 23* 21* 31*   CREATININE mg/dL 0.57* 0.57* 0.85   CALCIUM mg/dL 8.7 8.9 8.5*   BILIRUBIN mg/dL 0.7 0.7 0.7   ALK PHOS U/L 242* 315* 253*   ALT (SGPT) U/L 30 25 17   AST (SGOT) U/L 56* 59* 38   GLUCOSE mg/dL 135* 124* 128*     Results from last 7 days   Lab Units 11/09/19  0406 11/08/19  0420 11/07/19  0404   WBC 10*3/mm3 22.17* 21.65* 10.33   HEMOGLOBIN g/dL 10.6* 10.2* 8.3*   HEMATOCRIT % 34.2* 32.3* 27.4*   PLATELETS 10*3/mm3 546* 452* 240             Results from last 7 days   Lab Units 11/07/19  0518 11/04/19  1135 11/04/19  1130   BLOODCX  No growth at 2 days  No growth at 2 days No growth at 4 days No growth at 4 days       I have reviewed the patient's laboratory results.    Radiology results:    Imaging Results (Last 24 Hours)     Procedure Component Value Units Date/Time    XR Chest 1 View [[id number]] Collected:  11/09/19 0856     Updated:  11/09/19 0900    Narrative:       PROCEDURE: XR CHEST 1 VW-     HISTORY: Resp Failure.; J96.01-Acute respiratory failure with hypoxia;  J96.02-Acute respiratory failure with hypercapnia; R79.89-Other  specified abnormal findings of blood chemistry; J69.0-Pneumonitis due to  inhalation of food and vomit; T50.904A-Poisoning by unspecified drugs,  medicaments and biological substances, undetermined, initial encounter;  A41.9-Sepsis, unspecified organism     COMPARISON: 11/08/2019.     FINDINGS: A left internal jugular central venous catheter is in place  with tip in the SVC. The heart is normal in size. The mediastinum is  unremarkable. There has been no significant change in the patients  diffuse  bilateral airspace disease. There is no pneumothorax.  There are  no acute osseous abnormalities.       Impression:       No significant change in the patients diffuse bilateral  airspace disease.     Continued followup is recommended.     This report was finalized on 11/9/2019 8:58 AM by Chris Patricio M.D..          I have reviewed the patient's radiology reports.    Medication Review:     I have reviewed the patients active and prn medications.       Acute respiratory failure with hypoxia and hypercapnia (CMS/HCC)    Accidental drug overdose    Toxic encephalopathy    Sepsis (CMS/HCC)    Aspiration pneumonia of both lower lobes due to vomit (CMS/HCC)    Tobacco dependence    Drug abuse (CMS/HCC)    Hyperglycemia      Assessment:    Acute toxic encephalopathy, present on admission.  Acute hypoxic and hypercapnic respiratory failure secondary to #2, present on admission.  Acute drug overdose with opiates and methamphetamine, present on admission.  Septic Shock secondary to pna, present on admission.  Bilateral lower lobe aspiration pneumonia, present on admission.  Acute kidney injury likely secondary to ATN from hypotension due to septic shock-resolved  Hyperglycemia, likely secondary to diabetes, present on admission. - resolved  Hypertensive urgency, present on admission - resolved  Nicotine dependence.  Recreational drug abuse.  Multiple skin ulcers, present on admission.  Hypokalemia mild  Plan:    1.  Patient with septic shock status post improvement due to bilateral pneumonia and hypoxic respiratory failure.  At present he is on the antibiotics still having leukocytosis and needing high flow oxygen.  Will need to continue close follow-up in the ICU with very slow tapering off the oxygen if the hypoxemia keeps on improving and the pneumonia resolves.    2.  Hypertensive urgency his pressures have been in the 160 x 110 range and IV beta-blocker will be given every 6 hours and PRN hydralazine also to be  continued.  We will also start with Norvasc 10 mg orally and follow-up closely    Discussed with pulmonology.  Will continue supplemental oxygen and wean as tolerated.  Recommends following procalcitonin.  We will continue Rocephin.      I have added a speech consult.  We will advance diet as tolerated    Patient remained in the ICU for now.  Monitor overall response and respiratory status after being extubated.  Anticipate multiple day stay remaining.  Will need evaluation for PT/OT once more stable.    Niraj Acevedo MD  11/09/19  9:52 AM    Dictated utilizing Dragon dictation.

## 2019-11-10 LAB
ALBUMIN SERPL-MCNC: 2.6 G/DL (ref 3.5–5.2)
ALBUMIN/GLOB SERPL: 0.5 G/DL
ALP SERPL-CCNC: 194 U/L (ref 39–117)
ALT SERPL W P-5'-P-CCNC: 63 U/L (ref 1–41)
ANION GAP SERPL CALCULATED.3IONS-SCNC: 11.6 MMOL/L (ref 5–15)
AST SERPL-CCNC: 88 U/L (ref 1–40)
BILIRUB SERPL-MCNC: 0.5 MG/DL (ref 0.2–1.2)
BUN BLD-MCNC: 25 MG/DL (ref 6–20)
BUN/CREAT SERPL: 47.2 (ref 7–25)
CALCIUM SPEC-SCNC: 8.8 MG/DL (ref 8.6–10.5)
CHLORIDE SERPL-SCNC: 105 MMOL/L (ref 98–107)
CO2 SERPL-SCNC: 29.4 MMOL/L (ref 22–29)
CREAT BLD-MCNC: 0.53 MG/DL (ref 0.76–1.27)
DEPRECATED RDW RBC AUTO: 42.5 FL (ref 37–54)
ERYTHROCYTE [DISTWIDTH] IN BLOOD BY AUTOMATED COUNT: 14.3 % (ref 12.3–15.4)
GFR SERPL CREATININE-BSD FRML MDRD: >150 ML/MIN/1.73
GLOBULIN UR ELPH-MCNC: 5.5 GM/DL
GLUCOSE BLD-MCNC: 136 MG/DL (ref 65–99)
GLUCOSE BLDC GLUCOMTR-MCNC: 102 MG/DL (ref 70–130)
GLUCOSE BLDC GLUCOMTR-MCNC: 112 MG/DL (ref 70–130)
GLUCOSE BLDC GLUCOMTR-MCNC: 148 MG/DL (ref 70–130)
HCT VFR BLD AUTO: 35.7 % (ref 37.5–51)
HGB BLD-MCNC: 10.8 G/DL (ref 13–17.7)
MCH RBC QN AUTO: 24.4 PG (ref 26.6–33)
MCHC RBC AUTO-ENTMCNC: 30.3 G/DL (ref 31.5–35.7)
MCV RBC AUTO: 80.8 FL (ref 79–97)
PLATELET # BLD AUTO: 589 10*3/MM3 (ref 140–450)
PMV BLD AUTO: 10.1 FL (ref 6–12)
POTASSIUM BLD-SCNC: 3 MMOL/L (ref 3.5–5.2)
PROT SERPL-MCNC: 8.1 G/DL (ref 6–8.5)
RBC # BLD AUTO: 4.42 10*6/MM3 (ref 4.14–5.8)
SODIUM BLD-SCNC: 146 MMOL/L (ref 136–145)
WBC NRBC COR # BLD: 16.88 10*3/MM3 (ref 3.4–10.8)

## 2019-11-10 PROCEDURE — 94799 UNLISTED PULMONARY SVC/PX: CPT

## 2019-11-10 PROCEDURE — 80053 COMPREHEN METABOLIC PANEL: CPT | Performed by: INTERNAL MEDICINE

## 2019-11-10 PROCEDURE — 25010000002 MORPHINE PER 10 MG: Performed by: INTERNAL MEDICINE

## 2019-11-10 PROCEDURE — 85027 COMPLETE CBC AUTOMATED: CPT | Performed by: INTERNAL MEDICINE

## 2019-11-10 PROCEDURE — 97162 PT EVAL MOD COMPLEX 30 MIN: CPT

## 2019-11-10 PROCEDURE — 25010000002 MORPHINE PER 10 MG: Performed by: FAMILY MEDICINE

## 2019-11-10 PROCEDURE — 99233 SBSQ HOSP IP/OBS HIGH 50: CPT | Performed by: INTERNAL MEDICINE

## 2019-11-10 PROCEDURE — 82962 GLUCOSE BLOOD TEST: CPT

## 2019-11-10 RX ORDER — POTASSIUM CHLORIDE 1.5 G/1.77G
20 POWDER, FOR SOLUTION ORAL 2 TIMES DAILY
Status: DISCONTINUED | OUTPATIENT
Start: 2019-11-10 | End: 2019-11-11

## 2019-11-10 RX ORDER — MORPHINE SULFATE 4 MG/ML
4 INJECTION, SOLUTION INTRAMUSCULAR; INTRAVENOUS ONCE
Status: COMPLETED | OUTPATIENT
Start: 2019-11-10 | End: 2019-11-10

## 2019-11-10 RX ADMIN — SODIUM CHLORIDE, PRESERVATIVE FREE 10 ML: 5 INJECTION INTRAVENOUS at 21:13

## 2019-11-10 RX ADMIN — NYSTATIN 500000 UNITS: 500000 SUSPENSION ORAL at 08:23

## 2019-11-10 RX ADMIN — POTASSIUM CHLORIDE 20 MEQ: 1.5 POWDER, FOR SOLUTION ORAL at 08:27

## 2019-11-10 RX ADMIN — POTASSIUM CHLORIDE 20 MEQ: 1.5 POWDER, FOR SOLUTION ORAL at 13:57

## 2019-11-10 RX ADMIN — Medication 1 CAPSULE: at 21:12

## 2019-11-10 RX ADMIN — IPRATROPIUM BROMIDE AND ALBUTEROL SULFATE 3 ML: .5; 3 SOLUTION RESPIRATORY (INHALATION) at 00:30

## 2019-11-10 RX ADMIN — METOPROLOL TARTRATE 50 MG: 50 TABLET, FILM COATED ORAL at 21:12

## 2019-11-10 RX ADMIN — ACETAMINOPHEN 650 MG: 325 TABLET, FILM COATED ORAL at 21:13

## 2019-11-10 RX ADMIN — MORPHINE SULFATE 4 MG: 4 INJECTION INTRAVENOUS at 23:15

## 2019-11-10 RX ADMIN — IPRATROPIUM BROMIDE AND ALBUTEROL SULFATE 3 ML: .5; 3 SOLUTION RESPIRATORY (INHALATION) at 19:13

## 2019-11-10 RX ADMIN — IPRATROPIUM BROMIDE AND ALBUTEROL SULFATE 3 ML: .5; 3 SOLUTION RESPIRATORY (INHALATION) at 06:51

## 2019-11-10 RX ADMIN — NYSTATIN 500000 UNITS: 500000 SUSPENSION ORAL at 21:13

## 2019-11-10 RX ADMIN — AMLODIPINE BESYLATE 10 MG: 5 TABLET ORAL at 08:23

## 2019-11-10 RX ADMIN — METOPROLOL TARTRATE 50 MG: 50 TABLET, FILM COATED ORAL at 08:23

## 2019-11-10 RX ADMIN — POTASSIUM CHLORIDE 20 MEQ: 1.5 POWDER, FOR SOLUTION ORAL at 21:12

## 2019-11-10 RX ADMIN — MORPHINE SULFATE 4 MG: 4 INJECTION INTRAVENOUS at 18:49

## 2019-11-10 RX ADMIN — Medication 1 CAPSULE: at 08:23

## 2019-11-10 RX ADMIN — MORPHINE SULFATE 4 MG: 4 INJECTION INTRAVENOUS at 15:59

## 2019-11-10 RX ADMIN — NYSTATIN 500000 UNITS: 500000 SUSPENSION ORAL at 13:57

## 2019-11-10 RX ADMIN — SODIUM CHLORIDE, PRESERVATIVE FREE 10 ML: 5 INJECTION INTRAVENOUS at 08:24

## 2019-11-10 NOTE — PLAN OF CARE
Problem: Pneumonia (Adult)  Intervention: Maximize Oxygenation/Ventilation/Perfusion   11/10/19 0444   Positioning   Head of Bed (HOB) HOB at 30-45 degrees   Respiratory Interventions   Airway/Ventilation Management humidification applied;pulmonary hygiene promoted

## 2019-11-10 NOTE — PROGRESS NOTES
[facility]    Name:  Eddie Prieto   Age:  44 y.o.  Sex:  male  :  1975  MRN:  [id number]   Visit Number:  [id number]  Admission Date:  10/30/2019  Date Of Service:  11/10/19  Primary Care Physician:  Shivani Canales APRN     LOS: 11 days :  Patient Care Team:  Shivani Canales APRN as PCP - General (Family Medicine):    Chief Complaint:      Follow-up on respiratory failure, drug overdose    Subjective / Interval History:   Patient's chart reviewed and events noted since hospitalization.  He is post extubation 3 days ago and is on high flow oxygen.  Patient has pneumonia with sepsis and his pressures have been on the higher range.  Has been on IV metoprolol and will be switched over to oral along with oral Norvasc to control his blood pressure.  He did get out of the bed sit up and has been coughing mucus.  Oxygen has been at 12 L and will taper it to 10 L and will continue to taper as tolerated.    Prior work-up:  44-year-old male with history of drug abuse was brought to the emergency room by EMS after they were called by someone who was driving the patient around stating that the patient became unresponsive.  The history is obtained from the emergency room physician and the medical record.  The patient was given 2 mg of IV Narcan by EMS with some improvement in his mental status but he was agitated and confused.  When he came to the emergency room he was ashen looking, diaphoretic and cyanotic with minimal respiratory effort.  Patient was given 2 more milligram of Narcan while getting Ambu bag ventilation with minimal response.  His oxygen saturation was 36% in the emergency room and he was subsequently intubated in the emergency room.  Patient required pressors on day of admission due to hypotension.  He was significantly tachycardic and was started on phenylephrine.  This was able to be titrated down over the following day his sinus  tachycardia resolved and blood pressure stabilized.  Patient started on broad-spectrum antibiotic's with vancomycin and Zosyn.  Cultures obtained.     Patient has been on mechanical ventilation since 10/30/2019.  He has had multiple attempts to extubate, with failure.  He is underwent bronchoscopy on 11/6/2019 with removal of significant mucus/secretions.  He is having intermittent fevers.  Has been on vancomycin and Zosyn, then Zosyn alone.  He has had repeat blood cultures.  Urology switched to Rocephin on 11/7/2019.  He was successfully extubated that evening.    Review of Systems:     General ROS: Patient denies any fevers, chills or loss of consciousness.  Respiratory ROS: Admits to cough and shortness of breath  Cardiovascular ROS: Denies chest pain or palpitations. No history of exertional chest pain.  Gastrointestinal ROS: Denies nausea and vomiting. Denies any abdominal pain. No diarrhea.  Neurological ROS: Denies any focal weakness. No loss of consciousness. Denies any numbness.  Dermatological ROS: Denies any redness or pruritis.    Vital Signs:    Temp:  [97.5 °F (36.4 °C)-98.9 °F (37.2 °C)] 98.8 °F (37.1 °C)  Heart Rate:  [] 76  Resp:  [18-26] 25  BP: (136-173)/() 158/88    Intake and output:    I/O last 3 completed shifts:  In: 468 [P.O.:120; I.V.:348]  Out: 3050 [Urine:3050]  No intake/output data recorded.    Physical Examination:    General Appearance:  Alert and somewhat cooperative, not in any acute distress.   Head:  Atraumatic and normocephalic, without obvious abnormality.   Eyes:          PERRLA, conjunctivae and sclerae normal, no Icterus. No pallor. Extraocular movements are within normal limits.   Neck: Supple, trachea midline, no thyromegaly.   Lungs:     Crackles noted.  Somewhat decreased breath sounds.   Heart:  Normal S1 and S2, no murmur, no gallop, no rub. No JVD   Abdomen:   Normal bowel sounds, no masses, no organomegaly. Soft, non-tender, non-distended, no guarding, no  rebound tenderness.   Extremities: Moves all extremities well, trace edema, no cyanosis, no clubbing.   Skin: No bleeding, bruising or rash.   Neurologic:  Is awake and alert.  Still confused.  Able to follow some commands in regards to extremity movement.  Opens eyes to voice.     Laboratory results:    Results from last 7 days   Lab Units 11/10/19  0444 11/09/19  0406 11/08/19  0420   SODIUM mmol/L 146* 144 144   POTASSIUM mmol/L 3.0* 3.1* 3.5   CHLORIDE mmol/L 105 103 103   CO2 mmol/L 29.4* 28.4 28.7   BUN mg/dL 25* 23* 21*   CREATININE mg/dL 0.53* 0.57* 0.57*   CALCIUM mg/dL 8.8 8.7 8.9   BILIRUBIN mg/dL 0.5 0.7 0.7   ALK PHOS U/L 194* 242* 315*   ALT (SGPT) U/L 63* 30 25   AST (SGOT) U/L 88* 56* 59*   GLUCOSE mg/dL 136* 135* 124*     Results from last 7 days   Lab Units 11/10/19  0444 11/09/19  0406 11/08/19  0420   WBC 10*3/mm3 16.88* 22.17* 21.65*   HEMOGLOBIN g/dL 10.8* 10.6* 10.2*   HEMATOCRIT % 35.7* 34.2* 32.3*   PLATELETS 10*3/mm3 589* 546* 452*             Results from last 7 days   Lab Units 11/07/19  0518 11/04/19  1135 11/04/19  1130   BLOODCX  No growth at 3 days  No growth at 3 days No growth at 5 days No growth at 5 days       I have reviewed the patient's laboratory results.    Radiology results:    Imaging Results (Last 24 Hours)     Procedure Component Value Units Date/Time    XR Chest 1 View [[id number]] Collected:  11/09/19 0856     Updated:  11/09/19 0900    Narrative:       PROCEDURE: XR CHEST 1 VW-     HISTORY: Resp Failure.; J96.01-Acute respiratory failure with hypoxia;  J96.02-Acute respiratory failure with hypercapnia; R79.89-Other  specified abnormal findings of blood chemistry; J69.0-Pneumonitis due to  inhalation of food and vomit; T50.904A-Poisoning by unspecified drugs,  medicaments and biological substances, undetermined, initial encounter;  A41.9-Sepsis, unspecified organism     COMPARISON: 11/08/2019.     FINDINGS: A left internal jugular central venous catheter is in  place  with tip in the SVC. The heart is normal in size. The mediastinum is  unremarkable. There has been no significant change in the patients  diffuse bilateral airspace disease. There is no pneumothorax.  There are  no acute osseous abnormalities.       Impression:       No significant change in the patients diffuse bilateral  airspace disease.     Continued followup is recommended.     This report was finalized on 11/9/2019 8:58 AM by Chris Patricio M.D..          I have reviewed the patient's radiology reports.    Medication Review:     I have reviewed the patients active and prn medications.       Acute respiratory failure with hypoxia and hypercapnia (CMS/HCC)    Accidental drug overdose    Toxic encephalopathy    Sepsis (CMS/HCC)    Aspiration pneumonia of both lower lobes due to vomit (CMS/HCC)    Tobacco dependence    Drug abuse (CMS/HCC)    Hyperglycemia      Assessment:    Acute toxic encephalopathy, present on admission.  Acute hypoxic and hypercapnic respiratory failure secondary to #2, present on admission.  Acute drug overdose with opiates and methamphetamine, present on admission.  Septic Shock secondary to pna, present on admission.  Bilateral lower lobe aspiration pneumonia, present on admission.  Acute kidney injury likely secondary to ATN from hypotension due to septic shock-resolved  Hyperglycemia, likely secondary to diabetes, present on admission. - resolved  Hypertensive urgency, present on admission - resolved  Nicotine dependence.  Recreational drug abuse.  Multiple skin ulcers, present on admission.  Hypokalemia mild  Plan:    1.  Patient with septic shock status post improvement due to bilateral pneumonia and hypoxic respiratory failure.  At present he is on the antibiotics still having leukocytosis but it is improving and is 16,000 today.  His oxygen needs and requirements are improving and will taper it to 10 L at present and further later on in the evening to 8 L if tolerated.   If he remains stable he may be transferred out of the ICU tomorrow.  We will get procalcitonin levels checked tomorrow morning    2.  Hypertensive urgency his pressures have been in  in the 160s range earlier and adding Norvasc has helped and its in the 140s and will change the IV beta-blocker to oral along with Norvasc.    3.  Diet will be changed from puréed to regular as he swallowed better with the oral pills and tablets and no clinical dysphagia noted.    Discussed with pulmonology.  Will continue supplemental oxygen and wean as tolerated.  Recommends following procalcitonin.  We will continue Rocephin.          Patient remained in the ICU for now.  Can be transferred out tomorrow if his oxygen needs to decrease in his continues to improve.  Anticipate multiple day stay remaining.  Will need evaluation for PT/OT once more stable.    Niraj Acevedo MD  11/10/19  8:59 AM    Dictated utilizing Dragon dictation.

## 2019-11-10 NOTE — PLAN OF CARE
Problem: Skin Injury Risk (Adult)  Intervention: Promote/Optimize Nutrition   11/10/19 0800   Nutrition Interventions   Oral Nutrition Promotion calorie dense foods provided;calorie dense liquids provided;nutritional therapy counseling provided;physical activity promoted;referred to outpatient services;rest periods promoted;safe use of adaptive equipment encouraged     Intervention: Prevent/Manage Excess Moisture   11/10/19 0800 11/10/19 1400 11/10/19 1627   Skin Interventions   Skin Protection adhesive use limited;drying agents applied;electrode sites changed;incontinence pads utilized;protective footwear used;pulse oximeter probe site changed;skin sealant/moisture barrier applied;skin-to-device areas padded;skin-to-skin areas padded;transparent dressing maintained;tubing/devices free from skin contact --  --    Hygiene Care   Perineal Care --  absorbent pad changed;perineum cleansed;protective cream applied;skin sealant/barrier applied --    Bathing/Skin Care --  --  back care;bath, chlorhexidine;bath, complete;dressed/undressed;incontinence care;linen changed     Intervention: Maintain Head of Bed Elevation Less Than 30 Degrees as Tolerated   11/10/19 1627   Positioning   Head of Bed (HOB) HOB at 30-45 degrees     Intervention: Prevent/Minimize Shear/Friction Injuries   11/10/19 1627   Positioning   Positioning/Transfer Devices pillows   Skin Interventions   Pressure Reduction Devices feet on footrest/footstool;heel offloading device utilized;positioning supports utilized;pressure-redistributing mattress utilized;specialty bed utilized     Intervention: Prevent or Minimize Pressure   11/10/19 1600 11/10/19 1627   Positioning   Body Position turned;upper extremity elevated, left;upper extremity elevated, right;weight shift assistance provided --    Skin Interventions   Pressure Reduction Techniques --  frequent weight shift encouraged;heels elevated off bed;positioned off wounds;pressure points protected;rest  period provided between sit times;sit time limited to 1 hour;weight shift assistance provided       Goal: Skin Health and Integrity  Outcome: Ongoing (interventions implemented as appropriate)   11/10/19 1627   Skin Injury Risk (Adult)   Skin Health and Integrity unable to achieve outcome       Problem: Pneumonia (Adult)  Intervention: Maximize Oxygenation/Ventilation/Perfusion   11/10/19 0800 11/10/19 1627   Positioning   Head of Bed (HOB) --  HOB at 30-45 degrees   Respiratory Interventions   Airway/Ventilation Management airway patency maintained;calming measures promoted;humidification applied;pulmonary hygiene promoted --    Incentive Spirometer (IS)   $ Incentive Spirometry --  yes   Incentive Spirometer Predicted Level (mL) --  1500   Administration (IS) --  instruction provided, follow-up;proper technique demonstrated   Number of Repetitions (IS) --  6   Level Incentive Spirometer (mL) --  1200   Patient Tolerance (IS) --  good     Intervention: Prevent/Manage Infection Progression   11/08/19 1800 11/09/19 0200 11/10/19 1600   Prevent/Manage Colorectal Surgical Infection   Fever Reduction/Comfort Measures lightweight clothing;lightweight bedding --  --    Promote Perineal Hygiene/Elimination Safety   Isolation Precautions --  --  contact precautions maintained   Safety Interventions   Infection Management --  aseptic technique maintained --    Safety Management   Infection Prevention --  --  environmental surveillance performed;equipment surfaces disinfected;personal protective equipment utilized;rest/sleep promoted;single patient room provided     Intervention: Monitor/Manage Fluid Electrolyte Balance   11/10/19 0800   Nutrition Interventions   Fluid/Electrolyte Management electrolyte supplement initiated;oral rehydration therapy initiated       Goal: Signs and Symptoms of Listed Potential Problems Will be Absent, Minimized or Managed (Pneumonia)  Outcome: Ongoing (interventions implemented as  appropriate)   11/10/19 1627   Goal/Outcome Evaluation   Problems Assessed (Pneumonia) all   Problems Present (Pneumonia) respiratory compromise       Problem: Fall Risk (Adult)  Intervention: Monitor/Assist with Self Care   11/10/19 1200 11/10/19 1600   Daily Care Interventions   Self-Care Promotion independence encouraged;BADL personal objects within reach;BADL personal routines maintained;meal setup provided;safe use of adaptive equipment encouraged --    Activity   Activity Assistance Provided --  assistance, 2 people     Intervention: Reduce Risk/Promote Restraint Free Environment   11/10/19 1600   Safety Management   Environmental Safety Modification assistive device/personal items within reach;clutter free environment maintained;lighting adjusted;room near unit station;room organization consistent   Safety Promotion/Fall Prevention activity supervised;fall prevention program maintained;muscle strengthening facilitated;nonskid shoes/slippers when out of bed;safety round/check completed;toileting scheduled   Prevent  Drop/Fall   Safety/Security Measures bed alarm set     Intervention: Review Medications/Identify Contributors to Fall Risk   11/10/19 1627   Safety Management   Medication Review/Management medications reviewed;high risk medications identified;provider consulted       Goal: Identify Related Risk Factors and Signs and Symptoms  Outcome: Ongoing (interventions implemented as appropriate)   11/10/19 1627   Fall Risk (Adult)   Related Risk Factors (Fall Risk) bladder function altered;fatigue/slow reaction;gait/mobility problems;history of falls;polypharmacy;sensory deficits;sleep pattern alteration;environment unfamiliar   Signs and Symptoms (Fall Risk) presence of risk factors       Problem: Wound (Includes Pressure Injury) (Adult)  Intervention: Prevent and Manage Infection   19 1800 19 0200 11/10/19 1600   Prevent/Manage Colorectal Surgical Infection   Fever Reduction/Comfort  Measures lightweight clothing;lightweight bedding --  --    Hygiene Care   Oral Care --  --  oral rinse provided   Safety Interventions   Infection Management --  aseptic technique maintained --    Safety Management   Infection Prevention --  --  environmental surveillance performed;equipment surfaces disinfected;personal protective equipment utilized;rest/sleep promoted;single patient room provided     Intervention: Provide Gentle, Aseptic Wound Care   11/10/19 0800   Skin Interventions   Skin Protection adhesive use limited;drying agents applied;electrode sites changed;incontinence pads utilized;protective footwear used;pulse oximeter probe site changed;skin sealant/moisture barrier applied;skin-to-device areas padded;skin-to-skin areas padded;transparent dressing maintained;tubing/devices free from skin contact     Intervention: Promote Effective Wound Healing   11/10/19 0800   Nutrition Interventions   Oral Nutrition Promotion calorie dense foods provided;calorie dense liquids provided;nutritional therapy counseling provided;physical activity promoted;referred to outpatient services;rest periods promoted;safe use of adaptive equipment encouraged   Pain/Comfort/Sleep Interventions   Sleep/Rest Enhancement awakenings minimized;consistent schedule promoted;noise level reduced;reading promoted;regular sleep/rest pattern promoted;relaxation techniques promoted     Intervention: Prevent Additional Skin Injury   11/10/19 1600 11/10/19 1627   Positioning   Head of Bed (HOB) --  HOB at 30-45 degrees   Body Position turned;upper extremity elevated, left;upper extremity elevated, right;weight shift assistance provided --    Skin Interventions   Pressure Reduction Techniques --  frequent weight shift encouraged;heels elevated off bed;positioned off wounds;pressure points protected;rest period provided between sit times;sit time limited to 1 hour;weight shift assistance provided   Pressure Reduction Devices --  feet on  footrest/footstool;heel offloading device utilized;positioning supports utilized;pressure-redistributing mattress utilized;specialty bed utilized     Intervention: Monitor/Manage Pain   11/10/19 1627   Safety Management   Medication Review/Management medications reviewed;high risk medications identified;provider consulted   Promote Oxygenation/Perfusion   Pain Management Interventions breathing exercises;care clustered;pain management plan reviewed with patient/caregiver;pillow support provided;position adjusted;quiet environment facilitated;see MAR;relaxation techniques promoted     Intervention: Optimize Psychosocial Wellbeing   11/10/19 1627   Coping/Psychosocial Interventions   Supportive Measures active listening utilized;decision-making supported;goal setting facilitated;positive reinforcement provided;problem solving facilitated;self-care encouraged;relaxation techniques promoted;self-reflection promoted;self-responsibility promoted;verbalization of feelings encouraged       Goal: Signs and Symptoms of Listed Potential Problems Will be Absent, Minimized or Managed (Wound)  Outcome: Ongoing (interventions implemented as appropriate)   11/10/19 1627   Goal/Outcome Evaluation   Problems Assessed (Wound) all   Problems Present (Wound) situational response;skin injury, new onset

## 2019-11-11 ENCOUNTER — APPOINTMENT (OUTPATIENT)
Dept: GENERAL RADIOLOGY | Facility: HOSPITAL | Age: 44
End: 2019-11-11

## 2019-11-11 LAB
ALBUMIN SERPL-MCNC: 2.7 G/DL (ref 3.5–5.2)
ALBUMIN/GLOB SERPL: 0.5 G/DL
ALP SERPL-CCNC: 166 U/L (ref 39–117)
ALT SERPL W P-5'-P-CCNC: 81 U/L (ref 1–41)
ANION GAP SERPL CALCULATED.3IONS-SCNC: 10.8 MMOL/L (ref 5–15)
AST SERPL-CCNC: 81 U/L (ref 1–40)
BILIRUB SERPL-MCNC: 0.5 MG/DL (ref 0.2–1.2)
BUN BLD-MCNC: 25 MG/DL (ref 6–20)
BUN/CREAT SERPL: 47.2 (ref 7–25)
CALCIUM SPEC-SCNC: 9 MG/DL (ref 8.6–10.5)
CHLORIDE SERPL-SCNC: 101 MMOL/L (ref 98–107)
CO2 SERPL-SCNC: 30.2 MMOL/L (ref 22–29)
CREAT BLD-MCNC: 0.53 MG/DL (ref 0.76–1.27)
DEPRECATED RDW RBC AUTO: 42 FL (ref 37–54)
ERYTHROCYTE [DISTWIDTH] IN BLOOD BY AUTOMATED COUNT: 14.5 % (ref 12.3–15.4)
GFR SERPL CREATININE-BSD FRML MDRD: >150 ML/MIN/1.73
GLOBULIN UR ELPH-MCNC: 5 GM/DL
GLUCOSE BLD-MCNC: 110 MG/DL (ref 65–99)
GLUCOSE BLDC GLUCOMTR-MCNC: 95 MG/DL (ref 70–130)
HCT VFR BLD AUTO: 36.2 % (ref 37.5–51)
HGB BLD-MCNC: 11.1 G/DL (ref 13–17.7)
MCH RBC QN AUTO: 24.7 PG (ref 26.6–33)
MCHC RBC AUTO-ENTMCNC: 30.7 G/DL (ref 31.5–35.7)
MCV RBC AUTO: 80.6 FL (ref 79–97)
PLATELET # BLD AUTO: 603 10*3/MM3 (ref 140–450)
PMV BLD AUTO: 9.7 FL (ref 6–12)
POTASSIUM BLD-SCNC: 3.5 MMOL/L (ref 3.5–5.2)
PROCALCITONIN SERPL-MCNC: 0.39 NG/ML (ref 0.1–0.25)
PROT SERPL-MCNC: 7.7 G/DL (ref 6–8.5)
RBC # BLD AUTO: 4.49 10*6/MM3 (ref 4.14–5.8)
SODIUM BLD-SCNC: 142 MMOL/L (ref 136–145)
WBC NRBC COR # BLD: 16.95 10*3/MM3 (ref 3.4–10.8)

## 2019-11-11 PROCEDURE — 25010000002 ENOXAPARIN PER 10 MG: Performed by: INTERNAL MEDICINE

## 2019-11-11 PROCEDURE — 94799 UNLISTED PULMONARY SVC/PX: CPT

## 2019-11-11 PROCEDURE — 99232 SBSQ HOSP IP/OBS MODERATE 35: CPT | Performed by: INTERNAL MEDICINE

## 2019-11-11 PROCEDURE — 84145 PROCALCITONIN (PCT): CPT | Performed by: INTERNAL MEDICINE

## 2019-11-11 PROCEDURE — 92526 ORAL FUNCTION THERAPY: CPT

## 2019-11-11 PROCEDURE — 80053 COMPREHEN METABOLIC PANEL: CPT | Performed by: INTERNAL MEDICINE

## 2019-11-11 PROCEDURE — 25010000002 MORPHINE PER 10 MG: Performed by: INTERNAL MEDICINE

## 2019-11-11 PROCEDURE — 82962 GLUCOSE BLOOD TEST: CPT

## 2019-11-11 PROCEDURE — 71045 X-RAY EXAM CHEST 1 VIEW: CPT

## 2019-11-11 PROCEDURE — 97166 OT EVAL MOD COMPLEX 45 MIN: CPT

## 2019-11-11 PROCEDURE — 97116 GAIT TRAINING THERAPY: CPT

## 2019-11-11 PROCEDURE — 97110 THERAPEUTIC EXERCISES: CPT

## 2019-11-11 PROCEDURE — 85027 COMPLETE CBC AUTOMATED: CPT | Performed by: INTERNAL MEDICINE

## 2019-11-11 PROCEDURE — 99233 SBSQ HOSP IP/OBS HIGH 50: CPT | Performed by: INTERNAL MEDICINE

## 2019-11-11 RX ORDER — NICOTINE 21 MG/24HR
1 PATCH, TRANSDERMAL 24 HOURS TRANSDERMAL
Status: DISCONTINUED | OUTPATIENT
Start: 2019-11-11 | End: 2019-11-12 | Stop reason: HOSPADM

## 2019-11-11 RX ORDER — POTASSIUM CHLORIDE 750 MG/1
40 CAPSULE, EXTENDED RELEASE ORAL DAILY
Status: DISCONTINUED | OUTPATIENT
Start: 2019-11-11 | End: 2019-11-12 | Stop reason: HOSPADM

## 2019-11-11 RX ADMIN — NYSTATIN 500000 UNITS: 500000 SUSPENSION ORAL at 08:40

## 2019-11-11 RX ADMIN — MORPHINE SULFATE 4 MG: 4 INJECTION INTRAVENOUS at 10:37

## 2019-11-11 RX ADMIN — Medication 1 CAPSULE: at 20:14

## 2019-11-11 RX ADMIN — SODIUM CHLORIDE, PRESERVATIVE FREE 10 ML: 5 INJECTION INTRAVENOUS at 08:41

## 2019-11-11 RX ADMIN — Medication 1 CAPSULE: at 08:40

## 2019-11-11 RX ADMIN — ENOXAPARIN SODIUM 40 MG: 40 INJECTION SUBCUTANEOUS at 00:38

## 2019-11-11 RX ADMIN — ACETAMINOPHEN 650 MG: 325 TABLET, FILM COATED ORAL at 18:58

## 2019-11-11 RX ADMIN — MORPHINE SULFATE 4 MG: 4 INJECTION INTRAVENOUS at 23:06

## 2019-11-11 RX ADMIN — IPRATROPIUM BROMIDE AND ALBUTEROL SULFATE 3 ML: .5; 3 SOLUTION RESPIRATORY (INHALATION) at 12:47

## 2019-11-11 RX ADMIN — METOPROLOL TARTRATE 50 MG: 50 TABLET, FILM COATED ORAL at 08:40

## 2019-11-11 RX ADMIN — IPRATROPIUM BROMIDE AND ALBUTEROL SULFATE 3 ML: .5; 3 SOLUTION RESPIRATORY (INHALATION) at 06:49

## 2019-11-11 RX ADMIN — METOPROLOL TARTRATE 50 MG: 50 TABLET, FILM COATED ORAL at 20:14

## 2019-11-11 RX ADMIN — NICOTINE 1 PATCH: 21 PATCH TRANSDERMAL at 12:32

## 2019-11-11 RX ADMIN — IPRATROPIUM BROMIDE AND ALBUTEROL SULFATE 3 ML: .5; 3 SOLUTION RESPIRATORY (INHALATION) at 18:52

## 2019-11-11 RX ADMIN — AMLODIPINE BESYLATE 10 MG: 5 TABLET ORAL at 08:40

## 2019-11-11 RX ADMIN — POTASSIUM CHLORIDE 40 MEQ: 750 CAPSULE, EXTENDED RELEASE ORAL at 08:40

## 2019-11-11 RX ADMIN — SODIUM CHLORIDE, PRESERVATIVE FREE 10 ML: 5 INJECTION INTRAVENOUS at 20:15

## 2019-11-11 RX ADMIN — IPRATROPIUM BROMIDE AND ALBUTEROL SULFATE 3 ML: .5; 3 SOLUTION RESPIRATORY (INHALATION) at 00:32

## 2019-11-11 RX ADMIN — NYSTATIN 500000 UNITS: 500000 SUSPENSION ORAL at 20:14

## 2019-11-11 RX ADMIN — MORPHINE SULFATE 4 MG: 4 INJECTION INTRAVENOUS at 18:58

## 2019-11-11 RX ADMIN — MORPHINE SULFATE 4 MG: 4 INJECTION INTRAVENOUS at 05:19

## 2019-11-11 RX ADMIN — MORPHINE SULFATE 4 MG: 4 INJECTION INTRAVENOUS at 14:34

## 2019-11-11 NOTE — PLAN OF CARE
Problem: Patient Care Overview  Goal: Plan of Care Review  Outcome: Ongoing (interventions implemented as appropriate)   11/10/19 2016   Coping/Psychosocial   Plan of Care Reviewed With patient   OTHER   Outcome Summary Patient participates well in PT evaluation and demonstrates poor coordination, weakness, difficulty performing transfers and gait. He is expected to benefit from additional PT services while hospitalized and upon discharge to inpatient rehab.

## 2019-11-11 NOTE — NURSING NOTE
MD Solano notified pt was alert awake and oriented this morning up to the chair with assist very polite following commands passed swallow test  and ate breakfast, vitals WDL. After visiting with his significant other patient became belligerent cussing at dinner tray repeated request for pain medicine hr increased 133 oxygen demand increased back up to 10l hiflow from 6l, pt diaphoretic unable to participate in physical therapy or get up, very lethargic. bp stable. Uncoordinated sweating incontinent, completely opposite from this morning. Pt was given a dose of prn morphine. MD Solano stated to give another one time dose of 4mg morphine. Pt was slightly confused and seeing things upon entering the room prior to giving morphine and stated he needed to rest and didn't want anymore visitors. PT holding on to the rail lying on his side wide eyed. Pt given morphine, visitors were held per patient request, bed in low position with alarm and call light in hand

## 2019-11-11 NOTE — PROGRESS NOTES
[facility]    Name:  Eddie Prieto   Age:  44 y.o.  Sex:  male  :  1975  MRN:  [id number]   Visit Number:  [id number]  Admission Date:  10/30/2019  Date Of Service:  19  Primary Care Physician:  Shivani Canales APRN     LOS: 12 days :  Patient Care Team:  Shivani Canales APRN as PCP - General (Family Medicine):    Chief Complaint:      Cough and shortness of breath.    Subjective / Interval History:     Mr. Prieto is currently lying down on the bed and is comfortable at rest.  He is currently on 8 L of high flow oxygen and saturating in the mid 90s.  He denies any chest pain and his shortness of breath has improved.  He denies any coughing while eating.  Denies any nausea or vomiting.  He did apparently have tachycardia and elevated blood pressures after his girlfriend visited him yesterday.  There was a suspicion that the girlfriend may have given him something.  He does have significant history of IV drug abuse.  He states that he lives with his girlfriend and his 4 kids.  He states that he is interested in outpatient drug rehabilitation.  He states that he would like to have a referral for methadone clinic.    This is a 44-year-old male with history of drug abuse was brought to the emergency room by EMS after they were called by someone who was driving the patient around stating that the patient became unresponsive.  The history is obtained from the emergency room physician and the medical record.  The patient was given 2 mg of IV Narcan by EMS with some improvement in his mental status but he was agitated and confused.  When he came to the emergency room he was ashen looking, diaphoretic and cyanotic with minimal respiratory effort.  Patient was given 2 more milligram of Narcan while getting Ambu bag ventilation with minimal response.  His oxygen saturation was 36% in the emergency room and he was subsequently intubated in the  emergency room.  Patient required pressors on day of admission due to hypotension.  He was significantly tachycardic and was started on phenylephrine.  This was able to be titrated down over the following day as his sinus tachycardia resolved and blood pressure stabilized.  Patient started on broad-spectrum antibiotic's with vancomycin and Zosyn.  Cultures were obtained.     Patient has been on mechanical ventilation since 10/30/2019.  He has had multiple attempts to extubate, with failure.  He did undergo bronchoscopy on 11/6/2019 with removal of significant mucus/secretions.  He had intermittent fevers.  Has been on vancomycin and Zosyn, then Zosyn alone.  His initial sputum culture grew Acinetobacter and Citrobacter along with Candida albicans.  He has had repeat blood cultures.  Repeat blood cultures have remained negative.  His repeat sputum culture grew Citrobacter sensitive to Rocephin and he was switched to Rocephin on 11/7/2019.    His central line was placed in the left IJ which was subsequently discontinued.  He has subsequently completed the course of antibiotic therapy.  He was successfully extubated in the evening of 11/7/2019.  Following extubation, patient required high flow oxygen.  He also was noted to have elevated blood pressures requiring IV metoprolol.  This was subsequently switched to oral metoprolol with addition of amlodipine.  He was transferred out of the ICU on 11/11/2019.    Review of Systems:     General ROS: Patient denies any fevers, chills or loss of consciousness.  Generalized weakness.  Respiratory ROS: Complains of cough and shortness of breath.  Cardiovascular ROS: Denies chest pain or palpitations. No history of exertional chest pain.  Gastrointestinal ROS: Denies nausea and vomiting. Denies any abdominal pain. No diarrhea.  Neurological ROS: Denies any focal weakness. No loss of consciousness. Denies any numbness.  Dermatological ROS: Denies any redness or pruritis.    Vital  Signs:    Temp:  [98 °F (36.7 °C)-99.2 °F (37.3 °C)] 98.3 °F (36.8 °C)  Heart Rate:  [] 73  Resp:  [9-27] 18  BP: (107-163)/() 133/79    Intake and output:    I/O last 3 completed shifts:  In: 2214 [P.O.:2100; I.V.:114]  Out: 2250 [Urine:2250]  No intake/output data recorded.    Physical Examination:    General Appearance:  Alert and cooperative, not in any acute distress.   Head:  Atraumatic and normocephalic, without obvious abnormality.   Eyes:          PERRLA, conjunctivae and sclerae normal, no Icterus. No pallor. Extraocular movements are within normal limits.   Neck: Supple, trachea midline, no thyromegaly, no carotid bruit.   Lungs:   Chest shape is normal. Breath sounds heard bilaterally equally.  No wheezing.  Occasional basal crackles heard.  No Pleural rub or bronchial breathing.   Heart:  Normal S1 and S2, no murmur, no gallop, no rub. No JVD   Abdomen:   Normal bowel sounds, no masses, no organomegaly. Soft, non-tender, non-distended, no guarding, no rebound tenderness. Midline surgical scar noted.   Extremities: Moves all extremities well, no edema, no cyanosis, no clubbing. Multiple skin ulcers with various stages of healing and scabs noted throughout the body including torso, abdomen and all 4 extremities.  Multiple tattoos noted on the skin.   Skin: No bleeding or rash.   Neurologic: Awake, alert and oriented times 3. Moves all 4 extremities equally.     Laboratory results:    Results from last 7 days   Lab Units 11/11/19  0457 11/10/19  0444 11/09/19  0406   SODIUM mmol/L 142 146* 144   POTASSIUM mmol/L 3.5 3.0* 3.1*   CHLORIDE mmol/L 101 105 103   CO2 mmol/L 30.2* 29.4* 28.4   BUN mg/dL 25* 25* 23*   CREATININE mg/dL 0.53* 0.53* 0.57*   CALCIUM mg/dL 9.0 8.8 8.7   BILIRUBIN mg/dL 0.5 0.5 0.7   ALK PHOS U/L 166* 194* 242*   ALT (SGPT) U/L 81* 63* 30   AST (SGOT) U/L 81* 88* 56*   GLUCOSE mg/dL 110* 136* 135*     Results from last 7 days   Lab Units 11/11/19  0457 11/10/19  0597  11/09/19  0406   WBC 10*3/mm3 16.95* 16.88* 22.17*   HEMOGLOBIN g/dL 11.1* 10.8* 10.6*   HEMATOCRIT % 36.2* 35.7* 34.2*   PLATELETS 10*3/mm3 603* 589* 546*       Results from last 7 days   Lab Units 11/07/19  0518 11/04/19  1135 11/04/19  1130   BLOODCX  No growth at 4 days  No growth at 4 days No growth at 5 days No growth at 5 days     I have reviewed the patient's laboratory results.    Radiology results:    Imaging Results (Last 24 Hours)     Procedure Component Value Units Date/Time    XR Chest 1 View [[id number]] Updated:  11/11/19 0601        I reviewed the patient's portable chest x-ray done this morning.  It still shows bilateral nonhomogeneous opacities.    I have reviewed the patient's previous radiology reports.    Medication Review:     I have reviewed the patients active and prn medications.       Acute respiratory failure with hypoxia and hypercapnia (CMS/HCC)    Accidental drug overdose    Toxic encephalopathy    Sepsis (CMS/HCC)    Aspiration pneumonia of both lower lobes due to vomit (CMS/HCC)    Tobacco dependence    Drug abuse (CMS/HCC)    Hyperglycemia    Assessment:    1.  Acute hypoxic and hypercapnic respiratory failure secondary to #2, present on admission status post mechanical ventilation from 10/30/2019 to 11/7/2019.  2.  Acute drug overdose with opiates and methamphetamine, present on admission.  3.  Acute toxic encephalopathy secondary to #2, present on admission, resolved.  4.  Septic shock secondary to #5, present on admission, resolved.  5.  Bilateral lower lobe aspiration pneumonia secondary to Acinetobacter and Citrobacter, present on admission.  6.  Hyperglycemia, likely secondary to borderline diabetes, present on admission.  7.  Hypertensive urgency, present on admission with subsequent hypotension due to sepsis.  8.  Acute kidney injury, improved.  9.  Nicotine dependence.  10.  Recreational drug abuse.  11.  Multiple skin ulcers, present on admission.  12.  Suspected  underlying COPD.    Plan:    Mr. Prieto is currently doing better with regards to his blood pressure control and pulse oxygen saturation.  He is currently on 8 L of high flow oxygen and we will continue to titrated down.  He is being followed by Dr. Tamayo from pulmonology.  He is currently on amlodipine and Lopressor.  He will be continued on bronchodilators.  He may have underlying COPD.    He will be continued on physical and occupational therapy.  We will advance his diet to regular diet.  He will discuss with case management and  regarding outpatient referral to methadone clinic.  He did have hypoglycemia initially on admission and his hemoglobin A1c level is 6.  His blood sugars are fairly stable and I will discontinue subcutaneous insulin protocol at this time.  Further recommendations depend upon his clinical course.  Once his oxygen requirement is down to below 6, he may be able to go home with home health.      Prasanna Segal MD  11/11/19  7:31 AM    Dictated utilizing Dragon dictation.

## 2019-11-11 NOTE — PLAN OF CARE
Problem: Patient Care Overview  Goal: Plan of Care Review  Outcome: Ongoing (interventions implemented as appropriate)   11/11/19 8826   Plan of Care Review   Progress improving   Coping/Psychosocial   Plan of Care Reviewed With patient   OTHER   Outcome Summary Re-evaluation of swallow completed. Oral phase remarkable only for edentulous. No overt s/s aspiration or other pharyngeal phase dysphagia. Pt.'s strength has improved since his evaluation on Friday 11/8. Recommend: 1. soft diet with thin liq as finn, 2. meds as finn, 3. aspiration precautions.

## 2019-11-11 NOTE — PLAN OF CARE
Problem: Patient Care Overview  Goal: Plan of Care Review  Outcome: Ongoing (interventions implemented as appropriate)   11/11/19 0236   Plan of Care Review   Progress no change   Coping/Psychosocial   Plan of Care Reviewed With patient;sibling     Goal: Individualization and Mutuality  Outcome: Ongoing (interventions implemented as appropriate)    Goal: Discharge Needs Assessment  Outcome: Ongoing (interventions implemented as appropriate)    Goal: Interprofessional Rounds/Family Conf  Outcome: Ongoing (interventions implemented as appropriate)      Problem: Skin Injury Risk (Adult)  Goal: Skin Health and Integrity  Outcome: Ongoing (interventions implemented as appropriate)      Problem: Pneumonia (Adult)  Goal: Signs and Symptoms of Listed Potential Problems Will be Absent, Minimized or Managed (Pneumonia)  Outcome: Ongoing (interventions implemented as appropriate)      Problem: Fall Risk (Adult)  Goal: Identify Related Risk Factors and Signs and Symptoms  Outcome: Ongoing (interventions implemented as appropriate)      Problem: Wound (Includes Pressure Injury) (Adult)  Goal: Signs and Symptoms of Listed Potential Problems Will be Absent, Minimized or Managed (Wound)  Outcome: Ongoing (interventions implemented as appropriate)

## 2019-11-11 NOTE — THERAPY TREATMENT NOTE
Acute Care - Physical Therapy Treatment Note  [facility]     Patient Name: Eddie Prieto  : 1975  MRN: [id number]  Today's Date: 2019  Onset of Illness/Injury or Date of Surgery: 10/30/19     Referring Physician: Dr. Tamayo    Admit Date: 10/30/2019    Visit Dx:    ICD-10-CM ICD-9-CM   1. Acute respiratory failure with hypoxia and hypercapnia (CMS/[facility]) J96.01 518.81    J96.02    2. Elevated procalcitonin R79.89 790.99   3. Aspiration pneumonia, unspecified aspiration pneumonia type, unspecified laterality, unspecified part of lung (CMS/[facility]) J69.0 507.0   4. Polysubstance overdose, undetermined intent, initial encounter T50.904A 977.9     E980.5   5. Sepsis, due to unspecified organism, unspecified whether acute organ dysfunction present (CMS/[facility]) A41.9 038.9     995.91   6. Impaired mobility and ADLs Z74.09 799.89     Patient Active Problem List   Diagnosis   • Acute respiratory failure with hypoxia and hypercapnia (CMS/[facility])   • Accidental drug overdose   • Toxic encephalopathy   • Sepsis (CMS/[facility])   • Aspiration pneumonia of both lower lobes due to vomit (CMS/[facility])   • Tobacco dependence   • Drug abuse (CMS/[facility])   • Hyperglycemia       Therapy Treatment    Rehabilitation Treatment Summary     Row Name 19 0920             Treatment Time/Intention    Discipline  physical therapy assistant  -RM      Document Type  therapy note (daily note)  -RM      Subjective Information  no complaints  -RM      Mode of Treatment  physical therapy  -RM      Care Plan Review  care plan/treatment goals reviewed  -RM      Patient Effort  good  -RM      Existing Precautions/Restrictions  fall;oxygen therapy device and L/min  -RM      Treatment Considerations/Comments  10 lpm hi-sally NC   -RM      Recorded by [RM] Kenji Heller, PTA 19 1256      Row Name 19 9563             Vital Signs    Pre SpO2 (%)  90  -RM      O2 Delivery Pre Treatment  supplemental O2  -RM      Intra SpO2 (%)  88  -RM      O2  Delivery Intra Treatment  supplemental O2  -RM      Post SpO2 (%)  91  -RM      O2 Delivery Post Treatment  supplemental O2  -RM      Pre Patient Position  Supine  -RM      Intra Patient Position  Standing  -RM      Post Patient Position  Sitting  -RM      Recorded by [RM] Kenji Heller, PTA 11/11/19 1256      Row Name 11/11/19 0940             Safety Issues, Functional Mobility    Safety Issues Affecting Function (Mobility)  safety precaution awareness;safety precautions follow-through/compliance;positioning of assistive device;insight into deficits/self awareness  -RM      Impairments Affecting Function (Mobility)  balance;endurance/activity tolerance;strength;shortness of breath  -RM      Recorded by [RM] Kenji Heller, PTA 11/11/19 1256      Row Name 11/11/19 0940             Bed Mobility Assessment/Treatment    Supine-Sit [place] (Bed Mobility)  contact guard;verbal cues  -RM      Bed Mobility, Safety Issues  decreased use of arms for pushing/pulling;decreased use of legs for bridging/pushing  -RM      Assistive Device (Bed Mobility)  bed rails;head of bed elevated  -RM      Recorded by [RM] Kenji Heller, PTA 11/11/19 1256      Row Name 11/11/19 0940             Sit-Stand Transfer    Sit-Stand [place] (Transfers)  minimum assist (75% patient effort);verbal cues  -RM      Assistive Device (Sit-Stand Transfers)  walker, front-wheeled  -RM      Recorded by [RM] Kenji Heller, PTA 11/11/19 1256      Row Name 11/11/19 0940             Stand-Sit Transfer    Stand-Sit [place] (Transfers)  minimum assist (75% patient effort);verbal cues  -RM      Assistive Device (Stand-Sit Transfers)  walker, front-wheeled  -RM      Recorded by [RM] Kenji Heller, PTA 11/11/19 1256      Row Name 11/11/19 0940             Gait/Stairs Assessment/Training    [place] Level (Gait)  minimum assist (75% patient effort);verbal cues  -RM      Assistive Device (Gait)  walker, front-wheeled  -RM       Distance in Feet (Gait)  111  -RM      Pattern (Gait)  step-to;step-through  -RM      Deviations/Abnormal Patterns (Gait)  base of support, wide;gait speed decreased;stride length decreased  -RM      Bilateral Gait Deviations  forward flexed posture;weight shift ability decreased  -RM      Recorded by [RM] Kenji Heller, PTA 11/11/19 1256      Row Name 11/11/19 0940             Motor Skills Assessment/Interventions    Additional Documentation  Therapeutic Exercise (Group)  -RM      Recorded by [] Kenji Heller, PTA 11/11/19 1256      Row Name 11/11/19 0940             Therapeutic Exercise    Exercise Type (Therapeutic Exercise)  isotonic contraction, concentric;isotonic contraction, eccentric  -RM      Position (Therapeutic Exercise)  standing  -RM      Sets/Reps (Therapeutic Exercise)  10  -RM      Recorded by [] Kenji Heller, [facility] 11/11/19 1256      Row Name 11/11/19 0940             Static Sitting Balance    Level of [place] (Unsupported Sitting, Static Balance)  supervision  -RM      Sitting Position (Unsupported Sitting, Static Balance)  sitting on edge of bed  -RM      Recorded by [] Kenji Heller, PTA 11/11/19 1256      Row Name 11/11/19 0940             Dynamic Sitting Balance    Level of [place], Reaches Outside Midline (Sitting, Dynamic Balance)  standby assist;contact guard assist  -RM      Sitting Position, Reaches Outside Midline (Sitting, Dynamic Balance)  sitting on edge of bed  -RM      Recorded by [] Kenji Heller, PTA 11/11/19 1256      Row Name 11/11/19 0940             Static Standing Balance    Level of [place] (Supported Standing, Static Balance)  contact guard assist;minimal assist, 75% patient effort  -RM      Assistive Device Utilized (Supported Standing, Static Balance)  walker, rolling  -RM      Recorded by [RM] Kenji Heller, PTA 11/11/19 1256      Row Name 11/11/19 0940             Dynamic Standing Balance    Level of [place], Reaches  Outside Midline (Standing, Dynamic Balance)  minimal assist, 75% patient effort  -RM      Assistive Device Utilized (Supported Standing, Dynamic Balance)  walker, rolling  -RM      Recorded by [RM] Kenji Heller, PTA 11/11/19 1256      Row Name 11/11/19 0940             Positioning and Restraints    Pre-Treatment Position  in bed  -RM      Post Treatment Position  chair  -RM      In Chair  reclined;call light within reach;encouraged to call for assist;exit alarm on;notified nsg  -RM      Recorded by [RM] Kenji Heller, PTA 11/11/19 1256      Row Name 11/11/19 0940             Pain Scale: Numbers Pre/Post-Treatment    Pain Scale: Numbers, Pretreatment  0/10 - no pain  -RM      Pain Scale: Numbers, Post-Treatment  0/10 - no pain  -RM      Recorded by [RM] Kenji Heller, PTA 11/11/19 1256      Row Name                Wound 11/08/19 1200 coccyx Pressure Injury    Wound - Properties Group Date first assessed: 11/08/19 [CM] Time first assessed: 1200 [CM] Location: coccyx [CM] Primary Wound Type: Pressure inj [CM] Stage, Pressure Injury: Stage 1;deep tissue injury [CM] Recorded by:  [CM] Sania Sanchez RN 11/08/19 1724    Row Name                Wound 11/10/19 2200 Right nostril Pressure Injury    Wound - Properties Group Date first assessed: 11/10/19 [CW] Time first assessed: 2200 [CW] Present on Hospital Admission: N [CW] Side: Right [CW] Location: nostril [CW] Primary Wound Type: Pressure inj [CW] Stage, Pressure Injury: medical device related [CW] Recorded by:  [CW] Kesha Centeno, RN 11/11/19 0130    Row Name 11/11/19 0940             Outcome Summary/Treatment Plan (PT)    Daily Summary of Progress (PT)  progress toward functional goals is good  -RM      Plan for Continued Treatment (PT)  Cont PT per POC.   -RM      Recorded by [RM] Kenji Heller, PTA 11/11/19 1256        User Key  (r) = Recorded By, (t) = Taken By, (c) = Cosigned By    Initials Name Effective Dates Discipline    CW Kesha Centeno  RN 05/01/17 -  Nurse    Kenji Bautista, PTA 03/07/18 -  PT    Sania Pratt RN 10/16/17 -  Nurse          Wound 11/08/19 1200 coccyx Pressure Injury (Active)   Dressing Appearance dry;intact 11/11/2019  8:00 AM   Closure CHUN 11/11/2019  4:00 AM   Base pink;blanchable 11/11/2019  8:00 AM   Dressing Care, Wound other (see comments) 11/11/2019  8:00 AM       Wound 11/10/19 2200 Right nostril Pressure Injury (Active)   Wound Image   11/11/2019  4:50 AM   Dressing Appearance open to air 11/11/2019  8:00 AM   Closure None 11/11/2019  8:00 AM   Base scab 11/11/2019  8:00 AM   Drainage Amount none 11/11/2019  8:00 AM       Rehab Goal Summary     Row Name 11/11/19 1207 11/11/19 0942          Occupational Therapy Goals    Transfer Goal Selection (OT)  --  transfer, OT goal 1  -SD     Dressing Goal Selection (OT)  --  dressing, OT goal 1  -SD     Toileting Goal Selection (OT)  --  toileting, OT goal 1  -SD     Strength Goal Selection (OT)  --  strength, OT goal 1  -SD     Functional Mobility Goal Selection (OT)  --  functional mobility, OT goal 1  -SD        Transfer Goal 1 (OT)    Activity/Assistive Device (Transfer Goal 1, OT)  --  sit-to-stand/stand-to-sit;walker, rolling  -SD     [place] Level/Cues Needed (Transfer Goal 1, OT)  --  standby assist  -SD     Time Frame (Transfer Goal 1, OT)  --  2 weeks  -SD     Progress/Outcome (Transfer Goal 1, OT)  --  goal ongoing  -SD        Dressing Goal 1 (OT)    Activity/Assistive Device (Dressing Goal 1, OT)  --  lower body dressing  -SD     [place]/Cues Needed (Dressing Goal 1, OT)  --  standby assist  -SD     Time Frame (Dressing Goal 1, OT)  --  2 weeks  -SD     Progress/Outcome (Dressing Goal 1, OT)  --  goal ongoing  -SD        Toileting Goal 1 (OT)    Activity/Device (Toileting Goal 1, OT)  --  toileting skills, all;commode  -SD     [place] Level/Cues Needed (Toileting Goal 1, OT)  --  standby assist  -SD     Time Frame (Toileting Goal 1, OT)  --  2  weeks  -SD     Progress/Outcome (Toileting Goal 1, OT)  --  goal ongoing  -SD        Strength Goal 1 (OT)    Strength Goal 1 (OT)  --  patient to perform UB ther ex as tolerated  -SD     Time Frame (Strength Goal 1, OT)  --  long term goal (LTG)  -SD     Progress/Outcome (Strength Goal 1, OT)  --  goal ongoing  -SD        Functional Mobility Goal 1 (OT)    Activity/Assistive Device (Functional Mobility Goal 1, OT)  --  walker, rolling  -SD     [place] Level/Cues Needed (Functional Mobility Goal 1, OT)  --  standby assist;contact guard assist  -SD     Distance Goal 1 (Functional Mobility, OT)  --  200  -SD     Time Frame (Functional Mobility Goal 1, OT)  --  long term goal (LTG)  -SD     Progress/Outcome (Functional Mobility Goal 1, OT)  --  goal ongoing  -SD        Swallow Goals (SLP)    Oral Nutrition/Hydration Goal Selection (SLP)  oral nutrition/hydration, SLP goal 1  -TM  --        Oral Nutrition/Hydration Goal 1 (SLP)    Oral Nutrition/Hydration Goal 1, SLP  tolerate soft with thin liq  -TM  --     Time Frame (Oral Nutrition/Hydration Goal 1, SLP)  1 day  -TM  --     Progress/Outcomes (Oral Nutrition/Hydration Goal 1, SLP)  goal revised this date  -TM  --       User Key  (r) = Recorded By, (t) = Taken By, (c) = Cosigned By    Initials Name Provider Type Discipline    Aliya Chávez Speech and Language Pathologist SLP    Fallon Peoples OT Occupational Therapist OT          Physical Therapy Education     Title: PT OT SLP Therapies (In Progress)     Topic: Physical Therapy (In Progress)     Point: Mobility training (Done)     Learning Progress Summary           Patient Acceptance, E,TB,D, VU,NR by  at 11/11/2019 12:57 PM     by  at 11/10/2019  6:29 PM    Comment:      Acceptance, E,TB, VU by  at 11/10/2019  6:26 PM    Comment:  Role of PT                   Point: Home exercise program (Done)     Learning Progress Summary           Patient Acceptance, E,TB,D, VU,NR by  at 11/11/2019 12:57 PM                                User Key     Initials Effective Dates Name Provider Type Discipline    JR 04/03/18 -  Vita Marques, PT Physical Therapist PT    RM 03/07/18 -  Kenji Heller, PTA Physical Therapy Assistant PT                PT Recommendation and Plan     Outcome Summary/Treatment Plan (PT)  Daily Summary of Progress (PT): progress toward functional goals is good  Plan for Continued Treatment (PT): Cont PT per POC.   Plan of Care Reviewed With: patient  Progress: improving  Outcome Summary: Pt tolerated treatment well. Pt continues to require assistance with balance when in standing and is not aware of amount of assistance pt requires. See flowsheet for details.   Outcome Measures     Row Name 11/11/19 0942 11/11/19 0940          How much help from another person do you currently need...    Turning from your back to your side while in flat bed without using bedrails?  --  3  -RM     Moving from lying on back to sitting on the side of a flat bed without bedrails?  --  3  -RM     Moving to and from a bed to a chair (including a wheelchair)?  --  3  -RM     Standing up from a chair using your arms (e.g., wheelchair, bedside chair)?  --  2  -RM     Climbing 3-5 steps with a railing?  --  2  -RM     To walk in hospital room?  --  3  -RM     AM-PAC 6 Clicks Score (PT)  --  16  -RM        How much help from another is currently needed...    Putting on and taking off regular lower body clothing?  3  -SD  --     Bathing (including washing, rinsing, and drying)  3  -SD  --     Toileting (which includes using toilet bed pan or urinal)  3  -SD  --     Putting on and taking off regular upper body clothing  3  -SD  --     Taking care of personal grooming (such as brushing teeth)  3  -SD  --     Eating meals  4  -SD  --     AM-PAC 6 Clicks Score (OT)  19  -SD  --        Functional Assessment    Outcome Measure Options  AM-PAC 6 Clicks Daily Activity (OT)  -SD  AM-PAC 6 Clicks Basic Mobility (PT)  -RM       User Key   (r) = Recorded By, (t) = Taken By, (c) = Cosigned By    Initials Name Provider Type    Kenji Bautista, ABI Physical Therapy Assistant    Fallon Peoples, OT Occupational Therapist         Time Calculation:   PT Charges     Row Name 11/11/19 1301             Time Calculation    Start Time  0940  -RM      PT Received On  11/11/19  -RM      PT Goal Re-Cert Due Date  11/20/19  -RM         Time Calculation- PT    Total Timed Code Minutes- PT  29 minute(s)  -RM         Timed Charges    47407 - PT Therapeutic Exercise Minutes  14  -RM      53255 - Gait Training Minutes   15  -RM        User Key  (r) = Recorded By, (t) = Taken By, (c) = Cosigned By    Initials Name Provider Type    Kenji Bautista, ABI Physical Therapy Assistant        Therapy Charges for Today     Code Description Service Date Service Provider Modifiers Qty    11667305352 HC PT THER PROC EA 15 MIN 11/11/2019 Kenji Heller, PTA GP 1    31528482624 HC GAIT TRAINING EA 15 MIN 11/11/2019 Kenji Heller, PTA GP 1          PT G-Codes  Outcome Measure Options: AM-PAC 6 Clicks Daily Activity (OT)  AM-PAC 6 Clicks Score (PT): 16  AM-PAC 6 Clicks Score (OT): 19    Kenji Heller PTA  11/11/2019

## 2019-11-11 NOTE — PROGRESS NOTES
Continued Stay Note  [facility]     Patient Name: Eddie Prieto  MRN: [id number]  Today's Date: 11/11/2019    Admit Date: 10/30/2019    Discharge Plan     Row Name 11/11/19 1158       Plan    Plan  Follow up for discharge planning. Pt. just out of ICU. Discussed physical rehab at [facility] or swing bed. Pt. declines and states he wants to attend drug and alcohol rehab. Offered list of treatment facilities. He states he is aware of facilities and how to contact them. States he is going home with his sister, Saumya, and she can transport him home.        Discharge Codes    No documentation.       Expected Discharge Date and Time     Expected Discharge Date Expected Discharge Time    Nov 14, 2019             Scarlett Acevedo LCSW

## 2019-11-11 NOTE — PLAN OF CARE
Problem: Patient Care Overview  Goal: Plan of Care Review  Outcome: Ongoing (interventions implemented as appropriate)   11/11/19 1257   Plan of Care Review   Progress improving   Coping/Psychosocial   Plan of Care Reviewed With patient   OTHER   Outcome Summary Pt tolerated treatment well. Pt continues to require assistance with balance when in standing and is not aware of amount of assistance pt requires. See flowsheet for details.

## 2019-11-11 NOTE — THERAPY EVALUATION
Patient Name: Eddie Prieto  : 1975    MRN: [id number]                              Today's Date: 2019       Admit Date: 10/30/2019    Visit Dx:     ICD-10-CM ICD-9-CM   1. Acute respiratory failure with hypoxia and hypercapnia (CMS/[facility]) J96.01 518.81    J96.02    2. Elevated procalcitonin R79.89 790.99   3. Aspiration pneumonia, unspecified aspiration pneumonia type, unspecified laterality, unspecified part of lung (CMS/[facility]) J69.0 507.0   4. Polysubstance overdose, undetermined intent, initial encounter T50.904A 977.9     E980.5   5. Sepsis, due to unspecified organism, unspecified whether acute organ dysfunction present (CMS/[facility]) A41.9 038.9     995.91     Patient Active Problem List   Diagnosis   • Acute respiratory failure with hypoxia and hypercapnia (CMS/[facility])   • Accidental drug overdose   • Toxic encephalopathy   • Sepsis (CMS/[facility])   • Aspiration pneumonia of both lower lobes due to vomit (CMS/[facility])   • Tobacco dependence   • Drug abuse (CMS/[facility])   • Hyperglycemia     Past Medical History:   Diagnosis Date   • Impaired functional mobility, balance, gait, and endurance    • Lung disease      Past Surgical History:   Procedure Laterality Date   • BRONCHOSCOPY N/A 2019    Procedure: BRONCHOSCOPY at bedside with washings;  Surgeon: Bria Tamayo MD;  Location: [facility];  Service: Pulmonary   • LEG SURGERY      broken bone   • NECK SURGERY          11/10/19 1829   PT Evaluation Time/Intention   Document Type evaluation   Mode of Treatment physical therapy   General Information   Patient Profile Reviewed? yes   Prior Level of Function independent:;all household mobility;community mobility   Existing Precautions/Restrictions oxygen therapy device and L/min   Relationship/Environment   Lives With significant other   Resource/Environmental Concerns   Current Living Arrangements home/apartment/condo   Cognitive Assessment/Intervention- PT/OT   Orientation Status (Cognition) oriented x  4;verbal cues/prompts needed for orientation   Safety Issues, Functional Mobility   Safety Issues Affecting Function (Mobility) awareness of need for assistance;insight into deficits/self awareness;safety precautions follow-through/compliance;sequencing abilities;impulsivity   Impairments Affecting Function (Mobility) coordination;strength   Bed Mobility Assessment/Treatment   Bed Mobility Assessment/Treatment supine-sit;sit-supine   Supine-Sit [place] (Bed Mobility) contact guard   Sit-Supine [place] (Bed Mobility) contact guard   Assistive Device (Bed Mobility) head of bed elevated;bed rails;draw sheet   Transfer Assessment/Treatment   Sit-Stand [place] (Transfers) minimum assist (75% patient effort);moderate assist (50% patient effort)   Sit-Stand Transfer   Assistive Device (Sit-Stand Transfers) walker, front-wheeled   Gait/Stairs Assessment/Training   [place] Level (Gait) moderate assist (50% patient effort)   Assistive Device (Gait) walker, front-wheeled   Distance in Feet (Gait) 2   Pattern (Gait) step-to   Deviations/Abnormal Patterns (Gait) base of support, wide;festinating/shuffling;gait speed decreased  (posterior list)   Bilateral Gait Deviations heel strike decreased   Comment (Gait/Stairs) side stepping toward the head of the bed    General ROM   GENERAL ROM COMMENTS Grossly WFL with poor coordination   MMT (Manual Muscle Testing)   General MMT Comments BLE=4/5   Static Sitting Balance   Level of [place] (Unsupported Sitting, Static Balance) supervision   Sitting Position (Unsupported Sitting, Static Balance) sitting on edge of bed   Dynamic Sitting Balance   Sitting Position, Reaches Outside Midline (Sitting, Dynamic Balance) sitting on edge of bed   Level of [place], Reaches Outside Midline (Sitting, Dynamic Balance) contact guard assist   Static Standing Balance   Level of [place] (Supported Standing, Static Balance) minimal assist, 75% patient effort   Assistive  Device Utilized (Supported Standing, Static Balance) walker, rolling   Dynamic Standing Balance   Level of [place], Reaches Outside Midline (Standing, Dynamic Balance) moderate assist, 50 to 74% patient effort   Assistive Device Utilized (Supported Standing, Dynamic Balance) walker, rolling   Pain Assessment   Additional Documentation Pain Scale: Numbers Pre/Post-Treatment (Group)   Pain Scale: Numbers Pre/Post-Treatment   Pain Intervention(s) Repositioned;Ambulation/increased activity   Pain Scale: Numbers, Pretreatment 8/10   Pain Location back   Pain Scale: Numbers, Post-Treatment 8/10   Pre/Post Treatment Pain Comment patient reports that 8/10 is his normal pain level prior to hospitalization   Plan of Care Review   Plan of Care Reviewed With patient   Physical Therapy Clinical Impression   Patient/Family Goals Statement (PT Clinical Impression) Patient wants to get well and go home   Criteria for Skilled Interventions Met (PT Clinical Impression) yes;treatment indicated   Rehab Potential (PT Clinical Summary) good, to achieve stated therapy goals   Therapy Frequency (PT Clinical Impression) daily   Anticipated Discharge Disposition (PT) inpatient rehabilitation facility   Vital Signs   Pre SpO2 (%) 92   O2 Delivery Pre Treatment supplemental O2   Intra SpO2 (%) 93   O2 Delivery Intra Treatment supplemental O2   Post SpO2 (%) 92   O2 Delivery Post Treatment supplemental O2   Pre Patient Position Supine   Intra Patient Position Standing   Post Patient Position Supine   Positioning and Restraints   Pre-Treatment Position in bed   Post Treatment Position bed   In Bed supine;call light within reach;encouraged to call for assist;notified nsg       General Information    No documentation.       Mobility    No documentation.       Obj/Interventions    No documentation.       Goals/Plan    No documentation.       Clinical Impression    No documentation.       Outcome Measures    No documentation.       Physical  Therapy Education     Title: PT OT SLP Therapies (In Progress)     Topic: Physical Therapy (In Progress)     Point: Mobility training (Done)     Learning Progress Summary           Patient Acceptance, E,TB, VU by  at 11/10/2019  6:29 PM    Comment:  Role of PT while hospitalized and upon discharge to outpatient cardiac rehab                               User Key     Initials Effective Dates Name Provider Type Discipline     04/03/18 -  Vita Marques, PT Physical Therapist PT              PT Recommendation and Plan     Outcome Summary/Treatment Plan (PT)  Anticipated Discharge Disposition (PT): inpatient rehabilitation facility  Plan of Care Reviewed With: patient  Outcome Summary: Patient participates well in PT evaluation and demonstrates poor coordination, weakness, difficulty performing transfers and gait.  He is expected to benefit from additional PT services while hospitalized and upon discharge to inpatient rehab.     Time Calculation:     Therapy Charges for Today     Code Description Service Date Service Provider Modifiers Qty    74117785319 HC PT EVAL MOD COMPLEXITY 3 11/10/2019 Vita Marques, PT GP 1          PT G-Codes  Outcome Measure Options: AM-PAC 6 Clicks Basic Mobility (PT)  AM-PAC 6 Clicks Score (PT): 13    Vita Marques, PT  11/11/2019

## 2019-11-11 NOTE — THERAPY EVALUATION
Acute Care - Occupational Therapy Initial Evaluation  [facility]     Patient Name: Eddie Prieto  : 1975  MRN: [id number]  Today's Date: 2019  Onset of Illness/Injury or Date of Surgery: 10/30/19  Date of Referral to OT: 19  Referring Physician: Dr. Tamayo    Admit Date: 10/30/2019       ICD-10-CM ICD-9-CM   1. Acute respiratory failure with hypoxia and hypercapnia (CMS/[facility]) J96.01 518.81    J96.02    2. Elevated procalcitonin R79.89 790.99   3. Aspiration pneumonia, unspecified aspiration pneumonia type, unspecified laterality, unspecified part of lung (CMS/[facility]) J69.0 507.0   4. Polysubstance overdose, undetermined intent, initial encounter T50.904A 977.9     E980.5   5. Sepsis, due to unspecified organism, unspecified whether acute organ dysfunction present (CMS/[facility]) A41.9 038.9     995.91   6. Impaired mobility and ADLs Z74.09 799.89     Patient Active Problem List   Diagnosis   • Acute respiratory failure with hypoxia and hypercapnia (CMS/HCC)   • Accidental drug overdose   • Toxic encephalopathy   • Sepsis (CMS/HCC)   • Aspiration pneumonia of both lower lobes due to vomit (CMS/HCC)   • Tobacco dependence   • Drug abuse (CMS/HCC)   • Hyperglycemia     Past Medical History:   Diagnosis Date   • Impaired functional mobility, balance, gait, and endurance    • Lung disease      Past Surgical History:   Procedure Laterality Date   • BRONCHOSCOPY N/A 2019    Procedure: BRONCHOSCOPY at bedside with washings;  Surgeon: Bria Tamayo MD;  Location: [facility];  Service: Pulmonary   • LEG SURGERY      broken bone   • NECK SURGERY            OT ASSESSMENT FLOWSHEET (last 12 hours)      Occupational Therapy Evaluation     Row Name 19 0942                   OT Evaluation Time/Intention    Subjective Information  complains of;pain  -SD        Document Type  evaluation  -SD        Mode of Treatment  occupational therapy  -SD        Patient Effort  good  -SD        Symptoms Noted  During/After Treatment  fatigue  -SD           General Information    Patient Profile Reviewed?  yes  -SD        Onset of Illness/Injury or Date of Surgery  10/30/19  -SD        Referring Physician  Dr. Tamayo  -SD        Patient Observations  alert;cooperative;agree to therapy  -SD        General Observations of Patient  reclined in chair, on 8L O2  -SD        Prior Level of Function  independent:;community mobility  -SD        Equipment Currently Used at Home  cane, straight;commode, bedside;wheelchair;shower chair  -SD        Pertinent History of Current Functional Problem  Acute respiratory failure with hypoxia and hypercapnia; drug abuse, sepsis  -SD        Existing Precautions/Restrictions  fall;oxygen therapy device and L/min  -SD        Risks Reviewed  patient:;increased discomfort  -SD        Benefits Reviewed  patient:;improve function;increase independence;increase strength;increase balance  -SD           Relationship/Environment    Primary Source of Support/Comfort  significant other  -SD        Lives With  significant other  -SD           Resource/Environmental Concerns    Current Living Arrangements  home/apartment/condo  -SD           Home Main Entrance    Number of Stairs, Main Entrance  three  -SD        Stair Railings, Main Entrance  railing on right side (ascending)  -SD           Cognitive Assessment/Interventions    Additional Documentation  Cognitive Assessment/Intervention (Group)  -SD           Cognitive Assessment/Intervention- PT/OT    Orientation Status (Cognition)  oriented x 4  -SD        Follows Commands (Cognition)  verbal cues/prompting required  -SD        Safety Deficit (Cognitive)  safety precautions follow-through/compliance  -SD        Personal Safety Interventions  fall prevention program maintained;gait belt;nonskid shoes/slippers when out of bed  -SD           Safety Issues, Functional Mobility    Safety Issues Affecting Function (Mobility)  safety precautions  follow-through/compliance;insight into deficits/self awareness;awareness of need for assistance  -SD        Impairments Affecting Function (Mobility)  balance;coordination;endurance/activity tolerance;shortness of breath;strength  -SD           Bed Mobility Assessment/Treatment    Comment (Bed Mobility)  patient in chair  -SD           Functional Mobility    Functional Mobility- Ind. Level  minimum assist (75% patient effort)  -SD        Functional Mobility- Device  rolling walker  -SD        Functional Mobility-Distance (Feet)  111  -SD        Functional Mobility- Safety Issues  balance decreased during turns;sequencing ability decreased;step length decreased;weight-shifting ability decreased;supplemental O2  -SD           Transfer Assessment/Treatment    Transfer Assessment/Treatment  sit-stand transfer;stand-sit transfer  -SD           Sit-Stand Transfer    Sit-Stand [place] (Transfers)  minimum assist (75% patient effort)  -SD        Assistive Device (Sit-Stand Transfers)  walker, front-wheeled  -SD           Stand-Sit Transfer    Stand-Sit [place] (Transfers)  minimum assist (75% patient effort)  -SD        Assistive Device (Stand-Sit Transfers)  walker, front-wheeled  -SD           ADL Assessment/Intervention    BADL Assessment/Intervention  bathing;upper body dressing;lower body dressing;grooming;feeding;toileting  -SD           Bathing Assessment/Intervention    Bathing [place] Level  minimum assist (75% patient effort)  -SD           Upper Body Dressing Assessment/Training    Upper Body Dressing [place] Level  contact guard assist  -SD           Lower Body Dressing Assessment/Training    Lower Body Dressing [place] Level  minimum assist (75% patient effort)  -SD           Grooming Assessment/Training    [place] Level (Grooming)  supervision  -SD           Self-Feeding Assessment/Training    [place] Level (Feeding)  set up  -SD           Toileting Assessment/Training     [place] Level (Toileting)  minimum assist (75% patient effort)  -SD           BADL Safety/Performance    Impairments, BADL Safety/Performance  balance;endurance/activity tolerance;coordination;pain;shortness of breath;strength  -SD           General ROM    GENERAL ROM COMMENTS  WFL  -SD           MMT (Manual Muscle Testing)    General MMT Comments  4-/5  -SD           Positioning and Restraints    Pre-Treatment Position  sitting in chair/recliner  -SD        Post Treatment Position  chair  -SD        In Chair  reclined;call light within reach;encouraged to call for assist  -SD           Pain Scale: Numbers Pre/Post-Treatment    Pain Scale: Numbers, Pretreatment  8/10  -SD        Pain Scale: Numbers, Post-Treatment  8/10  -SD        Pain Location  back  -SD        Pain Intervention(s)  Repositioned;Ambulation/increased activity  -SD           Wound 11/08/19 1200 coccyx Pressure Injury    Wound - Properties Group Date first assessed: 11/08/19  -CM Time first assessed: 1200  -CM Location: coccyx  -CM Primary Wound Type: Pressure inj  -CM Stage, Pressure Injury: Stage 1;deep tissue injury  -CM       Wound 11/10/19 2200 Right nostril Pressure Injury    Wound - Properties Group Date first assessed: 11/10/19  -CW Time first assessed: 2200  -CW Present on Hospital Admission: N  -CW Side: Right  -CW Location: nostril  -CW Primary Wound Type: Pressure inj  -CW Stage, Pressure Injury: medical device related  -CW       Coping    Observed Emotional State  calm;cooperative  -SD        Verbalized Emotional State  acceptance  -SD           Plan of Care Review    Plan of Care Reviewed With  patient  -SD           Clinical Impression (OT)    Date of Referral to OT  11/09/19  -SD        OT Diagnosis  ADL decline  -SD        Patient/Family Goals Statement (OT Eval)  Return home  -SD        Criteria for Skilled Therapeutic Interventions Met (OT Eval)  yes  -SD        Rehab Potential (OT Eval)  good, to achieve stated therapy goals   -SD        Therapy Frequency (OT Eval)  3 times/wk 5 times if indicated  -SD        Care Plan Review (OT)  evaluation/treatment results reviewed  -SD        Anticipated Discharge Disposition (OT)  anticipate therapy at next level of care  -SD           Vital Signs    Pre SpO2 (%)  97  -SD        O2 Delivery Pre Treatment  supplemental O2 8L  -SD        Intra SpO2 (%)  91  -SD        O2 Delivery Intra Treatment  supplemental O2 10L  -SD        Post SpO2 (%)  95  -SD        O2 Delivery Post Treatment  supplemental O2 8L  -SD           Planned OT Interventions    Planned Therapy Interventions (OT Eval)  activity tolerance training;adaptive equipment training;BADL retraining;patient/caregiver education/training;strengthening exercise;transfer/mobility retraining  -SD           OT Goals    Transfer Goal Selection (OT)  transfer, OT goal 1  -SD        Dressing Goal Selection (OT)  dressing, OT goal 1  -SD        Toileting Goal Selection (OT)  toileting, OT goal 1  -SD        Strength Goal Selection (OT)  strength, OT goal 1  -SD        Functional Mobility Goal Selection (OT)  functional mobility, OT goal 1  -SD        Additional Documentation  Strength Goal Selection (OT) (Row);Functional Mobility Selection (OT) (Row)  -SD           Transfer Goal 1 (OT)    Activity/Assistive Device (Transfer Goal 1, OT)  sit-to-stand/stand-to-sit;walker, rolling  -SD        [place] Level/Cues Needed (Transfer Goal 1, OT)  standby assist  -SD        Time Frame (Transfer Goal 1, OT)  2 weeks  -SD        Progress/Outcome (Transfer Goal 1, OT)  goal ongoing  -SD           Dressing Goal 1 (OT)    Activity/Assistive Device (Dressing Goal 1, OT)  lower body dressing  -SD        [place]/Cues Needed (Dressing Goal 1, OT)  standby assist  -SD        Time Frame (Dressing Goal 1, OT)  2 weeks  -SD        Progress/Outcome (Dressing Goal 1, OT)  goal ongoing  -SD           Toileting Goal 1 (OT)    Activity/Device (Toileting Goal 1, OT)   toileting skills, all;commode  -SD        [place] Level/Cues Needed (Toileting Goal 1, OT)  standby assist  -SD        Time Frame (Toileting Goal 1, OT)  2 weeks  -SD        Progress/Outcome (Toileting Goal 1, OT)  goal ongoing  -SD           Strength Goal 1 (OT)    Strength Goal 1 (OT)  patient to perform UB ther ex as tolerated  -SD        Time Frame (Strength Goal 1, OT)  long term goal (LTG)  -SD        Progress/Outcome (Strength Goal 1, OT)  goal ongoing  -SD           Functional Mobility Goal 1 (OT)    Activity/Assistive Device (Functional Mobility Goal 1, OT)  walker, rolling  -SD        [place] Level/Cues Needed (Functional Mobility Goal 1, OT)  standby assist;contact guard assist  -SD        Distance Goal 1 (Functional Mobility, OT)  200  -SD        Time Frame (Functional Mobility Goal 1, OT)  long term goal (LTG)  -SD        Progress/Outcome (Functional Mobility Goal 1, OT)  goal ongoing  -SD           Living Environment    Home Accessibility  stairs to enter home  -SD          User Key  (r) = Recorded By, (t) = Taken By, (c) = Cosigned By    Initials Name Effective Dates    CW Kesha Centeno, RN 05/01/17 -     SD Fallon Bae, OT 03/07/18 -     Sania Pratt RN 10/16/17 -          Occupational Therapy Education     Title: PT OT SLP Therapies (In Progress)     Topic: Occupational Therapy (In Progress)     Point: ADL training (Done)     Description: Instruct learner(s) on proper safety adaptation and remediation techniques during self care or transfers.   Instruct in proper use of assistive devices.    Learning Progress Summary           Patient Acceptance, E,TB, VU by SD at 11/11/2019 11:50 AM    Comment:  Benefit of OT; OT POC                               User Key     Initials Effective Dates Name Provider Type Discipline    SD 03/07/18 -  Fallon Bae OT Occupational Therapist OT                  OT Recommendation and Plan  Outcome Summary/Treatment Plan (OT)  Anticipated  Discharge Disposition (OT): anticipate therapy at next level of care  Planned Therapy Interventions (OT Eval): activity tolerance training, adaptive equipment training, BADL retraining, patient/caregiver education/training, strengthening exercise, transfer/mobility retraining  Therapy Frequency (OT Eval): 3 times/wk(5 times if indicated)  Plan of Care Review  Plan of Care Reviewed With: patient  Plan of Care Reviewed With: patient  Outcome Summary: OT eval completed. Patient presents deficits in strength, endurance, balance, mobility and ADL performance. Patient is expected to benefit from continued OT services prior to DC.     Outcome Measures     Row Name 11/11/19 0942             How much help from another is currently needed...    Putting on and taking off regular lower body clothing?  3  -SD      Bathing (including washing, rinsing, and drying)  3  -SD      Toileting (which includes using toilet bed pan or urinal)  3  -SD      Putting on and taking off regular upper body clothing  3  -SD      Taking care of personal grooming (such as brushing teeth)  3  -SD      Eating meals  4  -SD      AM-PAC 6 Clicks Score (OT)  19  -SD         Functional Assessment    Outcome Measure Options  AM-PAC 6 Clicks Daily Activity (OT)  -SD        User Key  (r) = Recorded By, (t) = Taken By, (c) = Cosigned By    Initials Name Provider Type    Fallon Peoples OT Occupational Therapist          Time Calculation:   Time Calculation- OT     Row Name 11/11/19 1151             Time Calculation- OT    OT Start Time  0942  -SD      OT Received On  11/11/19  -SD      OT Goal Re-Cert Due Date  11/21/19  -SD        User Key  (r) = Recorded By, (t) = Taken By, (c) = Cosigned By    Initials Name Provider Type    Fallon Peoples OT Occupational Therapist        Therapy Charges for Today     Code Description Service Date Service Provider Modifiers Qty    35170920200  OT EVAL MOD COMPLEXITY 2 11/11/2019 Fallon Bae OT GO 1                Fallon Bae, OT  11/11/2019

## 2019-11-11 NOTE — PLAN OF CARE
Problem: Patient Care Overview  Goal: Plan of Care Review  Outcome: Ongoing (interventions implemented as appropriate)   11/11/19 1296   Plan of Care Review   Progress no change   Coping/Psychosocial   Plan of Care Reviewed With patient   OTHER   Outcome Summary OT eval completed. Patient presents deficits in strength, endurance, balance, mobility and ADL performance. Patient is expected to benefit from continued OT services prior to DC.

## 2019-11-11 NOTE — THERAPY RE-EVALUATION
Acute Care - Speech Language Pathology   Swallow Re-Evaluation  [facility]     Patient Name: Eddie Prieto  : 1975  MRN: [id number]  Today's Date: 2019  Onset of Illness/Injury or Date of Surgery: 10/30/19     Referring Physician: Dr. Tamayo      Admit Date: 10/30/2019    Visit Dx:     ICD-10-CM ICD-9-CM   1. Acute respiratory failure with hypoxia and hypercapnia (CMS/HCC) J96.01 518.81    J96.02    2. Elevated procalcitonin R79.89 790.99   3. Aspiration pneumonia, unspecified aspiration pneumonia type, unspecified laterality, unspecified part of lung (CMS/[facility]) J69.0 507.0   4. Polysubstance overdose, undetermined intent, initial encounter T50.904A 977.9     E980.5   5. Sepsis, due to unspecified organism, unspecified whether acute organ dysfunction present (CMS/[facility]) A41.9 038.9     995.91   6. Impaired mobility and ADLs Z74.09 799.89     Patient Active Problem List   Diagnosis   • Acute respiratory failure with hypoxia and hypercapnia (CMS/HCC)   • Accidental drug overdose   • Toxic encephalopathy   • Sepsis (CMS/HCC)   • Aspiration pneumonia of both lower lobes due to vomit (CMS/HCC)   • Tobacco dependence   • Drug abuse (CMS/HCC)   • Hyperglycemia     Past Medical History:   Diagnosis Date   • Impaired functional mobility, balance, gait, and endurance    • Lung disease      Past Surgical History:   Procedure Laterality Date   • BRONCHOSCOPY N/A 2019    Procedure: BRONCHOSCOPY at bedside with washings;  Surgeon: Bria Tamayo MD;  Location: [facility];  Service: Pulmonary   • LEG SURGERY      broken bone   • NECK SURGERY          SWALLOW EVALUATION (last 72 hours)      SLP Adult Swallow Evaluation     Row Name 19 1207                   Rehab Evaluation    Document Type  re-evaluation  -TM        Subjective Information  no complaints  -TM        Patient Observations  alert;cooperative;agree to therapy  -TM        Patient/Family Observations  no family present  -TM        Patient  Effort  good  -TM           General Information    Patient Profile Reviewed  yes  -TM        Current Method of Nutrition  soft textures;honey-thick liquids  -TM        Prior Level of Function-Communication  WFL  -TM        Plans/Goals Discussed with  patient;other (see comments) RN  -TM        Patient's Goals for Discharge  return to regular diet  -TM           Pain Assessment    Additional Documentation  Pain Scale: Numbers Pre/Post-Treatment (Group)  -TM           Pain Scale: Numbers Pre/Post-Treatment    Pain Scale: Numbers, Pretreatment  0/10 - no pain  -TM        Pain Scale: Numbers, Post-Treatment  0/10 - no pain  -TM           Oral Motor and Function    Oral Lesions or Structural Abnormalities and/or variants  none identified  -TM        Dentition Assessment  edentulous, does not have dentures  -TM        Secretion Management  WNL/WFL  -TM        Mucosal Quality  moist, healthy  -TM        Volitional Cough  reduced respiratory support  -TM           Oral Musculature and Cranial Nerve Assessment    Oral Motor General Assessment  generalized oral motor weakness  -TM           General Eating/Swallowing Observations    Respiratory Support Currently in Use  nasal cannula  -TM        Eating/Swallowing Skills  self-fed;fed by SLP  -TM        Positioning During Eating  other (see comments) seated at edge of bed  -TM        Utensils Used  spoon;cup;straw  -TM        Consistencies Trialed  soft textures;thin liquids  -TM        Pre SpO2 (%)  95  -TM        Post SpO2 (%)  95  -TM           Respiratory    Respiratory Status  WFL;during swallowing/eating  -TM           Clinical Swallow Eval    Oral Prep Phase  WFL considering edentulous  -TM        Oral Transit  WFL  -TM        Oral Residue  WFL  -TM        Pharyngeal Phase  no overt signs/symptoms of pharyngeal impairment  -TM        Clinical Swallow Evaluation Summary  Oral phase remarkable only for edentulous.  No overt s/s aspiration or other pharyngeal phase  dysphagia with multiple trials of thin via open cup and straw.  Pt.'s awareness and strength have both improved since he was evaluated initially on Friday, 11/8.  Recommend:  1. soft diet with thin liq as finn,  2. meds as finn,  3. aspiration precautions.   -TM           Oral Prep Concerns    Oral Prep Concerns  -- edentulous  -TM           Clinical Impression    Swallow Criteria for Skilled Therapeutic Interventions Met  no problems identified which require skilled intervention  -TM           Recommendations    Therapy Frequency (Swallow)  evaluation only  -TM        SLP Diet Recommendation  soft textures;thin liquids  -TM        Recommended Precautions and Strategies  upright posture during/after eating  -TM        SLP Rec. for Method of Medication Administration  meds whole;with pudding or applesauce;with thin liquids  -TM        Monitor for Signs of Aspiration  notify SLP if any concerns  -TM        Anticipated Dischage Disposition  unknown  -TM           Swallow Goals (SLP)    Oral Nutrition/Hydration Goal Selection (SLP)  oral nutrition/hydration, SLP goal 1  -TM           Oral Nutrition/Hydration Goal 1 (SLP)    Oral Nutrition/Hydration Goal 1, SLP  tolerate soft with thin liq  -TM        Time Frame (Oral Nutrition/Hydration Goal 1, SLP)  1 day  -TM        Progress/Outcomes (Oral Nutrition/Hydration Goal 1, SLP)  goal revised this date  -          User Key  (r) = Recorded By, (t) = Taken By, (c) = Cosigned By    Initials Name Effective Dates     Aliya Caban 03/07/18 -           EDUCATION  The patient has been educated in the following areas:   Dysphagia (Swallowing Impairment) Modified Diet Instruction.    SLP Recommendation and Plan     SLP Diet Recommendation: soft textures, thin liquids  Recommended Precautions and Strategies: upright posture during/after eating  SLP Rec. for Method of Medication Administration: meds whole, with pudding or applesauce, with thin liquids     Monitor for Signs of  Aspiration: notify SLP if any concerns     Swallow Criteria for Skilled Therapeutic Interventions Met: no problems identified which require skilled intervention  Anticipated Dischage Disposition: unknown     Therapy Frequency (Swallow): evaluation only          Plan of Care Reviewed With: patient  Progress: improving  Outcome Summary: Re-evaluation of swallow completed. Oral phase remarkable only for edentulous.  No overt s/s aspiration or other pharyngeal phase dysphagia.  Pt.'s strength has improved since his evaluation on Friday 11/8.  Recommend:  1. soft diet with thin liq as finn,  2. meds as finn,  3. aspiration precautions.      SLP GOALS     Row Name 11/11/19 1207             Oral Nutrition/Hydration Goal 1 (SLP)    Oral Nutrition/Hydration Goal 1, SLP  tolerate soft with thin liq  -TM      Time Frame (Oral Nutrition/Hydration Goal 1, SLP)  1 day  -      Progress/Outcomes (Oral Nutrition/Hydration Goal 1, SLP)  goal revised this date  -        User Key  (r) = Recorded By, (t) = Taken By, (c) = Cosigned By    Initials Name Provider Type     Aliya Caban Speech and Language Pathologist             Time Calculation:   Time Calculation- SLP     Row Name 11/11/19 1230             Time Calculation- SLP    SLP Start Time  1207  -TM      SLP Stop Time  1218  -TM      SLP Time Calculation (min)  11 min  -TM      SLP Received On  11/11/19  -        User Key  (r) = Recorded By, (t) = Taken By, (c) = Cosigned By    Initials Name Provider Type     Aliya Caban Speech and Language Pathologist          Therapy Charges for Today     Code Description Service Date Service Provider Modifiers Qty    [id number] HC ST TREATMENT SWALLOW 1 11/11/2019 Aliya Caban GN 1               Aliya Caban  11/11/2019

## 2019-11-11 NOTE — PROGRESS NOTES
"  CC: Acute Respiratory Failure.     S: Extubated on 2019.  OOB in a chair today.  Was able to communicate & verbalize. Continues to have productive cough.  Says that the frequency is mild to moderate in intensity and better than 2 days ago.    ROS: Positive for some back pain, cough.  Negative for diarrhea or significant fever.    O:Vital signs reviewed.    /97   Pulse 103   Temp 99 °F (37.2 °C) (Oral)   Resp 25   Ht 170.2 cm (67.01\")   Wt 64.2 kg (141 lb 9.6 oz)   SpO2 93%   BMI 22.17 kg/m²     Temp (24hrs), Av.6 °F (37 °C), Min:98.2 °F (36.8 °C), Max:99 °F (37.2 °C)      I & Os reviewed.   Intake/Output       11/10/19 0700 - 19 0659 19 0700 - 19 0659    Intake (ml) 2100 480    Output (ml) 850 250    Net (ml) 1250 230    Last Weight  64.2 kg (141 lb 9.6 oz)  --          General/Constitutional: Appears in mild distress.  Eyes: PERRL.   Neck: Supple without JVD. No obvious masses noted.     Cardiovascular: S1 + S2. Regular.    Lungs/Respiratory: Coarse air entry bilaterally with bilateral, right greater than left crackles noted.  Respiratory effort was somewhat labored with mild tachypnea noted.  GI/Abdomen: Soft. Bowel sounds positive. No organomegaly noted.  Musculoskeletal/Extremities: Trace edema noted. Gait could not be assessed at this time, as the patient was laying in bed.   Neurologic: Alert and awake.  Was able to follow commands.  Was able to sit up to request.  Appears somewhat weak.    Psych: Appears appropriate overall.     Skin: Diffuse scattered ulcerations over upper and lower extremities seen.     Labs: Reviewed.   Results from last 7 days   Lab Units 19  0457 11/10/19  0444 19  0406   SODIUM mmol/L 142 146* 144   POTASSIUM mmol/L 3.5 3.0* 3.1*   CHLORIDE mmol/L 101 105 103   CO2 mmol/L 30.2* 29.4* 28.4   BUN mg/dL 25* 25* 23*   CREATININE mg/dL 0.53* 0.53* 0.57*   CALCIUM mg/dL 9.0 8.8 8.7   BILIRUBIN mg/dL 0.5 0.5 0.7   ALK PHOS U/L 166* 194* " 242*   ALT (SGPT) U/L 81* 63* 30   AST (SGOT) U/L 81* 88* 56*   GLUCOSE mg/dL 110* 136* 135*             Results from last 7 days   Lab Units 11/11/19  0457 11/10/19  0444 11/09/19  0406 11/08/19  0420 11/07/19  0404 11/06/19  0407 11/05/19  0439   WBC 10*3/mm3 16.95* 16.88* 22.17* 21.65* 10.33 8.91 9.84   NEUTROPHIL % %  --   --  79.2* 84.5* 69.7 57.7 64.0   HEMOGLOBIN g/dL 11.1* 10.8* 10.6* 10.2* 8.3* 8.1* 8.6*   HEMATOCRIT % 36.2* 35.7* 34.2* 32.3* 27.4* 26.8* 27.4*   PLATELETS 10*3/mm3 603* 589* 546* 452* 240 219 231             Lab Results   Component Value Date    PROCALCITO 0.39 (H) 11/11/2019    PROCALCITO 1.16 (H) 11/08/2019    PROCALCITO 2.72 (H) 11/05/2019       Lab Results   Component Value Date    PROBNP 385.2 10/30/2019              Micro: As of November 11, 2019   Lab Results   Component Value Date    RESPCX Moderate growth (3+) Citrobacter koseri (A) 11/06/2019    RESPCX Light growth (2+) Normal Respiratory Dunia 11/06/2019     Lab Results   Component Value Date    BLOODCX No growth at 4 days 11/07/2019    BLOODCX No growth at 4 days 11/07/2019       Lab Results   Component Value Date    MRSACX  10/31/2019     No Methicillin Resistant Staphylococcus aureus isolated       ABG: Reviewed.  Lab Results   Component Value Date    PHART 7.382 11/07/2019    KTO5GJW 50.5 (H) 11/07/2019    PO2ART 77.4 11/07/2019    HGBBG 10.8 (L) 11/07/2019    X4XDNELA 95.6 11/07/2019    CARBOXYHGB 0.9 11/07/2019       CXRay: Latest imaging study was reviewed personally.   Imaging Results (Last 24 Hours)     Procedure Component Value Units Date/Time    XR Chest 1 View [[id number]] Collected:  11/11/19 0803     Updated:  11/11/19 0805    Narrative:       PROCEDURE: XR CHEST 1 VW-     HISTORY: Resp Failure.; J96.01-Acute respiratory failure with hypoxia;  J96.02-Acute respiratory failure with hypercapnia; R79.89-Other  specified abnormal findings of blood chemistry; J69.0-Pneumonitis due to  inhalation of food and vomit;  "T50.904A-Poisoning by unspecified drugs,  medicaments and biological substances, undetermined, initial encounter;  A41.9-Sepsis, unspecified organism     COMPARISON: 11/09/2019.     FINDINGS: The heart is normal in size. The mediastinum is unremarkable.  There has been slight interval improvement in patient's bilateral  airspace disease. There is no pneumothorax.  There are no acute osseous  abnormalities.       Impression:       Slight interval improvement in patient's bilateral airspace  disease.     Continued followup is recommended.     This report was finalized on 11/11/2019 8:03 AM by Chris Patricio M.D..          Assessment & Recommendations/Plan:   1.  Acute Respiratory Failure  Status post bronchoscopy.  Extubated on 11/7/2019.  Continue high-dose oxygen.  Chest x-ray remains significantly abnormal.  We will repeat x-ray in the morning as well.    2.  Left sided ?aspiration? Pneumonia.   Procalcitonin level almost normal.  Bronchial washing showing Citrobacter.  Respiratory cultures in the past revealed as Acinetobacter and Citrobacter.  Completed ceftriaxone.    3.  Drug overdose.  Was advised to quit using drugs.  He said \"I am done with them\".  Toxicology screen was positive for multiple agents.  Continue morphine as needed.    4.  Anemia.  Was likely dilutional.   Improved with diuresis.    5.  Oral candidiasis   On nystatin swish and swallow.    6.  Continued fever.  No more antibiotics.  No fevers noted.     7.  Hypertension  Continues to be on medication.    8.  Leukocytosis  Blood cultures have remained negative.  Sputum cultures have revealed organism sensitive to current antibiotics.  Decreasing, compared to before.    9.  Nutrition  Currently on puréed diet/mechanical soft diet.    10.  Deconditioning  Continue physical therapy and occupational therapy.    11.  Smoking  Will start the patient on nicotine patch.    We have reviewed patient's current orders and changes, if any, have been " suggested to primary care team. Plan was also discussed with nursing staff, as necessary.     This document was electronically signed by Bria Tamayo MD on 11/11/19 at 11:09 AM      Dictated utilizing Dragon dictation.

## 2019-11-12 VITALS
HEIGHT: 67 IN | SYSTOLIC BLOOD PRESSURE: 136 MMHG | DIASTOLIC BLOOD PRESSURE: 73 MMHG | OXYGEN SATURATION: 97 % | BODY MASS INDEX: 21.25 KG/M2 | WEIGHT: 135.4 LBS | HEART RATE: 106 BPM | TEMPERATURE: 98.6 F | RESPIRATION RATE: 18 BRPM

## 2019-11-12 LAB
BACTERIA SPEC AEROBE CULT: NORMAL
BACTERIA SPEC AEROBE CULT: NORMAL

## 2019-11-12 PROCEDURE — 25010000002 ENOXAPARIN PER 10 MG: Performed by: INTERNAL MEDICINE

## 2019-11-12 PROCEDURE — 99239 HOSP IP/OBS DSCHRG MGMT >30: CPT | Performed by: INTERNAL MEDICINE

## 2019-11-12 PROCEDURE — 94618 PULMONARY STRESS TESTING: CPT

## 2019-11-12 PROCEDURE — 94799 UNLISTED PULMONARY SVC/PX: CPT

## 2019-11-12 PROCEDURE — 25010000002 MORPHINE PER 10 MG: Performed by: INTERNAL MEDICINE

## 2019-11-12 PROCEDURE — 97535 SELF CARE MNGMENT TRAINING: CPT

## 2019-11-12 PROCEDURE — 99232 SBSQ HOSP IP/OBS MODERATE 35: CPT | Performed by: INTERNAL MEDICINE

## 2019-11-12 RX ORDER — IPRATROPIUM BROMIDE AND ALBUTEROL SULFATE 2.5; .5 MG/3ML; MG/3ML
3 SOLUTION RESPIRATORY (INHALATION) EVERY 6 HOURS PRN
Qty: 360 ML | Refills: 0 | Status: SHIPPED | OUTPATIENT
Start: 2019-11-12 | End: 2019-12-12

## 2019-11-12 RX ORDER — AMLODIPINE BESYLATE 10 MG/1
10 TABLET ORAL
Qty: 30 TABLET | Refills: 0 | Status: SHIPPED | OUTPATIENT
Start: 2019-11-13

## 2019-11-12 RX ORDER — METOPROLOL TARTRATE 50 MG/1
50 TABLET, FILM COATED ORAL EVERY 12 HOURS SCHEDULED
Qty: 60 TABLET | Refills: 0 | Status: SHIPPED | OUTPATIENT
Start: 2019-11-12

## 2019-11-12 RX ORDER — NICOTINE 21 MG/24HR
1 PATCH, TRANSDERMAL 24 HOURS TRANSDERMAL
Qty: 28 PATCH | Refills: 0 | Status: SHIPPED | OUTPATIENT
Start: 2019-11-13 | End: 2019-12-13

## 2019-11-12 RX ADMIN — AMLODIPINE BESYLATE 10 MG: 5 TABLET ORAL at 08:52

## 2019-11-12 RX ADMIN — Medication 1 CAPSULE: at 08:52

## 2019-11-12 RX ADMIN — NYSTATIN 500000 UNITS: 500000 SUSPENSION ORAL at 08:52

## 2019-11-12 RX ADMIN — IPRATROPIUM BROMIDE AND ALBUTEROL SULFATE 3 ML: .5; 3 SOLUTION RESPIRATORY (INHALATION) at 00:40

## 2019-11-12 RX ADMIN — IPRATROPIUM BROMIDE AND ALBUTEROL SULFATE 3 ML: .5; 3 SOLUTION RESPIRATORY (INHALATION) at 07:22

## 2019-11-12 RX ADMIN — METOPROLOL TARTRATE 50 MG: 50 TABLET, FILM COATED ORAL at 08:52

## 2019-11-12 RX ADMIN — MORPHINE SULFATE 4 MG: 4 INJECTION INTRAVENOUS at 06:41

## 2019-11-12 RX ADMIN — NICOTINE 1 PATCH: 21 PATCH TRANSDERMAL at 08:52

## 2019-11-12 RX ADMIN — POTASSIUM CHLORIDE 40 MEQ: 750 CAPSULE, EXTENDED RELEASE ORAL at 08:52

## 2019-11-12 RX ADMIN — MORPHINE SULFATE 4 MG: 4 INJECTION INTRAVENOUS at 10:56

## 2019-11-12 RX ADMIN — IPRATROPIUM BROMIDE AND ALBUTEROL SULFATE 3 ML: .5; 3 SOLUTION RESPIRATORY (INHALATION) at 12:49

## 2019-11-12 RX ADMIN — ENOXAPARIN SODIUM 40 MG: 40 INJECTION SUBCUTANEOUS at 00:11

## 2019-11-12 NOTE — DISCHARGE SUMMARY
[facility]   DISCHARGE SUMMARY      Name:  Eddie Prieto   Age:  44 y.o.  Sex:  male  :  1975  MRN:  [id number]   Visit Number:  [id number]    Admission Date:  10/30/2019  Date of Discharge:  2019  Primary Care Physician:  Shivani Canales APRN    Important issues to note:    1.  Patient has completed a course of antibiotic therapy in the hospital stay for his pneumonia.  2.  Patient will benefit from outpatient evaluation for COPD.  3.  Patient has been arranged home oxygen at 2 L/min at rest and 3 L/min on exertion.  4.  Repeat chest x-ray in 4 weeks and follow-up with primary care physician.  5.  Patient has been strongly advised against smoking and recreational drug use.  6.  New medications started during this admission for hypertension: Metoprolol and amlodipine.    Discharge Diagnoses:     1.  Acute hypoxic and hypercapnic respiratory failure secondary to #2, present on admission status post mechanical ventilation from 10/30/2019 to 2019.  2.  Acute drug overdose with opiates and methamphetamine, present on admission.  3.  Acute toxic encephalopathy secondary to #2, present on admission, resolved.  4.  Septic shock secondary to #5, present on admission, resolved.  5.  Bilateral lower lobe aspiration pneumonia secondary to Acinetobacter and Citrobacter, present on admission.  6.  Hyperglycemia, likely secondary to borderline diabetes, present on admission (hemoglobin A1c is 6).  7.  Hypertensive urgency, present on admission with subsequent hypotension due to sepsis.  8.  Acute kidney injury, improved.  9.  Nicotine dependence.  10.  Recreational drug abuse.  11.  Multiple skin ulcers, present on admission.  12.  Suspected underlying COPD.    Problem List:       Acute respiratory failure with hypoxia and hypercapnia (CMS/HCC)    Accidental drug overdose    Toxic encephalopathy    Sepsis (CMS/HCC)    Aspiration pneumonia of both lower lobes due to vomit  (CMS/[facility])    Tobacco dependence    Drug abuse (CMS/[facility])    Hyperglycemia    Presenting Problem:    Acute respiratory failure with hypoxia and hypercapnia (CMS/[facility]) [J96.01, J96.02]     Consults:     Consults     Date and Time Order Name Status Description    11/1/2019 0826 Inpatient Nephrology Consult Completed     10/30/2019 1168 Inpatient Pulmonology Consult Completed         Consulting Physician(s)     Provider Relationship Specialty    Bria Tamayo MD Consulting Physician Pulmonary Disease        Procedures Performed:    1.  Mechanical ventilation from 10/30/2019 to 11/7/2019.  2.  BRONCHOSCOPY at bedside with washings on 11/6/2019.  3.  Placement of left internal jugular central line and subsequent removal.    History of presenting illness:    This is a 44-year-old male with history of drug abuse was brought to the emergency room by EMS after they were called by someone who was driving the patient around stating that the patient became unresponsive.  The history is obtained from the emergency room physician and the medical record.  The patient was given 2 mg of IV Narcan by EMS with some improvement in his mental status but he was agitated and confused.  When he came to the emergency room he was ashen looking, diaphoretic and cyanotic with minimal respiratory effort.  Patient was given 2 more milligram of Narcan while getting Ambu bag ventilation with minimal response.  His oxygen saturation was 36% in the emergency room and he was subsequently intubated in the emergency room.  Apparently, a lot of copious secretion came out of the endotracheal tube likely suggestive of aspirated vomitus.  He was placed on the mechanical ventilation and was started on propofol drip.       In the emergency room, initial vitals noted: Temperature 96.3, pulse 92, blood pressure 166/100 and pulse ox saturation of 36% with a good plethysmograph.  Blood work done in the emergency room including CMP and CBC was unremarkable  except for a glucose of 315 and a white count of 21.8.  Lactic acid was 6.6 with procalcitonin of 8.3.  Troponin and BNP levels are within normal limits.  ABG done in the emergency room showed a pH of 7.14, PCO2 of 75 and PO2 of 166 with a bicarb of 26 after being on a ventilator with 100% FiO2.  Chest x-ray showed bilateral diffuse infiltrates.  CT of the head was negative for any acute abnormalities.  CT of the chest showed bilateral mid and lower zone pneumonia.  He was given 2 L of normal saline boluses and IV antibiotic therapy with Zosyn and vancomycin.  He is currently being admitted to the medical ICU for further evaluation and management.     Patient was recently in the emergency room on 10/19/2019 with generalized pain and skin lesions.  He was apparently discharged with chlorhexidine and clindamycin and was advised to follow-up with his primary care physician in 1 week.    Hospital Course:    Mr. Prieto was admitted to the medical ICU and was continued on mechanical ventilation with propofol.  He had some initial twitching of his limbs concerning for anoxic encephalopathy.  Patient however was noted to be alert and responding during wake up trials and he did not have any further episodes of twitching.  He was maintained on broad-spectrum IV antibiotic therapy with Zosyn and vancomycin for aspiration pneumonia.  He was seen by Dr. Tamayo from pulmonology for management of his respiratory failure.  A left internal jugular central line was placed for IV access.  He required short-term vasopressor therapy.  Patient also underwent bronchoscopy with removal of copious secretions from his lungs on 11/6/2019.  He had intermittent fever during the hospital stay but his blood cultures have been negative.  His initial sputum culture grew Acinetobacter and Citrobacter along with Candida albicans.  Repeat blood cultures remained negative.  His repeat sputum culture grew Citrobacter sensitive to Rocephin and he was  switched to Rocephin on 11/7/2019.  Patient had multiple attempts at weaning from the ventilator initially but was eventually successfully extubated on 11/7/2019.  He was seen by speech therapy and was placed on puréed diet.  He did have generalized weakness and was started on physical therapy.  Following extubation, patient required high flow oxygen which was subsequently tapered down.  He also was noted to have elevated blood pressures requiring IV metoprolol.  This was subsequently switched to oral metoprolol with addition of amlodipine.  He was transferred out of the ICU on 11/11/2019.    Patient was seen by nephrology for decreased urine output.  He initially had acute kidney injury but it subsequently improved with conservative management.  Patient continued to improve with regards to his oxygen requirement and is currently down to 2 L/min with nasal cannula saturating in the mid 90s.  He does have some coughing and repeat x-ray shows improvement in his infiltrates but they may require another 4 weeks to completely resolve.  Patient likely has underlying COPD and has been strongly advised to discontinue smoking.  He will be prescribed nicotine patches at discharge.  He has completed a course of antibiotic therapy and he does not require further antibiotics at home.  He has been strongly advised against recreational drug use.  He did have a walking oximetry prior to discharge and does qualify for home oxygen which will be arranged.  He states that he already has a nebulizer machine and will be given prescription for DuoNebs.  He is advised to follow-up with his primary care provider in 1 week.  He will benefit from repeat chest x-ray in 4 weeks to document resolution of his pulmonary infiltrates.  Patient declined home health.  Patient's discharge condition and plan was discussed with his girlfriend who is at the bedside.    Vital Signs:    Temp:  [98.3 °F (36.8 °C)-99.7 °F (37.6 °C)] 98.9 °F (37.2 °C)  Heart  Rate:  [] 100  Resp:  [18-20] 18  BP: (124-131)/(55-81) 127/65    Physical Exam:    General Appearance:  Alert and cooperative, not in any acute distress.   Head:  Atraumatic and normocephalic, without obvious abnormality.   Eyes:          PERRLA, conjunctivae and sclerae normal, no Icterus. No pallor. Extraocular movements are within normal limits.   Ears:  Ears appear intact with no abnormalities noted.   Throat: No oral lesions, no thrush, oral mucosa moist.   Neck: Supple, trachea midline, no thyromegaly, no carotid bruit.   Back:   No kyphoscoliosis present. No tenderness to palpation,   range of motion normal.   Lungs:   Chest shape is normal. Breath sounds heard bilaterally equally.  No crackles or wheezing. No Pleural rub or bronchial breathing.   Heart:  Normal S1 and S2, no murmur, no gallop, no rub. No JVD.   Abdomen:   Normal bowel sounds, no masses, no organomegaly. Soft, non-tender, non-distended, no guarding, no rebound tenderness. Midline surgical scar noted.   Extremities: Moves all extremities well, no edema, no cyanosis, no clubbing. Multiple skin ulcers with various stages of healing and scabs noted throughout the body including torso, abdomen and all 4 extremities.  Multiple tattoos noted on the skin.   Pulses: Pulses palpable and equal bilaterally.   Skin: No bleeding or rash.   Neurologic: Alert and oriented x 3. Moves all four limbs equally. No tremors. No facial asymetry.     Pertinent Lab Results:     Results from last 7 days   Lab Units 11/11/19  0457 11/10/19  0444 11/09/19  0406   SODIUM mmol/L 142 146* 144   POTASSIUM mmol/L 3.5 3.0* 3.1*   CHLORIDE mmol/L 101 105 103   CO2 mmol/L 30.2* 29.4* 28.4   BUN mg/dL 25* 25* 23*   CREATININE mg/dL 0.53* 0.53* 0.57*   CALCIUM mg/dL 9.0 8.8 8.7   BILIRUBIN mg/dL 0.5 0.5 0.7   ALK PHOS U/L 166* 194* 242*   ALT (SGPT) U/L 81* 63* 30   AST (SGOT) U/L 81* 88* 56*   GLUCOSE mg/dL 110* 136* 135*     Results from last 7 days   Lab Units  11/11/19  0457 11/10/19  0444 11/09/19  0406   WBC 10*3/mm3 16.95* 16.88* 22.17*   HEMOGLOBIN g/dL 11.1* 10.8* 10.6*   HEMATOCRIT % 36.2* 35.7* 34.2*   PLATELETS 10*3/mm3 603* 589* 546*       Results from last 7 days   Lab Units 11/07/19  2242   PH, ARTERIAL pH units 7.382   PO2 ART mm Hg 77.4   PCO2, ARTERIAL mm Hg 50.5*   HCO3 ART mmol/L 30.0*     Pain Management Panel     Pain Management Panel Latest Ref Rng & Units 10/30/2019    AMPHETAMINES SCREEN, URINE Negative Positive(A)    BARBITURATES SCREEN Negative Negative    BENZODIAZEPINE SCREEN, URINE Negative Negative    BUPRENORPHINEUR Negative Negative    COCAINE SCREEN, URINE Negative Negative    METHADONE SCREEN, URINE Negative Negative    METHAMPHETAMINEUR Negative Positive(A)        Results from last 7 days   Lab Units 11/07/19  0518   BLOODCX  No growth at 5 days  No growth at 5 days     Pertinent Radiology Results:    Imaging Results (All)     Procedure Component Value Units Date/Time    XR Chest 1 View [[id number]] Collected:  11/11/19 0803     Updated:  11/11/19 0805    Narrative:       PROCEDURE: XR CHEST 1 VW-     HISTORY: Resp Failure.; J96.01-Acute respiratory failure with hypoxia;  J96.02-Acute respiratory failure with hypercapnia; R79.89-Other  specified abnormal findings of blood chemistry; J69.0-Pneumonitis due to  inhalation of food and vomit; T50.904A-Poisoning by unspecified drugs,  medicaments and biological substances, undetermined, initial encounter;  A41.9-Sepsis, unspecified organism     COMPARISON: 11/09/2019.     FINDINGS: The heart is normal in size. The mediastinum is unremarkable.  There has been slight interval improvement in patient's bilateral  airspace disease. There is no pneumothorax.  There are no acute osseous  abnormalities.       Impression:       Slight interval improvement in patient's bilateral airspace  disease.     Continued followup is recommended.     This report was finalized on 11/11/2019 8:03 AM by Chris  GER Patricio.    XR Chest 1 View [[id number]] Collected:  11/09/19 0856     Updated:  11/09/19 0900    Narrative:       PROCEDURE: XR CHEST 1 VW-     HISTORY: Resp Failure.; J96.01-Acute respiratory failure with hypoxia;  J96.02-Acute respiratory failure with hypercapnia; R79.89-Other  specified abnormal findings of blood chemistry; J69.0-Pneumonitis due to  inhalation of food and vomit; T50.904A-Poisoning by unspecified drugs,  medicaments and biological substances, undetermined, initial encounter;  A41.9-Sepsis, unspecified organism     COMPARISON: 11/08/2019.     FINDINGS: A left internal jugular central venous catheter is in place  with tip in the SVC. The heart is normal in size. The mediastinum is  unremarkable. There has been no significant change in the patients  diffuse bilateral airspace disease. There is no pneumothorax.  There are  no acute osseous abnormalities.       Impression:       No significant change in the patients diffuse bilateral  airspace disease.     Continued followup is recommended.     This report was finalized on 11/9/2019 8:58 AM by Chris Patricio M.D..    XR Chest 1 View [[id number]] Collected:  11/08/19 0936     Updated:  11/08/19 0946    Narrative:       PORTABLE CHEST     INDICATION: Respiratory failure.     FINDINGS: Single view chest, compared with 11/07/2019. Interval  extubation and removal of nasogastric tube. Left internal jugular venous  catheter remains in place. EKG leads overly the chest. Exam is degraded  by patient motion related artifact. Allowing for this, no obvious  pneumothorax. Bilateral infiltrates are similar to previous.       Impression:       Interval extubation but no other definite change in  bilateral infiltrates. Continued follow-up recommended.     This report was finalized on 11/8/2019 9:44 AM by Aleajndro Stewart MD.    US Abdomen Complete [[id number]] Collected:  11/08/19 0837     Updated:  11/08/19 0840    Narrative:       PROCEDURE: US ABDOMEN  COMPLETE-     HISTORY: fever; J96.01-Acute respiratory failure with hypoxia;  J96.02-Acute respiratory failure with hypercapnia; R79.89-Other  specified abnormal findings of blood chemistry; J69.0-Pneumonitis due to  inhalation of food and vomit; T50.904A-Poisoning by unspecified drugs,  medicaments and biological substances, undetermined, initial encounter;  A41.9-Sepsis, unspecified organism     FINDINGS: The gallbladder is empty without stones or sludge.  No  gallbladder wall thickening or pericholecystic fluid.  No biliary ductal  dilatation.  Visualized portions of the hepatic and pancreatic  parenchyma appear unremarkable.  Some portions are mildly obscured by  bowel gas. The right kidney measures 11.0 cm and the left kidney  measures 11.1 cm.  No hydronephrosis, cyst or solid renal mass  identified.  Spleen is not enlarged and is homogeneous.  Aorta is normal  in caliber.  Visualized portion of the inferior vena cava is  unremarkable.  No free fluid.       Impression:       No acute process identified in the abdomen.     This report was finalized on 11/8/2019 8:38 AM by Alejandro Stewart MD.    XR Chest 1 View [[id number]] Collected:  11/07/19 0927     Updated:  11/07/19 0932    Narrative:       PORTABLE CHEST     INDICATION: Respiratory failure. Follow up infiltrates.     FINDINGS: Single frontal portable chest, compared with 11/06/2019. EKG  leads overlie the chest. Support tubes and lines remain in place. No  pneumothorax. Bilateral parenchymal infiltrates are again identified.  These appear similar to subtly improved. No other interval change.       Impression:       Bilateral infiltrates are similar to subtly improved.  Continued follow-up recommended.     This report was finalized on 11/7/2019 9:30 AM by Alejandro Stewart MD.    XR Chest 1 View [[id number]] Collected:  11/06/19 0959     Updated:  11/06/19 1009    Narrative:       PORTABLE CHEST     INDICATION: Respiratory failure.     FINDINGS: Single frontal  portable chest, compared with 11/05/2019. EKG  leads overlie the chest. Endotracheal tube, left internal jugular venous  catheter and nasogastric tube remain in place. No pneumothorax.  Multifocal parenchymal infiltrates in both lungs are again identified.  Allowing for differences in technique and positioning, these are  probably similar to previous. Slight worsening on the left not excluded,  although this is possibly due to differences in positioning.       Impression:       No definite change. Continued follow-up recommended.     This report was finalized on 11/6/2019 10:07 AM by Alejandro Stewart MD.    XR Chest 1 View [[id number]] Collected:  11/05/19 0957     Updated:  11/05/19 1001    Narrative:       PORTABLE CHEST     INDICATION: Respiratory failure.     FINDINGS: Single frontal portable chest, compared with 11/04/2019.  Patient is rotated to the right. EKG leads about the chest. Endotracheal  tube, nasogastric tube and left internal jugular venous catheter remain  in place. No pneumothorax. Prominent multifocal airspace parenchymal  infiltrates appear relatively similar to previous. No other interval  change.       Impression:       Persistence of bilateral infiltrates which may represent  pneumonia. Asymmetric edema or developing ARDS not entirely excluded.  Continued follow-up recommended.     This report was finalized on 11/5/2019 9:59 AM by Alejandro Stewart MD.    XR Chest 1 View [[id number]] Collected:  11/04/19 0853     Updated:  11/04/19 0901    Narrative:       SINGLE VIEW CHEST     INDICATION: Respiratory failure.     FINDINGS: Single frontal view chest, compared with 11/03/2019. EKG leads  overlie the chest. Endotracheal tube terminates above the gilles. A left  internal jugular venous catheter and a nasogastric tube remain in place.  Persistence of bilateral multifocal airspace infiltrates appear similar  to slightly improved. No other significant change.       Impression:       Persistence of bilateral  infiltrates as described. Continued  follow-up recommended.     This report was finalized on 11/4/2019 8:58 AM by Alejandro Stewart MD.    XR Chest 1 View [[id number]] Collected:  11/03/19 1019     Updated:  11/03/19 1053    Narrative:       XR CHEST 1 VW-     HISTORY: Resp Failure.; J96.01-Acute respiratory failure with hypoxia;  J96.02-Acute respiratory failure with hypercapnia; R79.89-Other  specified abnormal findings of blood chemistry; J69.0-Pneumonitis due to  inhalation of food and vomit; T50.904A-Poisoning by unspecified drugs,  medicaments and biological substances, undetermined, initial encounter;  A41.9-Sepsis, unspecified organism     COMPARISON: 2 days prior.     FINDINGS: The heart is normal in size. The mediastinum is unremarkable.  ET tube and NG tube are in stable position as is the left IJ catheter.  Bilateral airspace disease with mild sparing of the apices, left greater  than right, has improved slightly on the right, compared to prior exam.  There is no pneumothorax.  There are no acute osseous abnormalities.  Postsurgical change noted in the cervical spine.       Impression:       1.  Improved airspace disease in the right lung compared to 2 days  prior; stable appearance on the left.  2.  Stable support lines and catheters from prior.     This report was finalized on 11/3/2019 10:51 AM by Aliza Andrea MD.    XR Abdomen KUB [[id number]] Collected:  11/01/19 1239     Updated:  11/01/19 1243    Narrative:       PROCEDURE: XR ABDOMEN KUB-     HISTORY: NG verification so can start tube feedings; J96.01-Acute  respiratory failure with hypoxia; J96.02-Acute respiratory failure with  hypercapnia; R79.89-Other specified abnormal findings of blood  chemistry; J69.0-Pneumonitis due to inhalation of food and vomit;  T50.904A-Poisoning by unspecified drugs, medicaments and biological  substances, undetermined, initial encounter; A41.9-Sepsis, unspecified  or     COMPARISON: None.     FINDINGS: An AP view of the  abdomen and pelvis demonstrates a  nasogastric tube with the tip in the antrum of the stomach. The bowel  gas pattern is unremarkable with no evidence of obstruction.       Impression:       Nasogastric tube with the tip in the antrum of the stomach.     This report was finalized on 11/1/2019 12:41 PM by Chris Patricio M.D..    XR Chest 1 View [[id number]] Collected:  11/01/19 0836     Updated:  11/01/19 0839    Narrative:       PROCEDURE: XR CHEST 1 VW-     HISTORY: Resp Failure.; J96.01-Acute respiratory failure with hypoxia;  J96.02-Acute respiratory failure with hypercapnia; R79.89-Other  specified abnormal findings of blood chemistry; J69.0-Pneumonitis due to  inhalation of food and vomit; T50.904A-Poisoning by unspecified drugs,  medicaments and biological substances, undetermined, initial encounter;  A41.9-Sepsis, unspecified organism     COMPARISON: 10/31/2019.     FINDINGS: The support tubes and lines are appropriately positioned. The  heart is normal in size. The mediastinum is unremarkable. There is  continued worsening of the patient's bilateral airspace disease. No  significant effusions are evident. There is no pneumothorax.  There are  no acute osseous abnormalities.       Impression:       Worsening bilateral airspace disease with the differential  including ARDS and pneumonia.     Continued followup is recommended.     This report was finalized on 11/1/2019 8:37 AM by Chris Patricio M.D..    XR Chest 1 View [[id number]] Collected:  10/31/19 1043     Updated:  10/31/19 1047    Narrative:       PROCEDURE: XR CHEST 1 VW-     HISTORY: Central line placement; J96.01-Acute respiratory failure with  hypoxia; J96.02-Acute respiratory failure with hypercapnia; R79.89-Other  specified abnormal findings of blood chemistry; J69.0-Pneumonitis due to  inhalation of food and vomit; T50.904A-Poisoning by unspecified drugs,  medicaments and biological substances, undetermined, initial  encounter;  A41.9-Sepsis, unspecified organism     COMPARISON: 10/30/2019.     FINDINGS: There has been interval placement of a left internal jugular  central venous catheter with the tip in the SVC. Endotracheal and  nasogastric tubes are well-positioned. The heart is normal in size. The  mediastinum is unremarkable. There has been interval worsening of the  patient's perihilar airspace disease. There is no pneumothorax.  There  are no acute osseous abnormalities.       Impression:       1. Placement of a left internal jugular central venous catheter with no  evidence of pneumothorax.  2. Worsening bilateral airspace disease.     Continued followup is recommended.     This report was finalized on 10/31/2019 10:45 AM by Chris Patricio M.D..    XR Chest 1 View [[id number]] Collected:  10/31/19 0725     Updated:  10/31/19 0728    Narrative:       PROCEDURE: XR CHEST 1 VW-     HISTORY: post intubation     COMPARISON: 07/01/2011.     FINDINGS: Endotracheal and nasogastric tubes are in place and are  well-positioned. The heart is normal in size. The mediastinum is  unremarkable. There is left greater than right perihilar opacities  likely reflecting a pneumonia. No significant effusions are evident.  There is no pneumothorax.  There are no acute osseous abnormalities.       Impression:       1. Support tubes appropriately positioned.  2. Left greater than right perihilar airspace disease consistent with a  pneumonia.     Continued followup is recommended.     This report was finalized on 10/31/2019 7:26 AM by Chris Patricio M.D..    CT Chest Without Contrast [[id number]] Collected:  10/30/19 2208     Updated:  10/30/19 2209    Narrative:       FINAL REPORT    TECHNIQUE:  Routine axial images were obtained from the lung apices to below  the diaphragm without contrast.    CLINICAL HISTORY:  acute resp failure, concern for aspiration/ards    FINDINGS:  There is moderate upper lobe bullous emphysema.  There  is  airspace consolidation in the left greater than right lower  lobes, consistent with pneumonia.  Patchy groundglass airspace  opacities are seen elsewhere, consistent with pneumonia as well.  There is no convincing pleural effusion.  There is no  pneumothorax.      Impression:       Bilateral lung opacities, worrisome for pneumonia    Authenticated by Nasir Bañuelos M.D. on 10/30/2019 10:08:54 PM    CT Head Without Contrast [[id number]] Collected:  10/30/19 2208     Updated:  10/30/19 2209    Narrative:       FINAL REPORT    TECHNIQUE:  Routine axial images through the head were obtained without  contrast.    CLINICAL HISTORY:  suspect anoxic brain injury    FINDINGS:  The ventricles are normal.  There is no mass or other abnormal  hypodensity.  There is no shift of midline structures.  There is  no intracranial hemorrhage.  No acute sinus or osseous  abnormality is seen.      Impression:       Unremarkable.    Authenticated by Nasir Bañuelos M.D. on 10/30/2019 10:08:52 PM        Condition on Discharge:      Stable.    Code status during the hospital stay:    Code Status and Medical Interventions:   Ordered at: 10/30/19 2217     Code Status:    CPR     Medical Interventions (Level of Support Prior to Arrest):    Full     Discharge Disposition:    Home or Self Care    Discharge Medications:       Discharge Medications      New Medications      Instructions Start Date   amLODIPine 10 MG tablet  Commonly known as:  NORVASC   10 mg, Oral, Every 24 Hours Scheduled   Start Date:  11/13/2019     ipratropium-albuterol 0.5-2.5 mg/3 ml nebulizer  Commonly known as:  DUO-NEB   3 mL, Nebulization, Every 6 Hours PRN      metoprolol tartrate 50 MG tablet  Commonly known as:  LOPRESSOR   50 mg, Oral, Every 12 Hours Scheduled      nicotine 21 MG/24HR patch  Commonly known as:  NICODERM CQ   1 patch, Transdermal, Every 24 Hours Scheduled   Start Date:  11/13/2019        Continue These Medications      Instructions Start Date   albuterol  sulfate  (90 Base) MCG/ACT inhaler  Commonly known as:  PROVENTIL HFA;VENTOLIN HFA;PROAIR HFA   2 puffs, Inhalation, Every 4 Hours PRN         Stop These Medications    clindamycin 300 MG capsule  Commonly known as:  CLEOCIN          Discharge Diet:     Diet Instructions     Diet: Cardiac; Thin      Discharge Diet:  Cardiac    Fluid Consistency:  Thin        Activity at Discharge:     Activity Instructions     Activity as Tolerated          Follow-up Appointments:    Follow-up Information     Shivani Canales APRN Follow up in 1 week(s).    Specialty:  Family Medicine  Contact information:  [facility]  [address] [address], [address] [address] [phone]                 Test Results Pending at Discharge:    None.       Prasanna Segal MD  11/12/19  11:33 AM    Time spent: 35 min.    Dictated utilizing Dragon dictation.

## 2019-11-12 NOTE — PROGRESS NOTES
Exercise Oximetry    Patient Name:Eddie Prieto   MRN: [id number]   Date: 11/12/19             ROOM AIR BASELINE   SpO2% 85   Heart Rate 97   Blood Pressure      EXERCISE ON ROOM AIR SpO2% EXERCISE ON O2 @  LPM SpO2%   1 MINUTE  1 MINUTE  2 88   2 MINUTES  2 MINUTES 3 90   3 MINUTES  3 MINUTES 91   4 MINUTES  4 MINUTES 93   5 MINUTES  5 MINUTES 91   6 MINUTES  6 MINUTES 90              Distance Walked   Distance Walked  150   Dyspnea (Linda Scale)   Dyspnea (Linda Scale) 5   Fatigue (Linda Scale)   Fatigue (Linda Scale) 5   SpO2% Post Exercise   SpO2% Post Exercise 90   HR Post Exercise   HR Post Exercise 107   Time to Recovery   Time to Recovery 2 minutes     Comments: pt walked at own slow pace with walker. Pt required  NC @ 2 lpm at rest. NC @ 3 lpm while amb.

## 2019-11-12 NOTE — PROGRESS NOTES
Continued Stay Note  [facility]     Patient Name: Eddie Prieto  MRN: [id number]  Today's Date: 11/12/2019    Admit Date: 10/30/2019    Discharge Plan     Row Name 11/12/19 1212       Plan    Plan Received order from home o2.  Spoke to pt in room and chose from list [facility].  Will being going to his sisters home at [address] [place] [id number] Sister is Saumya Interiano  phone # is [phone].  Called to Liz at [facility] and will take portable o2 tank to room and set up o2 in home.          Discharge Codes    No documentation.       Expected Discharge Date and Time     Expected Discharge Date Expected Discharge Time    Nov 12, 2019             Ingris Garcia RN

## 2019-11-12 NOTE — THERAPY TREATMENT NOTE
Acute Care - Occupational Therapy Treatment Note   [facility]     Patient Name: Eddie Prieto  : 1975  MRN: [id number]  Today's Date: 2019  Onset of Illness/Injury or Date of Surgery: 10/30/19  Date of Referral to OT: 19  Referring Physician: Dr. Tamayo    Admit Date: 10/30/2019       ICD-10-CM ICD-9-CM   1. Acute respiratory failure with hypoxia and hypercapnia (CMS/[facility]) J96.01 518.81    J96.02    2. Elevated procalcitonin R79.89 790.99   3. Aspiration pneumonia, unspecified aspiration pneumonia type, unspecified laterality, unspecified part of lung (CMS/[facility]) J69.0 507.0   4. Polysubstance overdose, undetermined intent, initial encounter T50.904A 977.9     E980.5   5. Sepsis, due to unspecified organism, unspecified whether acute organ dysfunction present (CMS/[facility]) A41.9 038.9     995.91   6. Impaired mobility and ADLs Z74.09 799.89   7. Chronic obstructive pulmonary disease, unspecified COPD type (CMS/[facility]) J44.9 496     Patient Active Problem List   Diagnosis   • Acute respiratory failure with hypoxia and hypercapnia (CMS/[facility])   • Accidental drug overdose   • Toxic encephalopathy   • Sepsis (CMS/[facility])   • Aspiration pneumonia of both lower lobes due to vomit (CMS/[facility])   • Tobacco dependence   • Drug abuse (CMS/[facility])   • Hyperglycemia     Past Medical History:   Diagnosis Date   • Impaired functional mobility, balance, gait, and endurance    • Lung disease      Past Surgical History:   Procedure Laterality Date   • BRONCHOSCOPY N/A 2019    Procedure: BRONCHOSCOPY at bedside with washings;  Surgeon: Bria Tamayo MD;  Location: [facility];  Service: Pulmonary   • LEG SURGERY      broken bone   • NECK SURGERY         Therapy Treatment    Rehabilitation Treatment Summary     Row Name 19 0938             Treatment Time/Intention    Discipline  occupational therapist  -      Document Type  therapy note (daily note)  -      Subjective Information  complains of;pain  -      Mode  of Treatment  occupational therapy  -      Patient/Family Observations  Pt received supine in bed, girlfriend at bedside  -      Care Plan Review  care plan/treatment goals reviewed;patient/other agree to care plan  -      Care Plan Review, Other Participant(s)  significant other  -      Patient Effort  good  -      Existing Precautions/Restrictions  fall;oxygen therapy device and L/min  -      Treatment Considerations/Comments  3L O2 via nc  -      Recorded by [] Liz Roberson 11/12/19 1145      Row Name 11/12/19 0938             Vital Signs    Pre SpO2 (%)  91  -      O2 Delivery Pre Treatment  supplemental O2 3L  -AH      Intra SpO2 (%)  88  -      O2 Delivery Intra Treatment  supplemental O2 3L  -AH      Post SpO2 (%)  89  -      O2 Delivery Post Treatment  supplemental O2 3L  -      Recorded by [] Liz Roberson 11/12/19 1145      Row Name 11/12/19 0938             Bed Mobility Assessment/Treatment    Bed Mobility Assessment/Treatment  supine-sit;sit-supine  -      Supine-Sit [place] (Bed Mobility)  conditional independence  -      Sit-Supine [place] (Bed Mobility)  independent  -      Assistive Device (Bed Mobility)  head of bed elevated  -      Recorded by [] Liz Roberson 11/12/19 1145      Row Name 11/12/19 0938             Functional Mobility    Functional Mobility- Ind. Level  contact guard assist  -      Functional Mobility- Device  rolling walker  -      Functional Mobility-Distance (Feet)  20  -      Functional Mobility- Comment  Pt walked 20' x 2 to bathroom and back  -      Recorded by [] Liz Roberson 11/12/19 1145      Row Name 11/12/19 0938             Transfer Assessment/Treatment    Transfer Assessment/Treatment  sit-stand transfer;stand-sit transfer  -      Recorded by [] Liz Roberson 11/12/19 1145      Row Name 11/12/19 0938             Sit-Stand Transfer    Sit-Stand [place] (Transfers)  contact guard  -      Assistive  Device (Sit-Stand Transfers)  walker, front-wheeled  -      Recorded by [] Liz Roberson 11/12/19 1145      Row Name 11/12/19 0938             Stand-Sit Transfer    Stand-Sit [place] (Transfers)  contact guard  -      Assistive Device (Stand-Sit Transfers)  walker, front-wheeled  -AH      Recorded by [] Liz Roberson 11/12/19 1145      Row Name 11/12/19 0938             ADL Assessment/Intervention    BADL Assessment/Intervention  bathing;lower body dressing  -      Recorded by [] Liz Roberson 11/12/19 1145      Row Name 11/12/19 0938             Bathing Assessment/Intervention    Bathing [place] Level  bathing skills;supervision  -      Bathing Position  unsupported sitting  -      Recorded by [] Liz Roberson 11/12/19 1145      Row Name 11/12/19 0938             Lower Body Dressing Assessment/Training    Lower Body Dressing [place] Level  don;pants/bottoms;supervision  -      Lower Body Dressing Position  unsupported sitting  -      Recorded by [] Liz Roberson 11/12/19 1145      Row Name 11/12/19 0938             BADL Safety/Performance    Impairments, BADL Safety/Performance  endurance/activity tolerance;shortness of breath  -      Recorded by [] Liz Roberson 11/12/19 1145      Row Name 11/12/19 0938             Positioning and Restraints    Pre-Treatment Position  in bed  -      Post Treatment Position  bed  -AH      In Bed  supine;call light within reach;encouraged to call for assist;with family/caregiver  -      Recorded by [] Liz Roberson 11/12/19 1145      Row Name 11/12/19 0938             Pain Scale: Numbers Pre/Post-Treatment    Pain Scale: Numbers, Pretreatment  8/10  -      Pain Scale: Numbers, Post-Treatment  6/10  -      Pain Location  back  -      Pain Intervention(s)  Repositioned;Ambulation/increased activity  -      Recorded by [] Liz Roberson 11/12/19 1145      Row Name                Wound 11/08/19 1200 coccyx Pressure Injury     Wound - Properties Group Date first assessed: 11/08/19 [CM] Time first assessed: 1200 [CM] Location: coccyx [CM] Primary Wound Type: Pressure inj [CM] Stage, Pressure Injury: Stage 1;deep tissue injury [CM] Recorded by:  [CM] Sania Sanchez RN 11/08/19 1724    Row Name                Wound 11/10/19 2200 Right nostril Pressure Injury    Wound - Properties Group Date first assessed: 11/10/19 [CW] Time first assessed: 2200 [CW] Present on Hospital Admission: N [CW] Side: Right [CW] Location: nostril [CW] Primary Wound Type: Pressure inj [CW] Stage, Pressure Injury: medical device related [CW] Recorded by:  [] Kesha Centeno RN 11/11/19 0130    Row Name 11/12/19 0938             Coping    Observed Emotional State  accepting;cooperative  -AH      Verbalized Emotional State  acceptance  -AH      Recorded by [] Liz Roberson 11/12/19 1145      Row Name 11/12/19 0938             Plan of Care Review    Plan of Care Reviewed With  patient;significant other  -AH      Recorded by [] Liz Roberson 11/12/19 1145      Row Name 11/12/19 0938             Outcome Summary/Treatment Plan (OT)    Daily Summary of Progress (OT)  progress toward functional goals as expected  -AH      Recorded by [] Liz Roberson 11/12/19 1145        User Key  (r) = Recorded By, (t) = Taken By, (c) = Cosigned By    Initials Name Effective Dates Discipline    Liz Fernández 03/07/18 -  OT    CW Kesha Centeno, RN 05/01/17 -  Nurse    Sania Pratt RN 10/16/17 -  Nurse        Wound 11/08/19 1200 coccyx Pressure Injury (Active)   Dressing Appearance dry;intact 11/12/2019  8:00 AM   Closure None 11/12/2019  8:00 AM   Base dry;clean;pink 11/12/2019  8:00 AM       Wound 11/10/19 2200 Right nostril Pressure Injury (Active)   Dressing Appearance open to air 11/12/2019  8:00 AM   Closure None 11/12/2019  8:00 AM   Base scab 11/12/2019  8:00 AM       Occupational Therapy Education     Title: PT OT SLP Therapies (In Progress)     Topic:  Occupational Therapy (In Progress)     Point: ADL training (Done)     Description: Instruct learner(s) on proper safety adaptation and remediation techniques during self care or transfers.   Instruct in proper use of assistive devices.    Learning Progress Summary           Patient Acceptance, E,TB, VU by  at 11/12/2019 11:45 AM    Comment:  Pt educated on safety with ADL tasks.    Acceptance, E,TB, VU by SD at 11/11/2019 11:50 AM    Comment:  Benefit of OT; OT POC   Significant Other Acceptance, E,TB, VU by  at 11/12/2019 11:45 AM    Comment:  Pt educated on safety with ADL tasks.                   Point: Precautions (Done)     Description: Instruct learner(s) on prescribed precautions during self-care and functional transfers.    Learning Progress Summary           Patient Acceptance, E,TB, VU by  at 11/12/2019 11:45 AM    Comment:  Pt educated on safety with ADL tasks.   Significant Other Acceptance, E,TB, VU by  at 11/12/2019 11:45 AM    Comment:  Pt educated on safety with ADL tasks.                               User Key     Initials Effective Dates Name Provider Type Discipline     03/07/18 -  Liz Roberson Occupational Therapist OT    SD 03/07/18 -  Fallon Bae OT Occupational Therapist OT                OT Recommendation and Plan  Outcome Summary/Treatment Plan (OT)  Daily Summary of Progress (OT): progress toward functional goals as expected  Daily Summary of Progress (OT): progress toward functional goals as expected  Plan of Care Review  Plan of Care Reviewed With: patient, significant other  Plan of Care Reviewed With: patient, significant other  Outcome Summary: Pt with less soa today.  Able to walk to bathroom with cga using RW, completed bathing tasks with set-up and supervision for safety.  Pt on 3L O2 via nc.  Pt noted with mild soa following bathing task.  Outcome Measures     Row Name 11/12/19 0938 11/11/19 0942 11/11/19 0940       How much help from another person do you  currently need...    Turning from your back to your side while in flat bed without using bedrails?  --  --  3  -RM    Moving from lying on back to sitting on the side of a flat bed without bedrails?  --  --  3  -RM    Moving to and from a bed to a chair (including a wheelchair)?  --  --  3  -RM    Standing up from a chair using your arms (e.g., wheelchair, bedside chair)?  --  --  2  -RM    Climbing 3-5 steps with a railing?  --  --  2  -RM    To walk in hospital room?  --  --  3  -RM    AM-PAC 6 Clicks Score (PT)  --  --  16  -RM       How much help from another is currently needed...    Putting on and taking off regular lower body clothing?  3  -  3  -SD  --    Bathing (including washing, rinsing, and drying)  3  -  3  -SD  --    Toileting (which includes using toilet bed pan or urinal)  4  -  3  -SD  --    Putting on and taking off regular upper body clothing  4  -  3  -SD  --    Taking care of personal grooming (such as brushing teeth)  4  -  3  -SD  --    Eating meals  4  -  4  -SD  --    AM-[facility] 6 Clicks Score (OT)  22  -  19  -SD  --       Functional Assessment    Outcome Measure Options  AM-[facility] 6 Clicks Daily Activity (OT)  -  AM-[facility] 6 Clicks Daily Activity (OT)  -SD  AM-[facility] 6 Clicks Basic Mobility (PT)  -      User Key  (r) = Recorded By, (t) = Taken By, (c) = Cosigned By    Initials Name Provider Type     Liz Roberson Occupational Therapist    Kenji Bautista, PTA Physical Therapy Assistant    Fallon Peoples OT Occupational Therapist           Time Calculation:   Time Calculation- OT     Row Name 11/12/19 1148             Time Calculation- OT    OT Start Time  0938  -      OT Stop Time  1005  -      OT Time Calculation (min)  27 min  -      OT Received On  11/12/19  -      OT Goal Re-Cert Due Date  11/21/19  -         Timed Charges    98765 - OT Therapeutic Activity Minutes  4  -      31986 - OT Self Care/Mgmt Minutes  23  -        User Key  (r) = Recorded By,  (t) = Taken By, (c) = Cosigned By    Initials Name Provider Type    Liz Fernández Occupational Therapist        Therapy Charges for Today     Code Description Service Date Service Provider Modifiers Qty    [id number] HC OT SELF CARE/MGMT/TRAIN EA 15 MIN 11/12/2019 Liz Roberson GO 2               Liz Roberson  11/12/2019

## 2019-11-12 NOTE — DISCHARGE INSTR - LAB
1.  Patient has completed a course of antibiotic therapy in the hospital stay for his pneumonia.  2.  Patient will benefit from outpatient evaluation for COPD.  3.  Patient has been arranged home oxygen at 2 L/min at rest and 3 L/min on exertion.  4.  Repeat chest x-ray in 4 weeks and follow-up with primary care physician.  5.  Patient has been strongly advised against smoking and recreational drug use.  6.  New medications started during this admission for hypertension: Metoprolol and amlodipine.

## 2019-11-12 NOTE — PROGRESS NOTES
"CC: Acute respiratory failure.  Pneumonia    S: Continues to have improvement in his cough and shortness of breath.  Continues to have mild anxiety.  Feels that the cough and shortness of breath have improved over the past 4-5 days.  Continues to complain of some weakness.    ROS: Positive for cough, shortness of breath,  back pain. Denies chest pain, diarrhea or fever.    O: /65 (BP Location: Right arm, Patient Position: Lying)   Pulse 100   Temp 98.4 °F (36.9 °C) (Oral)   Resp 18   Ht 170.2 cm (67.01\")   Wt 61.4 kg (135 lb 6.4 oz)   SpO2 97%   BMI 21.20 kg/m²     Vital signs reviewed.  General/Constitutional: Appears in minimal distress.  Eyes: PERRL.   Neck: Supple without JVD. No obvious masses noted.   Cardiovascular: S1 + S2. Regular.    Lungs/Respiratory: Improved air entry bilaterally with bilateral scattered crackles noted. Respiratory effort was somewhat labored with mild tachypnea noted.  GI/Abdomen: Soft. Bowel sounds positive. No organomegaly noted.  Musculoskeletal/Extremities: Trace edema noted. Gait could not be assessed at this time, as the patient was laying in bed.   Neurologic: Alert and awake.  Was able to sit up to request.  Appears somewhat weak.      Labs: Reviewed.     Results from last 7 days   Lab Units 11/11/19  0457 11/10/19  0444 11/09/19  0406   SODIUM mmol/L 142 146* 144   POTASSIUM mmol/L 3.5 3.0* 3.1*   CHLORIDE mmol/L 101 105 103   CO2 mmol/L 30.2* 29.4* 28.4   BUN mg/dL 25* 25* 23*   CREATININE mg/dL 0.53* 0.53* 0.57*   CALCIUM mg/dL 9.0 8.8 8.7   GLUCOSE mg/dL 110* 136* 135*             Results from last 7 days   Lab Units 11/11/19  0457 11/10/19  0444 11/09/19  0406 11/08/19  0420 11/07/19  0404   WBC 10*3/mm3 16.95* 16.88* 22.17* 21.65* 10.33   NEUTROPHIL % %  --   --  79.2* 84.5* 69.7   HEMOGLOBIN g/dL 11.1* 10.8* 10.6* 10.2* 8.3*   HEMATOCRIT % 36.2* 35.7* 34.2* 32.3* 27.4*   PLATELETS 10*3/mm3 603* 589* 546* 452* 240             Lab Results   Component Value " Date    PROCALCITO 0.39 (H) 11/11/2019    PROCALCITO 1.16 (H) 11/08/2019    PROCALCITO 2.72 (H) 11/05/2019       Lab Results   Component Value Date    PROBNP 385.2 10/30/2019     Micro: As of November 12, 2019   Lab Results   Component Value Date    RESPCX Moderate growth (3+) Citrobacter koseri (A) 11/06/2019    RESPCX Light growth (2+) Normal Respiratory Dunia 11/06/2019     Lab Results   Component Value Date    BLOODCX No growth at 5 days 11/07/2019    BLOODCX No growth at 5 days 11/07/2019       Lab Results   Component Value Date    MRSACX  10/31/2019     No Methicillin Resistant Staphylococcus aureus isolated          CXRay: Latest imaging study was reviewed personally.   Imaging Results (Last 24 Hours)     ** No results found for the last 24 hours. **        Assessment & Recommendations/Plan:   1.  Acute Respiratory Failure  Status post bronchoscopy.  Extubated on 11/7/2019.  We will asked the nursing staff to decrease FiO2 as much as tolerated.    2.  Left sided ?aspiration? Pneumonia.   Procalcitonin level decreasing.    Bronchial washing showing Citrobacter.  Respiratory cultures in the past revealed as Acinetobacter and Citrobacter.  Finished appropriate antibiotics.    3.  Drug overdose.  Toxicology screen was positive for multiple agents.  Continue morphine as needed.    4.  Anemia.  Was likely dilutional.   Improved with diuresis.    5.  Oral candidiasis   Treated with nystatin swish and swallow.    6.  Hypertension  Under better control.    7.  Deconditioning  We will recommend therapy with physical therapy and occupational therapy.    We have reviewed patient's current orders and changes, if any, have been suggested to primary care team. Plan was also discussed with nursing staff, as necessary.     This document was electronically signed by Bria Tamayo MD on 11/12/19 at 8:46 AM      Dictated utilizing Dragon dictation.

## 2019-11-12 NOTE — PLAN OF CARE
Problem: Patient Care Overview  Goal: Plan of Care Review  Outcome: Ongoing (interventions implemented as appropriate)   11/12/19 0818   Plan of Care Review   Progress improving   Coping/Psychosocial   Plan of Care Reviewed With patient;spouse   OTHER   Outcome Summary (wounds improving per pictures)       Problem: Skin Injury Risk (Adult)  Goal: Skin Health and Integrity  Outcome: Ongoing (interventions implemented as appropriate)   11/12/19 3353   Skin Injury Risk (Adult)   Skin Health and Integrity making progress toward outcome       Problem: Wound (Includes Pressure Injury) (Adult)  Goal: Signs and Symptoms of Listed Potential Problems Will be Absent, Minimized or Managed (Wound)  Outcome: Ongoing (interventions implemented as appropriate)   11/12/19 1221   Goal/Outcome Evaluation   Problems Assessed (Wound) all   Problems Present (Wound) infection

## 2019-11-12 NOTE — PLAN OF CARE
Problem: Pneumonia (Adult)  Goal: Signs and Symptoms of Listed Potential Problems Will be Absent, Minimized or Managed (Pneumonia)   11/11/19 2217   Goal/Outcome Evaluation   Problems Assessed (Pneumonia) infection progression;respiratory compromise   Problems Present (Pneumonia) infection progression;respiratory compromise       Problem: Fall Risk (Adult)  Goal: Identify Related Risk Factors and Signs and Symptoms   11/11/19 2217   Fall Risk (Adult)   Related Risk Factors (Fall Risk) culprit medication(s);depression/anxiety;fatigue/slow reaction;gait/mobility problems;history of falls;environment unfamiliar   Signs and Symptoms (Fall Risk) presence of risk factors

## 2019-11-12 NOTE — NURSING NOTE
pt stated unsure of causes of healing wounds all over body but could be due to injections of ice; wife showed picture of oozing wounds on arm and stated had all over when came in but much better; pt stated had previous blisters and oozing wounds but are better now; wife stated picks at wounds all the time; discussed importance of not injecting drugs and the seriousness of skin/wound infections; multiple scars, scabs and partial thickness wounds noted on body; areas on linda buttocks/coccyx continue to improve per previous picture but still have thin linear line of blisters and right nare scab noted, unable to determine cause of wounds but concerning due to history and linear like blisters; encouraged not to pick at any wounds due to infection; emotional support given

## 2019-11-12 NOTE — PLAN OF CARE
Problem: Patient Care Overview  Goal: Plan of Care Review  Outcome: Ongoing (interventions implemented as appropriate)   11/12/19 1145   Plan of Care Review   Progress improving   Coping/Psychosocial   Plan of Care Reviewed With patient;significant other   OTHER   Outcome Summary Pt with less soa today. Able to walk to bathroom with cga using RW, completed bathing tasks and LB dressing with set-up and supervision for safety. Pt on 3L O2 via nc. Pt noted with mild soa following bathing task. O2 sats at 88% after ADL

## 2019-11-13 ENCOUNTER — READMISSION MANAGEMENT (OUTPATIENT)
Dept: CALL CENTER | Facility: HOSPITAL | Age: 44
End: 2019-11-13

## 2019-11-13 NOTE — OUTREACH NOTE
Prep Survey      Responses   Facility patient discharged from?  [facility]   Is patient eligible?  No   What are the reasons patient is not eligible?  Other [Acute drug overdose with opiates and methamphetamine, present on admission.]   Discharge diagnosis  PNA/COPD   Does the patient have one of the following disease processes/diagnoses(primary or secondary)?  COPD/Pneumonia   Is there a DME ordered?  Yes   What DME was ordered?  O2   Prep survey completed?  Yes          Lori Garcia RN

## 2019-11-13 NOTE — PROGRESS NOTES
Case Management Discharge Note         Destination      No service has been selected for the patient.      Durable Medical Equipment - Selection Complete      Service Provider Request Status Selected Services Address Phone Number Fax Number    [facility] Selected Durable Medical Equipment [address] [address] [phone]      Dialysis/Infusion      No service has been selected for the patient.      Home Medical Care      No service has been selected for the patient.      Therapy      No service has been selected for the patient.      Community Resources      No service has been selected for the patient.        Transportation Services  Private: Car    Final Discharge Disposition Code: 01 - home or self-care

## 2019-11-16 ENCOUNTER — APPOINTMENT (OUTPATIENT)
Dept: CT IMAGING | Facility: HOSPITAL | Age: 44
End: 2019-11-16

## 2019-11-16 ENCOUNTER — HOSPITAL ENCOUNTER (EMERGENCY)
Facility: HOSPITAL | Age: 44
Discharge: LEFT BEFORE DISCHARGE | End: 2019-11-16
Attending: STUDENT IN AN ORGANIZED HEALTH CARE EDUCATION/TRAINING PROGRAM | Admitting: EMERGENCY MEDICINE

## 2019-11-16 ENCOUNTER — APPOINTMENT (OUTPATIENT)
Dept: GENERAL RADIOLOGY | Facility: HOSPITAL | Age: 44
End: 2019-11-16

## 2019-11-16 ENCOUNTER — HOSPITAL ENCOUNTER (EMERGENCY)
Facility: HOSPITAL | Age: 44
Discharge: HOME | End: 2019-11-16
Attending: STUDENT IN AN ORGANIZED HEALTH CARE EDUCATION/TRAINING PROGRAM | Admitting: STUDENT IN AN ORGANIZED HEALTH CARE EDUCATION/TRAINING PROGRAM

## 2019-11-16 VITALS
TEMPERATURE: 98.5 F | HEIGHT: 66 IN | SYSTOLIC BLOOD PRESSURE: 130 MMHG | WEIGHT: 137 LBS | HEART RATE: 103 BPM | OXYGEN SATURATION: 94 % | RESPIRATION RATE: 20 BRPM | DIASTOLIC BLOOD PRESSURE: 79 MMHG | BODY MASS INDEX: 22.02 KG/M2

## 2019-11-16 VITALS
DIASTOLIC BLOOD PRESSURE: 82 MMHG | RESPIRATION RATE: 17 BRPM | TEMPERATURE: 97.3 F | BODY MASS INDEX: 22.02 KG/M2 | OXYGEN SATURATION: 93 % | SYSTOLIC BLOOD PRESSURE: 122 MMHG | HEIGHT: 66 IN | WEIGHT: 137 LBS | HEART RATE: 126 BPM

## 2019-11-16 DIAGNOSIS — R06.02 SHORTNESS OF BREATH: Primary | ICD-10-CM

## 2019-11-16 DIAGNOSIS — T40.1X1A HEROIN OVERDOSE, ACCIDENTAL OR UNINTENTIONAL, INITIAL ENCOUNTER (HCC): Primary | ICD-10-CM

## 2019-11-16 LAB
ALBUMIN SERPL-MCNC: 3.1 G/DL (ref 3.5–5.2)
ALBUMIN/GLOB SERPL: 0.7 G/DL
ALP SERPL-CCNC: 150 U/L (ref 39–117)
ALT SERPL W P-5'-P-CCNC: 50 U/L (ref 1–41)
ANION GAP SERPL CALCULATED.3IONS-SCNC: 8.2 MMOL/L (ref 5–15)
AST SERPL-CCNC: 31 U/L (ref 1–40)
BASOPHILS # BLD AUTO: 0.17 10*3/MM3 (ref 0–0.2)
BASOPHILS NFR BLD AUTO: 1 % (ref 0–1.5)
BILIRUB SERPL-MCNC: 0.2 MG/DL (ref 0.2–1.2)
BUN BLD-MCNC: 15 MG/DL (ref 6–20)
BUN/CREAT SERPL: 23.8 (ref 7–25)
CALCIUM SPEC-SCNC: 8.8 MG/DL (ref 8.6–10.5)
CHLORIDE SERPL-SCNC: 97 MMOL/L (ref 98–107)
CO2 SERPL-SCNC: 29.8 MMOL/L (ref 22–29)
CREAT BLD-MCNC: 0.63 MG/DL (ref 0.76–1.27)
DEPRECATED RDW RBC AUTO: 43.2 FL (ref 37–54)
EOSINOPHIL # BLD AUTO: 0.61 10*3/MM3 (ref 0–0.4)
EOSINOPHIL NFR BLD AUTO: 3.7 % (ref 0.3–6.2)
ERYTHROCYTE [DISTWIDTH] IN BLOOD BY AUTOMATED COUNT: 14.7 % (ref 12.3–15.4)
GFR SERPL CREATININE-BSD FRML MDRD: 138 ML/MIN/1.73
GLOBULIN UR ELPH-MCNC: 4.7 GM/DL
GLUCOSE BLD-MCNC: 112 MG/DL (ref 65–99)
HCT VFR BLD AUTO: 32.6 % (ref 37.5–51)
HGB BLD-MCNC: 10 G/DL (ref 13–17.7)
IMM GRANULOCYTES # BLD AUTO: 0.13 10*3/MM3 (ref 0–0.05)
IMM GRANULOCYTES NFR BLD AUTO: 0.8 % (ref 0–0.5)
LYMPHOCYTES # BLD AUTO: 4.98 10*3/MM3 (ref 0.7–3.1)
LYMPHOCYTES NFR BLD AUTO: 30.2 % (ref 19.6–45.3)
MCH RBC QN AUTO: 24.8 PG (ref 26.6–33)
MCHC RBC AUTO-ENTMCNC: 30.7 G/DL (ref 31.5–35.7)
MCV RBC AUTO: 80.9 FL (ref 79–97)
MONOCYTES # BLD AUTO: 1.18 10*3/MM3 (ref 0.1–0.9)
MONOCYTES NFR BLD AUTO: 7.2 % (ref 5–12)
NEUTROPHILS # BLD AUTO: 9.41 10*3/MM3 (ref 1.7–7)
NEUTROPHILS NFR BLD AUTO: 57.1 % (ref 42.7–76)
NRBC BLD AUTO-RTO: 0 /100 WBC (ref 0–0.2)
NT-PROBNP SERPL-MCNC: 58.5 PG/ML (ref 5–450)
PLATELET # BLD AUTO: 574 10*3/MM3 (ref 140–450)
PMV BLD AUTO: 8.8 FL (ref 6–12)
POTASSIUM BLD-SCNC: 4 MMOL/L (ref 3.5–5.2)
PROT SERPL-MCNC: 7.8 G/DL (ref 6–8.5)
RBC # BLD AUTO: 4.03 10*6/MM3 (ref 4.14–5.8)
SODIUM BLD-SCNC: 135 MMOL/L (ref 136–145)
TROPONIN T SERPL-MCNC: <0.01 NG/ML (ref 0–0.03)
WBC NRBC COR # BLD: 16.48 10*3/MM3 (ref 3.4–10.8)

## 2019-11-16 PROCEDURE — 94640 AIRWAY INHALATION TREATMENT: CPT

## 2019-11-16 PROCEDURE — 99283 EMERGENCY DEPT VISIT LOW MDM: CPT

## 2019-11-16 PROCEDURE — 71046 X-RAY EXAM CHEST 2 VIEWS: CPT

## 2019-11-16 PROCEDURE — 25010000002 METHYLPREDNISOLONE PER 125 MG: Performed by: PHYSICIAN ASSISTANT

## 2019-11-16 PROCEDURE — 96374 THER/PROPH/DIAG INJ IV PUSH: CPT

## 2019-11-16 PROCEDURE — 85025 COMPLETE CBC W/AUTO DIFF WBC: CPT | Performed by: PHYSICIAN ASSISTANT

## 2019-11-16 PROCEDURE — 83880 ASSAY OF NATRIURETIC PEPTIDE: CPT | Performed by: PHYSICIAN ASSISTANT

## 2019-11-16 PROCEDURE — 84484 ASSAY OF TROPONIN QUANT: CPT | Performed by: PHYSICIAN ASSISTANT

## 2019-11-16 PROCEDURE — 80053 COMPREHEN METABOLIC PANEL: CPT | Performed by: PHYSICIAN ASSISTANT

## 2019-11-16 PROCEDURE — 93005 ELECTROCARDIOGRAM TRACING: CPT | Performed by: PHYSICIAN ASSISTANT

## 2019-11-16 PROCEDURE — 94799 UNLISTED PULMONARY SVC/PX: CPT

## 2019-11-16 RX ORDER — METHYLPREDNISOLONE SODIUM SUCCINATE 125 MG/2ML
125 INJECTION, POWDER, LYOPHILIZED, FOR SOLUTION INTRAMUSCULAR; INTRAVENOUS ONCE
Status: COMPLETED | OUTPATIENT
Start: 2019-11-16 | End: 2019-11-16

## 2019-11-16 RX ORDER — IPRATROPIUM BROMIDE AND ALBUTEROL SULFATE 2.5; .5 MG/3ML; MG/3ML
3 SOLUTION RESPIRATORY (INHALATION) ONCE
Status: COMPLETED | OUTPATIENT
Start: 2019-11-16 | End: 2019-11-16

## 2019-11-16 RX ADMIN — IPRATROPIUM BROMIDE AND ALBUTEROL SULFATE 3 ML: .5; 3 SOLUTION RESPIRATORY (INHALATION) at 18:56

## 2019-11-16 RX ADMIN — METHYLPREDNISOLONE SODIUM SUCCINATE 125 MG: 125 INJECTION, POWDER, FOR SOLUTION INTRAMUSCULAR; INTRAVENOUS at 18:53

## 2019-11-16 NOTE — ED PROVIDER NOTES
Subjective   44-year-old male that presents via police after being treated with Narcan with 2 doses for opiate overdose.  Patient is awake alert and oriented at this time.  Patient reports heroin use.  Reports he was just discharged from here 2 days ago after being treated for pneumonia.  States he is still currently taking antibiotics.  Does not wish to answer other questions at this time.            Review of Systems   Unable to perform ROS: Other (Lack of patient cooperation)       Past Medical History:   Diagnosis Date   • Impaired functional mobility, balance, gait, and endurance    • Lung disease        Allergies   Allergen Reactions   • Ultram [Tramadol Hcl] Hives       Past Surgical History:   Procedure Laterality Date   • BRONCHOSCOPY N/A 11/6/2019    Procedure: BRONCHOSCOPY at bedside with washings;  Surgeon: Bria Tamayo MD;  Location: [facility];  Service: Pulmonary   • LEG SURGERY      broken bone   • NECK SURGERY         History reviewed. No pertinent family history.    Social History     Socioeconomic History   • Marital status: Single     Spouse name: Not on file   • Number of children: Not on file   • Years of education: Not on file   • Highest education level: Not on file   Tobacco Use   • Smoking status: Current Every Day Smoker     Packs/day: 0.50     Types: Cigarettes   • Smokeless tobacco: Current User   Substance and Sexual Activity   • Alcohol use: No   • Drug use: Yes     Comment: heroin   • Sexual activity: Defer           Objective   Physical Exam   Nursing note and vitals reviewed.    GEN: No acute distress  Head: Normocephalic, atraumatic  Eyes: Pupils equal round reactive to light  ENT: Posterior pharynx normal in appearance, oral mucosa is moist  Chest: Nontender to palpation  Cardiovascular: Regular tachycardia in the 120s  Lungs: Clear to auscultation bilaterally  Abdomen: Soft, nontender, nondistended, no peritoneal signs  Extremities: No edema, normal appearance  Neuro: GCS  15  Psych: Somewhat agitated  Procedures           ED Course                  MDM  Number of Diagnoses or Management Options  Heroin overdose, accidental or unintentional, initial encounter (CMS/[facility]):   Diagnosis management comments: Patient arrived in the emergency department awake alert and oriented after treatment in the field with Narcan.  Did advise the patient on the dangers of opiate abuse.  Patient was treated with Narcan 2 hours ago.  prison protocol currently requires all patients Narcan in the field to be evaluated in the emergency department.  Patient should be safe and is outside any real window of danger if he does not use further narcotics.       Amount and/or Complexity of Data Reviewed  Decide to obtain previous medical records or to obtain history from someone other than the patient: yes  Obtain history from someone other than the patient: yes        Final diagnoses:   Heroin overdose, accidental or unintentional, initial encounter (CMS/[facility])              Shashi Moreau MD  11/16/19 [id number]

## 2019-11-16 NOTE — ED PROVIDER NOTES
Subjective   This patient was treated in this emergency department on 10/30/2019 for heroin overdose.  He was intubated and admitted and spent 13 days in the hospital and was just discharged on 11/12/2019.  He was treated for pneumonia and acute hypoxic respiratory failure.  He was discharged on 2 antihypertensives as well as a new diagnosis of oxygen requirement using 2 L nasal cannula at rest and 3 L nasal cannula with exertion.  Today he snorted heroin this afternoon and was arrested and brought in by police.  He was medically cleared as he had no complaints at that time after Narcan resolved his overdose and he is been in nursing home this afternoon however he comes back to the ER today for the third time since 10/30/2019 and is complaining of shortness of breath that occurred after he was cleared here.  He denies any chest pain but states he feels tightness in his chest.  He has had no fever.  He states he had a cough but is not been productive.  No hemoptysis.            Review of Systems   Constitutional: Negative.  Negative for fever.   HENT: Negative.    Eyes: Negative.    Respiratory: Positive for cough and shortness of breath.    Cardiovascular: Negative.  Negative for chest pain and leg swelling.   Gastrointestinal: Negative.    Genitourinary: Negative.    Musculoskeletal: Negative.    Skin: Negative.    Allergic/Immunologic: Negative.    Neurological: Negative.    Psychiatric/Behavioral: Negative.    All other systems reviewed and are negative.      Past Medical History:   Diagnosis Date   • Impaired functional mobility, balance, gait, and endurance    • Lung disease        Allergies   Allergen Reactions   • Ultram [Tramadol Hcl] Hives       Past Surgical History:   Procedure Laterality Date   • BRONCHOSCOPY N/A 11/6/2019    Procedure: BRONCHOSCOPY at bedside with washings;  Surgeon: Bria Tamayo MD;  Location: [facility];  Service: Pulmonary   • LEG SURGERY      broken bone   • NECK SURGERY          History reviewed. No pertinent family history.    Social History     Socioeconomic History   • Marital status: Single     Spouse name: Not on file   • Number of children: Not on file   • Years of education: Not on file   • Highest education level: Not on file   Tobacco Use   • Smoking status: Current Every Day Smoker     Packs/day: 0.50     Types: Cigarettes   • Smokeless tobacco: Current User   Substance and Sexual Activity   • Alcohol use: No   • Drug use: Yes     Comment: heroin   • Sexual activity: Defer           Objective   Physical Exam   Constitutional: He appears well-developed and well-nourished.   HENT:   Head: Normocephalic and atraumatic.   Cardiovascular: Normal rate and regular rhythm.   Pulmonary/Chest: Effort normal. He has decreased breath sounds. He has wheezes.   Abdominal: Soft.   Musculoskeletal:        Right lower leg: Normal. He exhibits no tenderness and no edema.        Left lower leg: Normal. He exhibits no tenderness and no edema.   Neurological: He is alert.   Skin: Skin is warm and dry. Capillary refill takes less than 2 seconds.   Psychiatric: He has a normal mood and affect. His behavior is normal.   Nursing note and vitals reviewed.      Procedures           ED Course      EKG interpreted by me.  Sinus tachycardia at a rate of 103 with a sinus arrhythmia this is a abnormal EKG.    I was alerted by nursing staff that the patient was not under arrest and not in please custody, ED tech called dispatch to confirm this.  He signed out before I was able to go back into the room to speak with him AGAINST MEDICAL ADVICE.  Last I saw him he was stable but normal vital signs oxygenating well on his 2 L oxygen nasal cannula.  Chest x-ray improved from one done on 11/11/2019.  White count elevated but unchanged from 11/11/2019.            MDM    Final diagnoses:   Shortness of breath              Dinesh Conrad PA-C  11/16/19 2023       Dinesh Conrad PA-C  11/16/19  [id number]

## 2019-11-17 NOTE — ED NOTES
Dispatch contacted to confirm patient was not in police custody. Dispatch stated patient was not in police custody at this time.      Mariel Leary  11/16/19 2007

## 2021-09-18 PROCEDURE — 99282 EMERGENCY DEPT VISIT SF MDM: CPT

## 2021-09-19 ENCOUNTER — APPOINTMENT (OUTPATIENT)
Dept: GENERAL RADIOLOGY | Facility: HOSPITAL | Age: 46
End: 2021-09-19

## 2021-09-19 ENCOUNTER — HOSPITAL ENCOUNTER (EMERGENCY)
Facility: HOSPITAL | Age: 46
Discharge: HOME | End: 2021-09-19
Attending: EMERGENCY MEDICINE | Admitting: EMERGENCY MEDICINE

## 2021-09-19 VITALS
HEIGHT: 67 IN | WEIGHT: 178.4 LBS | SYSTOLIC BLOOD PRESSURE: 123 MMHG | DIASTOLIC BLOOD PRESSURE: 68 MMHG | HEART RATE: 87 BPM | BODY MASS INDEX: 28 KG/M2 | TEMPERATURE: 98.3 F | RESPIRATION RATE: 17 BRPM | OXYGEN SATURATION: 96 %

## 2021-09-19 DIAGNOSIS — J44.1 COPD EXACERBATION (HCC): Primary | ICD-10-CM

## 2021-09-19 PROCEDURE — 71045 X-RAY EXAM CHEST 1 VIEW: CPT

## 2021-09-19 PROCEDURE — 93005 ELECTROCARDIOGRAM TRACING: CPT | Performed by: EMERGENCY MEDICINE

## 2021-09-19 RX ORDER — METHYLPREDNISOLONE SODIUM SUCCINATE 125 MG/2ML
125 INJECTION, POWDER, LYOPHILIZED, FOR SOLUTION INTRAMUSCULAR; INTRAVENOUS ONCE
Status: DISCONTINUED | OUTPATIENT
Start: 2021-09-19 | End: 2021-09-19 | Stop reason: HOSPADM

## 2021-09-19 RX ORDER — DOXYCYCLINE 100 MG/1
100 CAPSULE ORAL 2 TIMES DAILY
Qty: 20 CAPSULE | Refills: 0 | Status: SHIPPED | OUTPATIENT
Start: 2021-09-19

## 2021-09-19 RX ORDER — PREDNISONE 20 MG/1
20 TABLET ORAL 2 TIMES DAILY
Qty: 10 TABLET | Refills: 0 | Status: SHIPPED | OUTPATIENT
Start: 2021-09-19

## 2021-09-19 RX ORDER — ALBUTEROL SULFATE 90 UG/1
2 AEROSOL, METERED RESPIRATORY (INHALATION) EVERY 4 HOURS PRN
Qty: 18 G | Refills: 0 | Status: SHIPPED | OUTPATIENT
Start: 2021-09-19

## 2021-09-19 NOTE — ED NOTES
"Attempted to give meds to pt.,pt refused injection at this time. Pt. Drowsy and difficult to talk to. Asked pt. Why he was here and he responeded \"I don't know.\" Dr. Yañez informed.      Scarlett Moyer, TREMAYNE  09/19/21 0219       Scarlett Moyer, RN  09/19/21 0221    "
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
## 2021-09-19 NOTE — ED PROVIDER NOTES
TRIAGE CHIEF COMPLAINT:     Nursing and triage notes reviewed    Chief Complaint   Patient presents with   • Shortness of Breath      HPI: Eddie Prieto is a 46 y.o. male who presents to the emergency department complaining of shortness of breath and cough.  Patient states symptoms have been ongoing for the past few days.  Patient has a history of COPD and but states he believes he is having a flareup.  He states that he has not had any fevers or chills.  He denies any chest pain.  He states he has a nonproductive cough and wheezing.  Has tried his inhaler at home but it has not helped.    REVIEW OF SYSTEMS: All other systems reviewed and are negative     PAST MEDICAL HISTORY:   Past Medical History:   Diagnosis Date   • Impaired functional mobility, balance, gait, and endurance    • Lung disease         FAMILY HISTORY:   History reviewed. No pertinent family history.     SOCIAL HISTORY:   Social History     Socioeconomic History   • Marital status: Single     Spouse name: Not on file   • Number of children: Not on file   • Years of education: Not on file   • Highest education level: Not on file   Tobacco Use   • Smoking status: Current Every Day Smoker     Packs/day: 0.50     Types: Cigarettes   • Smokeless tobacco: Current User   Substance and Sexual Activity   • Alcohol use: No   • Drug use: Yes     Types: Methamphetamines     Comment: heroin   • Sexual activity: Defer        SURGICAL HISTORY:   Past Surgical History:   Procedure Laterality Date   • BRONCHOSCOPY N/A 11/6/2019    Procedure: BRONCHOSCOPY at bedside with washings;  Surgeon: Bria Tamayo MD;  Location: [facility];  Service: Pulmonary   • LEG SURGERY      broken bone   • NECK SURGERY          CURRENT MEDICATIONS:      Medication List      ASK your doctor about these medications    albuterol sulfate  (90 Base) MCG/ACT inhaler  Commonly known as: PROVENTIL HFA;VENTOLIN HFA;PROAIR HFA     amLODIPine 10 MG tablet  Commonly known as:  NORVASC  Take 1 tablet by mouth Daily.     metoprolol tartrate 50 MG tablet  Commonly known as: LOPRESSOR  Take 1 tablet by mouth Every 12 (Twelve) Hours.             ALLERGIES: Ultram [tramadol hcl]     PHYSICAL EXAM:   VITAL SIGNS:   Vitals:    09/18/21 2355   BP: 135/87   Pulse: 102   Resp: 18   Temp: 98.3 °F (36.8 °C)   SpO2: 97%      CONSTITUTIONAL: Awake, oriented, appears non-toxic   HENT: Atraumatic, normocephalic, oral mucosa pink and moist, airway patent. Nares patent without drainage. External ears normal.   EYES: Conjunctiva clear  NECK: Trachea midlinee   CARDIOVASCULAR: Normal heart rate, Normal rhythm, No murmurs, rubs, gallops   PULMONARY/CHEST: Frequent coughing noted.  Scattered wheezes in all lung fields.  No increased work of breathing.  ABDOMINAL: Non-distended, soft, non-tender - no rebound or guarding.  NEUROLOGIC: Non-focal, moving all four extremities, no gross sensory or motor deficits.   EXTREMITIES: No clubbing, cyanosis, or edema   SKIN: Warm, Dry, No erythema, No rash     ED COURSE / MEDICAL DECISION MAKING:   Eddie Prieto is a 46 y.o. male who presents to the emergency department for evaluation of cough and wheezing.  Patient nondistressed on arrival in the emergency department.  Vital signs are stable on arrival.  Exam reveals frequent coughing as well as scattered wheezes in all lung fields.  Exam otherwise unremarkable.    Chest x-ray per my interpretation reveals possible lower lobe infiltrates versus atelectasis.    Patient refused a nasal swab or other testing at this time aside from an EKG which was obtained and interpreted by me.  It reveals sinus rhythm with a rate of 97 bpm.  There is low QRS voltage in chest leads.  This is an atypical appearing EKG.    We will treat patient for COPD exacerbation.    DECISION TO DISCHARGE/ADMIT: see ED care timeline     FINAL IMPRESSION:   1 --COPD exacerbation  2 --   3 --     Electronically signed by: Gwen Yañez MD, 9/19/2021  00:51 Gwen Jacome MD  09/19/21 0258

## 2022-06-12 ENCOUNTER — HOSPITAL ENCOUNTER (EMERGENCY)
Facility: HOSPITAL | Age: 47
Discharge: HOME | End: 2022-06-12
Attending: EMERGENCY MEDICINE | Admitting: EMERGENCY MEDICINE

## 2022-06-12 VITALS
RESPIRATION RATE: 14 BRPM | BODY MASS INDEX: 25.76 KG/M2 | OXYGEN SATURATION: 98 % | HEIGHT: 68 IN | HEART RATE: 94 BPM | WEIGHT: 170 LBS

## 2022-06-12 DIAGNOSIS — T40.601A OPIATE OVERDOSE, ACCIDENTAL OR UNINTENTIONAL, INITIAL ENCOUNTER: Primary | ICD-10-CM

## 2022-06-12 PROCEDURE — 96376 TX/PRO/DX INJ SAME DRUG ADON: CPT

## 2022-06-12 PROCEDURE — 99283 EMERGENCY DEPT VISIT LOW MDM: CPT

## 2022-06-12 PROCEDURE — 96374 THER/PROPH/DIAG INJ IV PUSH: CPT

## 2022-06-12 RX ORDER — NALOXONE HYDROCHLORIDE 1 MG/ML
INJECTION INTRAMUSCULAR; INTRAVENOUS; SUBCUTANEOUS
Status: COMPLETED
Start: 2022-06-12 | End: 2022-06-12

## 2022-06-12 RX ORDER — NALOXONE HYDROCHLORIDE 1 MG/ML
2 INJECTION INTRAMUSCULAR; INTRAVENOUS; SUBCUTANEOUS ONCE
Status: COMPLETED | OUTPATIENT
Start: 2022-06-12 | End: 2022-06-12

## 2022-06-12 RX ADMIN — NALOXONE HYDROCHLORIDE 2 MG: 1 INJECTION INTRAMUSCULAR; INTRAVENOUS; SUBCUTANEOUS at 11:55

## 2022-06-12 RX ADMIN — NALOXONE HYDROCHLORIDE 2 MG: 1 INJECTION PARENTERAL at 11:55

## 2022-06-12 RX ADMIN — NALOXONE HYDROCHLORIDE 2 MG: 1 INJECTION PARENTERAL at 11:50

## 2022-06-12 NOTE — ED PROVIDER NOTES
Subjective   46-year-old male presents to the ED via a friend for suspected drug overdose.  Patient was brought to the ambulance bay unresponsive.  Family states that he may have done some drugs they are unsure.  Patient was pulled out of the car and placed on a stretcher.  The patient was apneic, cyanotic, diaphoretic and unresponsive.  Further history review of systems limited secondary to patient's clinical status.          Review of Systems   Unable to perform ROS: Mental status change       Past Medical History:   Diagnosis Date   • Impaired functional mobility, balance, gait, and endurance    • Lung disease        Allergies   Allergen Reactions   • Ultram [Tramadol Hcl] Hives       Past Surgical History:   Procedure Laterality Date   • BRONCHOSCOPY N/A 11/6/2019    Procedure: BRONCHOSCOPY at bedside with washings;  Surgeon: Bria Tamayo MD;  Location: [facility];  Service: Pulmonary   • LEG SURGERY      broken bone   • NECK SURGERY         History reviewed. No pertinent family history.    Social History     Socioeconomic History   • Marital status: Single   Tobacco Use   • Smoking status: Current Every Day Smoker     Packs/day: 0.50     Types: Cigarettes   • Smokeless tobacco: Current User   Substance and Sexual Activity   • Alcohol use: No   • Drug use: Yes     Types: Methamphetamines     Comment: heroin   • Sexual activity: Defer           Objective   Physical Exam  Vitals and nursing note reviewed.   Constitutional:       General: He is not in acute distress.     Appearance: He is well-developed. He is ill-appearing, toxic-appearing and diaphoretic.      Comments: Unresponsive, cyanotic, diaphoretic,   HENT:      Head: Normocephalic and atraumatic.   Eyes:      Conjunctiva/sclera: Conjunctivae normal.      Comments: Pinpoint pupils bilaterally   Cardiovascular:      Rate and Rhythm: Regular rhythm. Tachycardia present.      Pulses: Normal pulses.   Pulmonary:      Effort: No respiratory distress.       Comments: Coarse breath sounds bilaterally with bag-valve-mask ventilation, apnea  Abdominal:      General: There is no distension.      Palpations: Abdomen is soft.      Tenderness: There is no abdominal tenderness.   Musculoskeletal:         General: No deformity.   Neurological:      Comments: Unresponsive.  Not responding to painful stimuli or voice.         Procedures           ED Course                                                 MDM  PULSE OXIMETRY INTERPRETATION  Patient had a pulse ox of 45% on nonrebreather mask.. This is a abnormal/hypoxic pulse oximetry reading.        46-year-old male presents to the ED status post suspected opiate overdose.  He was unresponsive cyanotic diaphoretic and ill-appearing on initial evaluation.  He was pulled from a car placed on the stretcher.  Bag-valve-mask ventilation and jaw thrust maneuvers were initiated immediately.  Patient's initial pulse ox was 45% with bag-valve-mask ventilation.  I placed a nasal trumpet.  Patient's pulse ox improved to 100% with bag-valve-mask ventilation.  He was given 2 mg Narcan IM and then subsequent 2 mg of Narcan IV with improvement in his respiratory status.  Patient monitored here in the ED for greater than 2 hours with no return of respiratory depression or altered mental status.  He is currently awake alert and oriented x4 at 1:54 PM requesting to be discharged.  Wife is at bedside and agreeable to taking the patient home and monitoring him.      Final diagnoses:   Opiate overdose, accidental or unintentional, initial encounter ([facility])       ED Disposition  ED Disposition     ED Disposition   Discharge    Condition   Stable    Comment   --             Shivani Canales, APRN  [address] [facility]  [address] [facility], [address] [address]  [phone]               Medication List      No changes were made to your prescriptions during this visit.          Smooth Powers, DO  06/12/22 8266

## 2022-08-17 ENCOUNTER — HOSPITAL ENCOUNTER (EMERGENCY)
Facility: HOSPITAL | Age: 47
Discharge: HOME | End: 2022-08-17
Attending: EMERGENCY MEDICINE | Admitting: EMERGENCY MEDICINE

## 2022-08-17 ENCOUNTER — APPOINTMENT (OUTPATIENT)
Dept: CT IMAGING | Facility: HOSPITAL | Age: 47
End: 2022-08-17

## 2022-08-17 VITALS
RESPIRATION RATE: 18 BRPM | OXYGEN SATURATION: 94 % | SYSTOLIC BLOOD PRESSURE: 129 MMHG | TEMPERATURE: 98.3 F | DIASTOLIC BLOOD PRESSURE: 85 MMHG | HEART RATE: 63 BPM | HEIGHT: 68 IN | BODY MASS INDEX: 25.91 KG/M2 | WEIGHT: 171 LBS

## 2022-08-17 DIAGNOSIS — N23 RENAL COLIC ON RIGHT SIDE: ICD-10-CM

## 2022-08-17 DIAGNOSIS — N20.1 URETEROLITHIASIS: Primary | ICD-10-CM

## 2022-08-17 LAB
ALBUMIN SERPL-MCNC: 4.1 G/DL (ref 3.5–5.2)
ALBUMIN/GLOB SERPL: 1.2 G/DL
ALP SERPL-CCNC: 127 U/L (ref 39–117)
ALT SERPL W P-5'-P-CCNC: 74 U/L (ref 1–41)
ANION GAP SERPL CALCULATED.3IONS-SCNC: 9.9 MMOL/L (ref 5–15)
AST SERPL-CCNC: 44 U/L (ref 1–40)
BACTERIA UR QL AUTO: ABNORMAL /HPF
BASOPHILS # BLD AUTO: 0.05 10*3/MM3 (ref 0–0.2)
BASOPHILS NFR BLD AUTO: 0.4 % (ref 0–1.5)
BILIRUB SERPL-MCNC: 0.6 MG/DL (ref 0–1.2)
BILIRUB UR QL STRIP: NEGATIVE
BUN SERPL-MCNC: 21 MG/DL (ref 6–20)
BUN/CREAT SERPL: 18.9 (ref 7–25)
CALCIUM SPEC-SCNC: 9.3 MG/DL (ref 8.6–10.5)
CHLORIDE SERPL-SCNC: 103 MMOL/L (ref 98–107)
CLARITY UR: CLEAR
CO2 SERPL-SCNC: 27.1 MMOL/L (ref 22–29)
COLOR UR: ABNORMAL
CREAT SERPL-MCNC: 1.11 MG/DL (ref 0.76–1.27)
DEPRECATED RDW RBC AUTO: 38.4 FL (ref 37–54)
EGFRCR SERPLBLD CKD-EPI 2021: 82.4 ML/MIN/1.73
EOSINOPHIL # BLD AUTO: 0.3 10*3/MM3 (ref 0–0.4)
EOSINOPHIL NFR BLD AUTO: 2.6 % (ref 0.3–6.2)
ERYTHROCYTE [DISTWIDTH] IN BLOOD BY AUTOMATED COUNT: 12.9 % (ref 12.3–15.4)
GLOBULIN UR ELPH-MCNC: 3.4 GM/DL
GLUCOSE SERPL-MCNC: 128 MG/DL (ref 65–99)
GLUCOSE UR STRIP-MCNC: NEGATIVE MG/DL
HCT VFR BLD AUTO: 42.9 % (ref 37.5–51)
HGB BLD-MCNC: 13.6 G/DL (ref 13–17.7)
HGB UR QL STRIP.AUTO: ABNORMAL
HOLD SPECIMEN: NORMAL
HOLD SPECIMEN: NORMAL
HYALINE CASTS UR QL AUTO: ABNORMAL /LPF
IMM GRANULOCYTES # BLD AUTO: 0.04 10*3/MM3 (ref 0–0.05)
IMM GRANULOCYTES NFR BLD AUTO: 0.3 % (ref 0–0.5)
KETONES UR QL STRIP: ABNORMAL
LEUKOCYTE ESTERASE UR QL STRIP.AUTO: ABNORMAL
LIPASE SERPL-CCNC: 25 U/L (ref 13–60)
LYMPHOCYTES # BLD AUTO: 2.79 10*3/MM3 (ref 0.7–3.1)
LYMPHOCYTES NFR BLD AUTO: 24.3 % (ref 19.6–45.3)
MCH RBC QN AUTO: 25.9 PG (ref 26.6–33)
MCHC RBC AUTO-ENTMCNC: 31.7 G/DL (ref 31.5–35.7)
MCV RBC AUTO: 81.6 FL (ref 79–97)
MONOCYTES # BLD AUTO: 1.08 10*3/MM3 (ref 0.1–0.9)
MONOCYTES NFR BLD AUTO: 9.4 % (ref 5–12)
MUCOUS THREADS URNS QL MICRO: ABNORMAL /HPF
NEUTROPHILS NFR BLD AUTO: 63 % (ref 42.7–76)
NEUTROPHILS NFR BLD AUTO: 7.24 10*3/MM3 (ref 1.7–7)
NITRITE UR QL STRIP: NEGATIVE
NRBC BLD AUTO-RTO: 0 /100 WBC (ref 0–0.2)
PH UR STRIP.AUTO: 5.5 [PH] (ref 5–8)
PLATELET # BLD AUTO: 279 10*3/MM3 (ref 140–450)
PMV BLD AUTO: 9.4 FL (ref 6–12)
POTASSIUM SERPL-SCNC: 3.8 MMOL/L (ref 3.5–5.2)
PROT SERPL-MCNC: 7.5 G/DL (ref 6–8.5)
PROT UR QL STRIP: ABNORMAL
RBC # BLD AUTO: 5.26 10*6/MM3 (ref 4.14–5.8)
RBC # UR STRIP: ABNORMAL /HPF
REF LAB TEST METHOD: ABNORMAL
SODIUM SERPL-SCNC: 140 MMOL/L (ref 136–145)
SP GR UR STRIP: 1.03 (ref 1–1.03)
SQUAMOUS #/AREA URNS HPF: ABNORMAL /HPF
UROBILINOGEN UR QL STRIP: ABNORMAL
WBC # UR STRIP: ABNORMAL /HPF
WBC NRBC COR # BLD: 11.5 10*3/MM3 (ref 3.4–10.8)
WHOLE BLOOD HOLD COAG: NORMAL
WHOLE BLOOD HOLD SPECIMEN: NORMAL

## 2022-08-17 PROCEDURE — 99283 EMERGENCY DEPT VISIT LOW MDM: CPT

## 2022-08-17 PROCEDURE — 96374 THER/PROPH/DIAG INJ IV PUSH: CPT

## 2022-08-17 PROCEDURE — 96375 TX/PRO/DX INJ NEW DRUG ADDON: CPT

## 2022-08-17 PROCEDURE — 25010000002 KETOROLAC TROMETHAMINE PER 15 MG: Performed by: PHYSICIAN ASSISTANT

## 2022-08-17 PROCEDURE — 85025 COMPLETE CBC W/AUTO DIFF WBC: CPT | Performed by: EMERGENCY MEDICINE

## 2022-08-17 PROCEDURE — 25010000002 ONDANSETRON PER 1 MG: Performed by: EMERGENCY MEDICINE

## 2022-08-17 PROCEDURE — 74176 CT ABD & PELVIS W/O CONTRAST: CPT

## 2022-08-17 PROCEDURE — 25010000002 MORPHINE PER 10 MG: Performed by: EMERGENCY MEDICINE

## 2022-08-17 PROCEDURE — 81001 URINALYSIS AUTO W/SCOPE: CPT | Performed by: EMERGENCY MEDICINE

## 2022-08-17 PROCEDURE — 80053 COMPREHEN METABOLIC PANEL: CPT | Performed by: EMERGENCY MEDICINE

## 2022-08-17 PROCEDURE — 83690 ASSAY OF LIPASE: CPT | Performed by: EMERGENCY MEDICINE

## 2022-08-17 RX ORDER — KETOROLAC TROMETHAMINE 30 MG/ML
15 INJECTION, SOLUTION INTRAMUSCULAR; INTRAVENOUS ONCE
Status: COMPLETED | OUTPATIENT
Start: 2022-08-17 | End: 2022-08-17

## 2022-08-17 RX ORDER — SODIUM CHLORIDE 0.9 % (FLUSH) 0.9 %
10 SYRINGE (ML) INJECTION AS NEEDED
Status: DISCONTINUED | OUTPATIENT
Start: 2022-08-17 | End: 2022-08-18 | Stop reason: HOSPADM

## 2022-08-17 RX ORDER — TAMSULOSIN HYDROCHLORIDE 0.4 MG/1
1 CAPSULE ORAL DAILY
Qty: 30 CAPSULE | Refills: 0 | Status: SHIPPED | OUTPATIENT
Start: 2022-08-17

## 2022-08-17 RX ORDER — KETOROLAC TROMETHAMINE 10 MG/1
10 TABLET, FILM COATED ORAL EVERY 6 HOURS PRN
Qty: 20 TABLET | Refills: 0 | Status: SHIPPED | OUTPATIENT
Start: 2022-08-17

## 2022-08-17 RX ORDER — MORPHINE SULFATE 4 MG/ML
4 INJECTION, SOLUTION INTRAMUSCULAR; INTRAVENOUS ONCE
Status: DISCONTINUED | OUTPATIENT
Start: 2022-08-17 | End: 2022-08-18 | Stop reason: HOSPADM

## 2022-08-17 RX ORDER — ONDANSETRON 2 MG/ML
4 INJECTION INTRAMUSCULAR; INTRAVENOUS ONCE
Status: COMPLETED | OUTPATIENT
Start: 2022-08-17 | End: 2022-08-17

## 2022-08-17 RX ADMIN — SODIUM CHLORIDE 1000 ML: 9 INJECTION, SOLUTION INTRAVENOUS at 19:36

## 2022-08-17 RX ADMIN — KETOROLAC TROMETHAMINE 15 MG: 30 INJECTION, SOLUTION INTRAMUSCULAR; INTRAVENOUS at 19:43

## 2022-08-17 RX ADMIN — ONDANSETRON 4 MG: 2 INJECTION INTRAMUSCULAR; INTRAVENOUS at 19:35

## 2022-08-17 NOTE — ED PROVIDER NOTES
Subjective   This is a 46-year-old male who presents to the emergency department chief complaint right flank pain.  Patient describes pain as sharp, stabbing pain.  Patient does state he has history of kidney stones and states this feels similar to previous episodes.  Patient denies any fever, chills, nausea, vomiting. Patient does state that he has had some mild dysuria.       History provided by:  Patient   used: No    Flank Pain  Pain location:  R flank  Pain quality: sharp and stabbing    Pain radiates to:  Does not radiate  Pain severity:  Moderate  Onset quality:  Gradual  Duration:  2 days  Timing:  Intermittent  Progression:  Worsening  Chronicity:  New  Context: not alcohol use, not awakening from sleep, not diet changes, not eating, not laxative use, not previous surgeries, not recent sexual activity, not recent travel, not retching, not sick contacts, not suspicious food intake and not trauma    Associated symptoms: dysuria and hematuria    Associated symptoms: no chest pain, no chills, no constipation, no cough, no diarrhea, no fatigue and no shortness of breath        Review of Systems   Constitutional: Negative.  Negative for activity change, appetite change, chills, diaphoresis and fatigue.   Eyes: Negative.  Negative for pain, redness and itching.   Respiratory: Negative.  Negative for cough, choking, chest tightness and shortness of breath.    Cardiovascular: Negative.  Negative for chest pain and leg swelling.   Gastrointestinal: Negative.  Negative for blood in stool, constipation and diarrhea.   Genitourinary: Positive for difficulty urinating, dysuria, flank pain, frequency and hematuria. Negative for penile pain, penile swelling and scrotal swelling.   Musculoskeletal: Positive for arthralgias and myalgias. Negative for back pain, gait problem and joint swelling.   Skin: Negative.  Negative for color change, pallor, rash and wound.   Neurological: Negative.  Negative for  seizures, facial asymmetry, light-headedness and headaches.   Hematological: Negative.    Psychiatric/Behavioral: Negative.  Negative for behavioral problems, confusion, decreased concentration, dysphoric mood and hallucinations. The patient is not nervous/anxious and is not hyperactive.    All other systems reviewed and are negative.      Past Medical History:   Diagnosis Date   • Impaired functional mobility, balance, gait, and endurance    • Lung disease        Allergies   Allergen Reactions   • Ultram [Tramadol Hcl] Hives       Past Surgical History:   Procedure Laterality Date   • BRONCHOSCOPY N/A 11/6/2019    Procedure: BRONCHOSCOPY at bedside with washings;  Surgeon: Bria Tamayo MD;  Location: [facility];  Service: Pulmonary   • LEG SURGERY      broken bone   • NECK SURGERY         No family history on file.    Social History     Socioeconomic History   • Marital status: Single   Tobacco Use   • Smoking status: Current Every Day Smoker     Packs/day: 0.50     Types: Cigarettes   • Smokeless tobacco: Current User   Substance and Sexual Activity   • Alcohol use: No   • Drug use: Yes     Types: Methamphetamines     Comment: heroin   • Sexual activity: Defer           Objective   Physical Exam  Vitals reviewed.   Constitutional:       General: He is not in acute distress.     Appearance: He is well-developed and normal weight. He is not ill-appearing, toxic-appearing or diaphoretic.   HENT:      Head: Normocephalic and atraumatic.      Mouth/Throat:      Mouth: Mucous membranes are moist.      Pharynx: Oropharynx is clear. No pharyngeal swelling.   Eyes:      General: No scleral icterus.     Extraocular Movements: Extraocular movements intact.      Pupils: Pupils are equal, round, and reactive to light.   Cardiovascular:      Rate and Rhythm: Normal rate and regular rhythm.      Heart sounds: Normal heart sounds. No murmur heard.    No friction rub. No gallop.   Pulmonary:      Effort: Pulmonary effort is  normal. No respiratory distress.      Breath sounds: Normal breath sounds. No stridor. No wheezing, rhonchi or rales.   Chest:      Chest wall: No tenderness.   Abdominal:      General: Abdomen is flat. Bowel sounds are normal. There is no distension or abdominal bruit. There are no signs of injury.      Palpations: Abdomen is soft. There is no shifting dullness, fluid wave, hepatomegaly, splenomegaly, mass or pulsatile mass.      Tenderness: There is no abdominal tenderness. There is no right CVA tenderness, left CVA tenderness, guarding or rebound. Negative signs include Walter's sign, Rovsing's sign and McBurney's sign.      Hernia: No hernia is present. There is no hernia in the umbilical area, ventral area, left inguinal area, right femoral area or right inguinal area.   Genitourinary:     Testes:         Right: Mass, tenderness or swelling not present.         Left: Mass, tenderness or swelling not present.   Skin:     General: Skin is warm and dry.      Capillary Refill: Capillary refill takes less than 2 seconds.      Coloration: Skin is not cyanotic, jaundiced, mottled or pale.      Findings: No erythema.   Neurological:      General: No focal deficit present.      Mental Status: He is alert and oriented to person, place, and time.      Cranial Nerves: No cranial nerve deficit.      Motor: No weakness.   Psychiatric:         Mood and Affect: Mood normal. Mood is not anxious or depressed.         Behavior: Behavior normal.         Procedures           ED Course  ED Course as of 08/17/22 2155   Wed Aug 17, 2022   2151 CT scan shows a obstructive 1 mm calculus at the right UVJ. [BH]      ED Course User Index  [BH] Sanju Menendez PA-C                                           [facility]    Final diagnoses:   Ureterolithiasis   Renal colic on right side       ED Disposition  ED Disposition     ED Disposition   Discharge    Condition   Stable    Comment   --             Alan Mtz MD  [address] [place] [address]  [address] [address]  [phone]    Call in 1 day           Medication List      New Prescriptions    ketorolac 10 MG tablet  Commonly known as: TORADOL  Take 1 tablet by mouth Every 6 (Six) Hours As Needed for Moderate Pain .     tamsulosin 0.4 MG capsule 24 hr capsule  Commonly known as: FLOMAX  Take 1 capsule by mouth Daily.           Where to Get Your Medications      These medications were sent to [facility] [address], [facility] [address]    Hours: 9AM-6PM Mon-Fri Phone: [phone]   · ketorolac 10 MG tablet  · tamsulosin 0.4 MG capsule 24 hr capsule          Sanju Menendez PA-C  08/17/22 1504

## (undated) DEVICE — GLV SURG SENSICARE W/ALOE PF LF 7.5 STRL

## (undated) DEVICE — 1860S HEALTH CARE RESPIRATOR N95 120EA/C: Brand: 3M™

## (undated) DEVICE — CUFF SCD HEMOFORCE SEQ CALF STD MD